# Patient Record
Sex: FEMALE | Race: WHITE | Employment: OTHER | URBAN - METROPOLITAN AREA
[De-identification: names, ages, dates, MRNs, and addresses within clinical notes are randomized per-mention and may not be internally consistent; named-entity substitution may affect disease eponyms.]

---

## 2017-01-13 ENCOUNTER — HOSPITAL ENCOUNTER (OUTPATIENT)
Dept: CT IMAGING | Age: 62
Discharge: HOME | End: 2017-01-13
Payer: MEDICAID

## 2017-01-13 VITALS
HEART RATE: 67 BPM | WEIGHT: 138 LBS | RESPIRATION RATE: 16 BRPM | DIASTOLIC BLOOD PRESSURE: 76 MMHG | HEIGHT: 64 IN | BODY MASS INDEX: 23.56 KG/M2 | SYSTOLIC BLOOD PRESSURE: 113 MMHG

## 2017-01-13 DIAGNOSIS — C50.919 MALIGNANT NEOPLASM OF FEMALE BREAST, UNSPECIFIED LATERALITY, UNSPECIFIED SITE OF BREAST: ICD-10-CM

## 2017-01-13 LAB
GFR AFRICAN AMERICAN: >60
GFR NON-AFRICAN AMERICAN: >60
PERFORMED ON: NORMAL
POC CREATININE: 0.7 MG/DL (ref 0.6–1.2)
POC SAMPLE TYPE: NORMAL

## 2017-01-13 PROCEDURE — 2500000003 HC RX 250 WO HCPCS: Performed by: RADIOLOGY

## 2017-01-13 PROCEDURE — 74177 CT ABD & PELVIS W/CONTRAST: CPT

## 2017-01-13 PROCEDURE — 71260 CT THORAX DX C+: CPT

## 2017-01-13 PROCEDURE — 6360000004 HC RX CONTRAST MEDICATION: Performed by: RADIOLOGY

## 2017-01-13 PROCEDURE — 2580000003 HC RX 258: Performed by: RADIOLOGY

## 2017-01-13 RX ORDER — SODIUM CHLORIDE 0.9 % (FLUSH) 0.9 %
10 SYRINGE (ML) INJECTION
Status: COMPLETED | OUTPATIENT
Start: 2017-01-13 | End: 2017-01-13

## 2017-01-13 RX ADMIN — SODIUM CHLORIDE, PRESERVATIVE FREE 10 ML: 5 INJECTION INTRAVENOUS at 10:44

## 2017-01-13 RX ADMIN — BARIUM SULFATE 450 ML: 21 SUSPENSION ORAL at 10:43

## 2017-01-13 RX ADMIN — IOPAMIDOL 100 ML: 612 INJECTION, SOLUTION INTRAVENOUS at 10:43

## 2017-01-31 RX ORDER — LEVOTHYROXINE SODIUM 0.05 MG/1
TABLET ORAL
Qty: 30 TABLET | Refills: 3 | Status: SHIPPED | OUTPATIENT
Start: 2017-01-31 | End: 2017-08-05 | Stop reason: SDUPTHER

## 2017-02-08 ENCOUNTER — OFFICE VISIT (OUTPATIENT)
Dept: SURGERY | Age: 62
End: 2017-02-08

## 2017-02-08 VITALS
TEMPERATURE: 98.2 F | SYSTOLIC BLOOD PRESSURE: 136 MMHG | BODY MASS INDEX: 24.07 KG/M2 | DIASTOLIC BLOOD PRESSURE: 80 MMHG | WEIGHT: 141 LBS | HEIGHT: 64 IN

## 2017-02-08 DIAGNOSIS — Z95.828 PORT-A-CATH IN PLACE: Primary | ICD-10-CM

## 2017-02-08 PROCEDURE — 99213 OFFICE O/P EST LOW 20 MIN: CPT | Performed by: SURGERY

## 2017-02-17 ENCOUNTER — HOSPITAL ENCOUNTER (OUTPATIENT)
Dept: PREADMISSION TESTING | Age: 62
Discharge: HOME | End: 2017-02-17
Payer: MEDICAID

## 2017-02-17 VITALS
DIASTOLIC BLOOD PRESSURE: 76 MMHG | OXYGEN SATURATION: 97 % | BODY MASS INDEX: 25.34 KG/M2 | SYSTOLIC BLOOD PRESSURE: 155 MMHG | HEIGHT: 63 IN | WEIGHT: 143 LBS | HEART RATE: 64 BPM | TEMPERATURE: 97.8 F

## 2017-02-17 LAB
ANION GAP SERPL CALCULATED.3IONS-SCNC: 14 MEQ/L (ref 7–13)
BUN BLDV-MCNC: 16 MG/DL (ref 8–23)
CALCIUM SERPL-MCNC: 9.3 MG/DL (ref 8.6–10.2)
CHLORIDE BLD-SCNC: 101 MEQ/L (ref 98–107)
CO2: 24 MEQ/L (ref 22–29)
CREAT SERPL-MCNC: 0.6 MG/DL (ref 0.5–0.9)
EKG ATRIAL RATE: 54 BPM
EKG P AXIS: 48 DEGREES
EKG P-R INTERVAL: 150 MS
EKG Q-T INTERVAL: 454 MS
EKG QRS DURATION: 84 MS
EKG QTC CALCULATION (BAZETT): 430 MS
EKG R AXIS: 29 DEGREES
EKG T AXIS: 31 DEGREES
EKG VENTRICULAR RATE: 54 BPM
GFR AFRICAN AMERICAN: >60
GFR NON-AFRICAN AMERICAN: >60
GLUCOSE BLD-MCNC: 90 MG/DL (ref 74–109)
HCT VFR BLD CALC: 39.3 % (ref 37–47)
HEMOGLOBIN: 13.2 G/DL (ref 12–16)
MCH RBC QN AUTO: 30.3 PG (ref 27–31.3)
MCHC RBC AUTO-ENTMCNC: 33.5 % (ref 33–37)
MCV RBC AUTO: 90.5 FL (ref 82–100)
PDW BLD-RTO: 13 % (ref 11.5–14.5)
PLATELET # BLD: 181 K/UL (ref 130–400)
POTASSIUM SERPL-SCNC: 3.8 MEQ/L (ref 3.5–5.1)
RBC # BLD: 4.34 M/UL (ref 4.2–5.4)
SODIUM BLD-SCNC: 139 MEQ/L (ref 132–144)
WBC # BLD: 3.9 K/UL (ref 4.8–10.8)

## 2017-02-17 PROCEDURE — 80048 BASIC METABOLIC PNL TOTAL CA: CPT

## 2017-02-17 PROCEDURE — 85027 COMPLETE CBC AUTOMATED: CPT

## 2017-02-17 PROCEDURE — 93005 ELECTROCARDIOGRAM TRACING: CPT

## 2017-02-17 RX ORDER — SODIUM CHLORIDE, SODIUM LACTATE, POTASSIUM CHLORIDE, CALCIUM CHLORIDE 600; 310; 30; 20 MG/100ML; MG/100ML; MG/100ML; MG/100ML
INJECTION, SOLUTION INTRAVENOUS CONTINUOUS
Status: CANCELLED | OUTPATIENT
Start: 2017-02-17

## 2017-02-17 RX ORDER — SODIUM CHLORIDE 0.9 % (FLUSH) 0.9 %
10 SYRINGE (ML) INJECTION PRN
Status: CANCELLED | OUTPATIENT
Start: 2017-02-17

## 2017-02-17 RX ORDER — SODIUM CHLORIDE 9 MG/ML
INJECTION, SOLUTION INTRAVENOUS CONTINUOUS
Status: CANCELLED | OUTPATIENT
Start: 2017-02-21

## 2017-02-17 RX ORDER — LIDOCAINE HYDROCHLORIDE 10 MG/ML
1 INJECTION, SOLUTION EPIDURAL; INFILTRATION; INTRACAUDAL; PERINEURAL
Status: CANCELLED | OUTPATIENT
Start: 2017-02-17 | End: 2017-02-17

## 2017-02-17 RX ORDER — SODIUM CHLORIDE 0.9 % (FLUSH) 0.9 %
10 SYRINGE (ML) INJECTION EVERY 12 HOURS SCHEDULED
Status: CANCELLED | OUTPATIENT
Start: 2017-02-17

## 2017-02-21 ENCOUNTER — HOSPITAL ENCOUNTER (OUTPATIENT)
Age: 62
Setting detail: OUTPATIENT SURGERY
Discharge: HOME | End: 2017-02-21
Attending: SURGERY | Admitting: SURGERY
Payer: MEDICAID

## 2017-02-21 ENCOUNTER — ANESTHESIA (OUTPATIENT)
Dept: OPERATING ROOM | Age: 62
End: 2017-02-21
Payer: MEDICAID

## 2017-02-21 ENCOUNTER — ANESTHESIA EVENT (OUTPATIENT)
Dept: OPERATING ROOM | Age: 62
End: 2017-02-21
Payer: MEDICAID

## 2017-02-21 ENCOUNTER — SURGERY (OUTPATIENT)
Age: 62
End: 2017-02-21

## 2017-02-21 VITALS — DIASTOLIC BLOOD PRESSURE: 56 MMHG | TEMPERATURE: 95.9 F | SYSTOLIC BLOOD PRESSURE: 113 MMHG | OXYGEN SATURATION: 95 %

## 2017-02-21 VITALS
SYSTOLIC BLOOD PRESSURE: 153 MMHG | BODY MASS INDEX: 26.31 KG/M2 | HEART RATE: 71 BPM | WEIGHT: 143 LBS | RESPIRATION RATE: 16 BRPM | TEMPERATURE: 97.7 F | DIASTOLIC BLOOD PRESSURE: 69 MMHG | OXYGEN SATURATION: 99 % | HEIGHT: 62 IN

## 2017-02-21 PROBLEM — Z95.828 PORT-A-CATH IN PLACE: Status: ACTIVE | Noted: 2017-02-21

## 2017-02-21 PROCEDURE — 7100000010 HC PHASE II RECOVERY - FIRST 15 MIN: Performed by: SURGERY

## 2017-02-21 PROCEDURE — 2500000003 HC RX 250 WO HCPCS: Performed by: STUDENT IN AN ORGANIZED HEALTH CARE EDUCATION/TRAINING PROGRAM

## 2017-02-21 PROCEDURE — 3600000012 HC SURGERY LEVEL 2 ADDTL 15MIN: Performed by: SURGERY

## 2017-02-21 PROCEDURE — 3600000002 HC SURGERY LEVEL 2 BASE: Performed by: SURGERY

## 2017-02-21 PROCEDURE — 2500000003 HC RX 250 WO HCPCS: Performed by: NURSE ANESTHETIST, CERTIFIED REGISTERED

## 2017-02-21 PROCEDURE — 6360000002 HC RX W HCPCS: Performed by: NURSE ANESTHETIST, CERTIFIED REGISTERED

## 2017-02-21 PROCEDURE — 2580000003 HC RX 258: Performed by: NURSE ANESTHETIST, CERTIFIED REGISTERED

## 2017-02-21 PROCEDURE — 7100000011 HC PHASE II RECOVERY - ADDTL 15 MIN: Performed by: SURGERY

## 2017-02-21 PROCEDURE — 6370000000 HC RX 637 (ALT 250 FOR IP): Performed by: SURGERY

## 2017-02-21 PROCEDURE — 3700000000 HC ANESTHESIA ATTENDED CARE: Performed by: SURGERY

## 2017-02-21 PROCEDURE — 7100000001 HC PACU RECOVERY - ADDTL 15 MIN: Performed by: SURGERY

## 2017-02-21 PROCEDURE — 2580000003 HC RX 258: Performed by: SURGERY

## 2017-02-21 PROCEDURE — 2500000003 HC RX 250 WO HCPCS: Performed by: SURGERY

## 2017-02-21 PROCEDURE — 7100000000 HC PACU RECOVERY - FIRST 15 MIN: Performed by: SURGERY

## 2017-02-21 PROCEDURE — 3700000001 HC ADD 15 MINUTES (ANESTHESIA): Performed by: SURGERY

## 2017-02-21 RX ORDER — ACETAMINOPHEN 325 MG/1
650 TABLET ORAL EVERY 4 HOURS PRN
Status: DISCONTINUED | OUTPATIENT
Start: 2017-02-21 | End: 2017-02-21 | Stop reason: HOSPADM

## 2017-02-21 RX ORDER — LIDOCAINE HYDROCHLORIDE 20 MG/ML
INJECTION, SOLUTION INFILTRATION; PERINEURAL PRN
Status: DISCONTINUED | OUTPATIENT
Start: 2017-02-21 | End: 2017-02-21 | Stop reason: SDUPTHER

## 2017-02-21 RX ORDER — ONDANSETRON 2 MG/ML
4 INJECTION INTRAMUSCULAR; INTRAVENOUS
Status: DISCONTINUED | OUTPATIENT
Start: 2017-02-21 | End: 2017-02-21 | Stop reason: HOSPADM

## 2017-02-21 RX ORDER — SODIUM CHLORIDE 9 MG/ML
INJECTION, SOLUTION INTRAVENOUS CONTINUOUS PRN
Status: DISCONTINUED | OUTPATIENT
Start: 2017-02-21 | End: 2017-02-21 | Stop reason: SDUPTHER

## 2017-02-21 RX ORDER — DIPHENHYDRAMINE HYDROCHLORIDE 50 MG/ML
12.5 INJECTION INTRAMUSCULAR; INTRAVENOUS
Status: DISCONTINUED | OUTPATIENT
Start: 2017-02-21 | End: 2017-02-21 | Stop reason: HOSPADM

## 2017-02-21 RX ORDER — MORPHINE SULFATE 4 MG/ML
4 INJECTION, SOLUTION INTRAMUSCULAR; INTRAVENOUS
Status: DISCONTINUED | OUTPATIENT
Start: 2017-02-21 | End: 2017-02-21 | Stop reason: HOSPADM

## 2017-02-21 RX ORDER — SODIUM CHLORIDE 0.9 % (FLUSH) 0.9 %
10 SYRINGE (ML) INJECTION EVERY 12 HOURS SCHEDULED
Status: DISCONTINUED | OUTPATIENT
Start: 2017-02-21 | End: 2017-02-21 | Stop reason: HOSPADM

## 2017-02-21 RX ORDER — ONDANSETRON 2 MG/ML
INJECTION INTRAMUSCULAR; INTRAVENOUS PRN
Status: DISCONTINUED | OUTPATIENT
Start: 2017-02-21 | End: 2017-02-21 | Stop reason: SDUPTHER

## 2017-02-21 RX ORDER — LIDOCAINE HYDROCHLORIDE 10 MG/ML
1 INJECTION, SOLUTION EPIDURAL; INFILTRATION; INTRACAUDAL; PERINEURAL
Status: COMPLETED | OUTPATIENT
Start: 2017-02-21 | End: 2017-02-21

## 2017-02-21 RX ORDER — SODIUM CHLORIDE 9 MG/ML
INJECTION, SOLUTION INTRAVENOUS CONTINUOUS
Status: DISCONTINUED | OUTPATIENT
Start: 2017-02-21 | End: 2017-02-21 | Stop reason: HOSPADM

## 2017-02-21 RX ORDER — MAGNESIUM HYDROXIDE 1200 MG/15ML
LIQUID ORAL CONTINUOUS PRN
Status: DISCONTINUED | OUTPATIENT
Start: 2017-02-21 | End: 2017-02-21 | Stop reason: HOSPADM

## 2017-02-21 RX ORDER — SODIUM CHLORIDE 0.9 % (FLUSH) 0.9 %
10 SYRINGE (ML) INJECTION PRN
Status: DISCONTINUED | OUTPATIENT
Start: 2017-02-21 | End: 2017-02-21 | Stop reason: HOSPADM

## 2017-02-21 RX ORDER — FENTANYL CITRATE 50 UG/ML
50 INJECTION, SOLUTION INTRAMUSCULAR; INTRAVENOUS EVERY 10 MIN PRN
Status: DISCONTINUED | OUTPATIENT
Start: 2017-02-21 | End: 2017-02-21 | Stop reason: HOSPADM

## 2017-02-21 RX ORDER — FENTANYL CITRATE 50 UG/ML
INJECTION, SOLUTION INTRAMUSCULAR; INTRAVENOUS PRN
Status: DISCONTINUED | OUTPATIENT
Start: 2017-02-21 | End: 2017-02-21 | Stop reason: SDUPTHER

## 2017-02-21 RX ORDER — MEPERIDINE HYDROCHLORIDE 25 MG/ML
12.5 INJECTION INTRAMUSCULAR; INTRAVENOUS; SUBCUTANEOUS EVERY 5 MIN PRN
Status: DISCONTINUED | OUTPATIENT
Start: 2017-02-21 | End: 2017-02-21 | Stop reason: HOSPADM

## 2017-02-21 RX ORDER — ACETAMINOPHEN 650 MG/1
650 SUPPOSITORY RECTAL EVERY 4 HOURS PRN
Status: DISCONTINUED | OUTPATIENT
Start: 2017-02-21 | End: 2017-02-21 | Stop reason: HOSPADM

## 2017-02-21 RX ORDER — HYDROCODONE BITARTRATE AND ACETAMINOPHEN 5; 325 MG/1; MG/1
2 TABLET ORAL EVERY 4 HOURS PRN
Status: DISCONTINUED | OUTPATIENT
Start: 2017-02-21 | End: 2017-02-21 | Stop reason: HOSPADM

## 2017-02-21 RX ORDER — MORPHINE SULFATE 2 MG/ML
2 INJECTION, SOLUTION INTRAMUSCULAR; INTRAVENOUS
Status: DISCONTINUED | OUTPATIENT
Start: 2017-02-21 | End: 2017-02-21 | Stop reason: HOSPADM

## 2017-02-21 RX ORDER — MIDAZOLAM HYDROCHLORIDE 1 MG/ML
INJECTION INTRAMUSCULAR; INTRAVENOUS PRN
Status: DISCONTINUED | OUTPATIENT
Start: 2017-02-21 | End: 2017-02-21 | Stop reason: SDUPTHER

## 2017-02-21 RX ORDER — SODIUM CHLORIDE, SODIUM LACTATE, POTASSIUM CHLORIDE, CALCIUM CHLORIDE 600; 310; 30; 20 MG/100ML; MG/100ML; MG/100ML; MG/100ML
INJECTION, SOLUTION INTRAVENOUS CONTINUOUS
Status: DISCONTINUED | OUTPATIENT
Start: 2017-02-21 | End: 2017-02-21

## 2017-02-21 RX ORDER — LIDOCAINE HYDROCHLORIDE AND EPINEPHRINE 10; 10 MG/ML; UG/ML
INJECTION, SOLUTION INFILTRATION; PERINEURAL PRN
Status: DISCONTINUED | OUTPATIENT
Start: 2017-02-21 | End: 2017-02-21 | Stop reason: HOSPADM

## 2017-02-21 RX ORDER — HYDROCODONE BITARTRATE AND ACETAMINOPHEN 5; 325 MG/1; MG/1
1 TABLET ORAL EVERY 4 HOURS PRN
Status: DISCONTINUED | OUTPATIENT
Start: 2017-02-21 | End: 2017-02-21 | Stop reason: HOSPADM

## 2017-02-21 RX ORDER — METOCLOPRAMIDE HYDROCHLORIDE 5 MG/ML
10 INJECTION INTRAMUSCULAR; INTRAVENOUS
Status: DISCONTINUED | OUTPATIENT
Start: 2017-02-21 | End: 2017-02-21 | Stop reason: HOSPADM

## 2017-02-21 RX ORDER — WOUND DRESSING ADHESIVE - LIQUID
LIQUID MISCELLANEOUS PRN
Status: DISCONTINUED | OUTPATIENT
Start: 2017-02-21 | End: 2017-02-21 | Stop reason: HOSPADM

## 2017-02-21 RX ORDER — ONDANSETRON 2 MG/ML
4 INJECTION INTRAMUSCULAR; INTRAVENOUS EVERY 6 HOURS PRN
Status: DISCONTINUED | OUTPATIENT
Start: 2017-02-21 | End: 2017-02-21 | Stop reason: HOSPADM

## 2017-02-21 RX ORDER — PROPOFOL 10 MG/ML
INJECTION, EMULSION INTRAVENOUS PRN
Status: DISCONTINUED | OUTPATIENT
Start: 2017-02-21 | End: 2017-02-21 | Stop reason: SDUPTHER

## 2017-02-21 RX ADMIN — ONDANSETRON 4 MG: 2 INJECTION, SOLUTION INTRAMUSCULAR; INTRAVENOUS at 12:16

## 2017-02-21 RX ADMIN — LIDOCAINE HYDROCHLORIDE 0.1 ML: 10 INJECTION, SOLUTION EPIDURAL; INFILTRATION; INTRACAUDAL; PERINEURAL at 11:22

## 2017-02-21 RX ADMIN — FENTANYL CITRATE 25 MCG: 50 INJECTION, SOLUTION INTRAMUSCULAR; INTRAVENOUS at 12:14

## 2017-02-21 RX ADMIN — PROPOFOL 30 MG: 10 INJECTION, EMULSION INTRAVENOUS at 12:09

## 2017-02-21 RX ADMIN — FENTANYL CITRATE 50 MCG: 50 INJECTION, SOLUTION INTRAMUSCULAR; INTRAVENOUS at 12:00

## 2017-02-21 RX ADMIN — FENTANYL CITRATE 25 MCG: 50 INJECTION, SOLUTION INTRAMUSCULAR; INTRAVENOUS at 12:10

## 2017-02-21 RX ADMIN — LIDOCAINE HYDROCHLORIDE 100 MG: 20 INJECTION, SOLUTION INFILTRATION; PERINEURAL at 12:00

## 2017-02-21 RX ADMIN — HYDROCODONE BITARTRATE AND ACETAMINOPHEN 2 TABLET: 5; 325 TABLET ORAL at 13:29

## 2017-02-21 RX ADMIN — PROPOFOL 20 MG: 10 INJECTION, EMULSION INTRAVENOUS at 12:01

## 2017-02-21 RX ADMIN — SODIUM CHLORIDE 1000 ML: 900 IRRIGANT IRRIGATION at 12:08

## 2017-02-21 RX ADMIN — PROPOFOL 10 MG: 10 INJECTION, EMULSION INTRAVENOUS at 12:06

## 2017-02-21 RX ADMIN — MIDAZOLAM HYDROCHLORIDE 1 MG: 1 INJECTION, SOLUTION INTRAMUSCULAR; INTRAVENOUS at 12:00

## 2017-02-21 RX ADMIN — SODIUM CHLORIDE: 900 INJECTION, SOLUTION INTRAVENOUS at 11:03

## 2017-02-21 RX ADMIN — PROPOFOL 10 MG: 10 INJECTION, EMULSION INTRAVENOUS at 12:12

## 2017-02-21 RX ADMIN — WOUND DRESSING ADHESIVE - LIQUID 1 EACH: LIQUID at 12:17

## 2017-02-21 RX ADMIN — PROPOFOL 10 MG: 10 INJECTION, EMULSION INTRAVENOUS at 12:16

## 2017-02-21 RX ADMIN — SODIUM CHLORIDE: 9 INJECTION, SOLUTION INTRAVENOUS at 11:21

## 2017-02-21 RX ADMIN — MIDAZOLAM HYDROCHLORIDE 1 MG: 1 INJECTION, SOLUTION INTRAMUSCULAR; INTRAVENOUS at 11:54

## 2017-02-21 RX ADMIN — LIDOCAINE HYDROCHLORIDE AND EPINEPHRINE 6 ML: 10; 10 INJECTION, SOLUTION INFILTRATION; PERINEURAL at 12:09

## 2017-02-21 RX ADMIN — PROPOFOL 20 MG: 10 INJECTION, EMULSION INTRAVENOUS at 12:04

## 2017-02-21 ASSESSMENT — PAIN SCALES - GENERAL
PAINLEVEL_OUTOF10: 9
PAINLEVEL_OUTOF10: 9
PAINLEVEL_OUTOF10: 8

## 2017-02-21 ASSESSMENT — PAIN DESCRIPTION - PAIN TYPE
TYPE: SURGICAL PAIN
TYPE: SURGICAL PAIN

## 2017-02-21 ASSESSMENT — ENCOUNTER SYMPTOMS: STRIDOR: 0

## 2017-02-21 ASSESSMENT — PAIN - FUNCTIONAL ASSESSMENT: PAIN_FUNCTIONAL_ASSESSMENT: 0-10

## 2017-03-14 ENCOUNTER — HOSPITAL ENCOUNTER (EMERGENCY)
Age: 62
Discharge: HOME | End: 2017-03-14
Payer: MEDICAID

## 2017-03-14 ENCOUNTER — APPOINTMENT (OUTPATIENT)
Dept: GENERAL RADIOLOGY | Age: 62
End: 2017-03-14
Payer: MEDICAID

## 2017-03-14 ENCOUNTER — APPOINTMENT (OUTPATIENT)
Dept: CT IMAGING | Age: 62
End: 2017-03-14
Payer: MEDICAID

## 2017-03-14 VITALS
WEIGHT: 142 LBS | SYSTOLIC BLOOD PRESSURE: 177 MMHG | TEMPERATURE: 97.2 F | BODY MASS INDEX: 24.24 KG/M2 | HEIGHT: 64 IN | HEART RATE: 61 BPM | RESPIRATION RATE: 18 BRPM | OXYGEN SATURATION: 96 % | DIASTOLIC BLOOD PRESSURE: 91 MMHG

## 2017-03-14 DIAGNOSIS — F07.81 POST CONCUSSION SYNDROME: ICD-10-CM

## 2017-03-14 DIAGNOSIS — S09.90XA CLOSED HEAD INJURY, INITIAL ENCOUNTER: Primary | ICD-10-CM

## 2017-03-14 DIAGNOSIS — S83.411A SPRAIN OF MEDIAL COLLATERAL LIGAMENT OF RIGHT KNEE, INITIAL ENCOUNTER: ICD-10-CM

## 2017-03-14 PROCEDURE — 73562 X-RAY EXAM OF KNEE 3: CPT

## 2017-03-14 PROCEDURE — 6360000002 HC RX W HCPCS: Performed by: PHYSICIAN ASSISTANT

## 2017-03-14 PROCEDURE — 99283 EMERGENCY DEPT VISIT LOW MDM: CPT

## 2017-03-14 PROCEDURE — 96372 THER/PROPH/DIAG INJ SC/IM: CPT

## 2017-03-14 PROCEDURE — 70450 CT HEAD/BRAIN W/O DYE: CPT

## 2017-03-14 RX ORDER — ONDANSETRON 4 MG/1
4 TABLET, ORALLY DISINTEGRATING ORAL EVERY 8 HOURS PRN
Qty: 20 TABLET | Refills: 0 | Status: SHIPPED | OUTPATIENT
Start: 2017-03-14 | End: 2017-04-21

## 2017-03-14 RX ORDER — HYDROCODONE BITARTRATE AND ACETAMINOPHEN 5; 325 MG/1; MG/1
1-2 TABLET ORAL EVERY 6 HOURS PRN
Qty: 20 TABLET | Refills: 0 | Status: SHIPPED | OUTPATIENT
Start: 2017-03-14 | End: 2017-03-21

## 2017-03-14 RX ORDER — MORPHINE SULFATE 4 MG/ML
4 INJECTION, SOLUTION INTRAMUSCULAR; INTRAVENOUS ONCE
Status: COMPLETED | OUTPATIENT
Start: 2017-03-14 | End: 2017-03-14

## 2017-03-14 RX ORDER — NAPROXEN 500 MG/1
500 TABLET ORAL 2 TIMES DAILY
Qty: 20 TABLET | Refills: 0 | Status: SHIPPED | OUTPATIENT
Start: 2017-03-14 | End: 2017-04-21

## 2017-03-14 RX ORDER — ONDANSETRON 4 MG/1
4 TABLET, ORALLY DISINTEGRATING ORAL ONCE
Status: COMPLETED | OUTPATIENT
Start: 2017-03-14 | End: 2017-03-14

## 2017-03-14 RX ADMIN — MORPHINE SULFATE 4 MG: 4 INJECTION, SOLUTION INTRAMUSCULAR; INTRAVENOUS at 21:21

## 2017-03-14 RX ADMIN — ONDANSETRON 4 MG: 4 TABLET, ORALLY DISINTEGRATING ORAL at 21:21

## 2017-03-14 ASSESSMENT — PAIN DESCRIPTION - PAIN TYPE
TYPE: ACUTE PAIN
TYPE: ACUTE PAIN

## 2017-03-14 ASSESSMENT — PAIN SCALES - GENERAL
PAINLEVEL_OUTOF10: 10
PAINLEVEL_OUTOF10: 10
PAINLEVEL_OUTOF10: 2

## 2017-03-14 ASSESSMENT — PAIN DESCRIPTION - DESCRIPTORS: DESCRIPTORS: ACHING

## 2017-03-14 ASSESSMENT — ENCOUNTER SYMPTOMS
APNEA: 0
ABDOMINAL PAIN: 0
ALLERGIC/IMMUNOLOGIC NEGATIVE: 1
COLOR CHANGE: 0
TROUBLE SWALLOWING: 0
EYE PAIN: 0
SHORTNESS OF BREATH: 0

## 2017-03-14 ASSESSMENT — PAIN DESCRIPTION - LOCATION
LOCATION: HEAD
LOCATION: HEAD

## 2017-04-05 ENCOUNTER — OFFICE VISIT (OUTPATIENT)
Dept: FAMILY MEDICINE CLINIC | Age: 62
End: 2017-04-05

## 2017-04-05 VITALS
RESPIRATION RATE: 18 BRPM | DIASTOLIC BLOOD PRESSURE: 82 MMHG | HEIGHT: 63 IN | WEIGHT: 146 LBS | OXYGEN SATURATION: 98 % | TEMPERATURE: 98.6 F | BODY MASS INDEX: 25.87 KG/M2 | HEART RATE: 76 BPM | SYSTOLIC BLOOD PRESSURE: 138 MMHG

## 2017-04-05 DIAGNOSIS — J40 BRONCHITIS: Primary | ICD-10-CM

## 2017-04-05 PROCEDURE — 99213 OFFICE O/P EST LOW 20 MIN: CPT | Performed by: FAMILY MEDICINE

## 2017-04-05 RX ORDER — CEFDINIR 300 MG/1
300 CAPSULE ORAL 2 TIMES DAILY
Qty: 20 CAPSULE | Refills: 0 | Status: SHIPPED | OUTPATIENT
Start: 2017-04-05 | End: 2017-04-15

## 2017-04-05 ASSESSMENT — ENCOUNTER SYMPTOMS
GASTROINTESTINAL NEGATIVE: 1
SINUS PRESSURE: 1
RHINORRHEA: 0
COUGH: 1
CHEST TIGHTNESS: 0
EYES NEGATIVE: 1

## 2017-04-17 ENCOUNTER — TELEPHONE (OUTPATIENT)
Dept: FAMILY MEDICINE CLINIC | Age: 62
End: 2017-04-17

## 2017-04-18 RX ORDER — BENZONATATE 100 MG/1
100 CAPSULE ORAL 3 TIMES DAILY PRN
Qty: 30 CAPSULE | Refills: 1 | Status: SHIPPED | OUTPATIENT
Start: 2017-04-18 | End: 2017-04-25

## 2017-04-21 ENCOUNTER — OFFICE VISIT (OUTPATIENT)
Dept: SURGERY | Age: 62
End: 2017-04-21

## 2017-04-21 VITALS
BODY MASS INDEX: 25.27 KG/M2 | SYSTOLIC BLOOD PRESSURE: 136 MMHG | TEMPERATURE: 98.2 F | WEIGHT: 148 LBS | DIASTOLIC BLOOD PRESSURE: 62 MMHG | HEIGHT: 64 IN

## 2017-04-21 DIAGNOSIS — Z09 SURGERY FOLLOW-UP: ICD-10-CM

## 2017-04-21 DIAGNOSIS — L02.213 ABSCESS OF CHEST WALL: Primary | ICD-10-CM

## 2017-04-21 PROBLEM — Z95.828 PORT-A-CATH IN PLACE: Status: RESOLVED | Noted: 2017-02-21 | Resolved: 2017-04-21

## 2017-04-21 PROCEDURE — 99212 OFFICE O/P EST SF 10 MIN: CPT | Performed by: SURGERY

## 2017-04-21 RX ORDER — CEFDINIR 300 MG/1
CAPSULE ORAL
COMMUNITY
Start: 2017-04-05 | End: 2018-01-11

## 2017-04-21 RX ORDER — DOXYCYCLINE HYCLATE 100 MG
100 TABLET ORAL 2 TIMES DAILY
Qty: 14 TABLET | Refills: 0 | Status: SHIPPED | OUTPATIENT
Start: 2017-04-21 | End: 2017-04-28

## 2017-05-02 ENCOUNTER — TELEPHONE (OUTPATIENT)
Dept: FAMILY MEDICINE CLINIC | Age: 62
End: 2017-05-02

## 2017-05-02 DIAGNOSIS — F32.A DEPRESSION, UNSPECIFIED DEPRESSION TYPE: Primary | ICD-10-CM

## 2017-05-02 RX ORDER — VENLAFAXINE HYDROCHLORIDE 75 MG/1
75 CAPSULE, EXTENDED RELEASE ORAL DAILY
Qty: 30 CAPSULE | Refills: 3 | Status: ON HOLD | OUTPATIENT
Start: 2017-05-02 | End: 2017-09-29

## 2017-05-11 RX ORDER — PRAVASTATIN SODIUM 20 MG
TABLET ORAL
Qty: 90 TABLET | Refills: 1 | Status: SHIPPED | OUTPATIENT
Start: 2017-05-11 | End: 2017-12-29 | Stop reason: SDUPTHER

## 2017-07-12 ENCOUNTER — TELEPHONE (OUTPATIENT)
Dept: FAMILY MEDICINE CLINIC | Age: 62
End: 2017-07-12

## 2017-07-12 DIAGNOSIS — R53.83 FATIGUE, UNSPECIFIED TYPE: Primary | ICD-10-CM

## 2017-07-25 DIAGNOSIS — R53.83 FATIGUE, UNSPECIFIED TYPE: ICD-10-CM

## 2017-07-25 LAB — TSH SERPL DL<=0.05 MIU/L-ACNC: 3.43 UIU/ML (ref 0.27–4.2)

## 2017-07-29 RX ORDER — SERTRALINE HYDROCHLORIDE 100 MG/1
100 TABLET, FILM COATED ORAL DAILY
Qty: 90 TABLET | Refills: 0 | Status: SHIPPED | OUTPATIENT
Start: 2017-07-29 | End: 2018-02-25 | Stop reason: SDUPTHER

## 2017-08-07 RX ORDER — LEVOTHYROXINE SODIUM 0.05 MG/1
TABLET ORAL
Qty: 30 TABLET | Refills: 3 | Status: SHIPPED | OUTPATIENT
Start: 2017-08-07 | End: 2017-12-29 | Stop reason: SDUPTHER

## 2017-08-28 ENCOUNTER — TELEPHONE (OUTPATIENT)
Dept: FAMILY MEDICINE CLINIC | Age: 62
End: 2017-08-28

## 2017-08-28 DIAGNOSIS — Z12.39 BREAST CANCER SCREENING, HIGH RISK PATIENT: Primary | ICD-10-CM

## 2017-08-29 RX ORDER — ALBUTEROL SULFATE 90 UG/1
2 AEROSOL, METERED RESPIRATORY (INHALATION) EVERY 6 HOURS PRN
Qty: 1 INHALER | Refills: 0 | Status: SHIPPED | OUTPATIENT
Start: 2017-08-29 | End: 2018-01-11

## 2017-09-28 ENCOUNTER — HOSPITAL ENCOUNTER (OUTPATIENT)
Age: 62
Setting detail: OBSERVATION
Discharge: HOME | End: 2017-09-30
Attending: INTERNAL MEDICINE | Admitting: INTERNAL MEDICINE
Payer: MEDICAID

## 2017-09-28 DIAGNOSIS — R07.9 CHEST PAIN, UNSPECIFIED TYPE: ICD-10-CM

## 2017-09-28 DIAGNOSIS — R10.10 PAIN OF UPPER ABDOMEN: ICD-10-CM

## 2017-09-28 DIAGNOSIS — M54.41 CHRONIC BILATERAL LOW BACK PAIN WITH BILATERAL SCIATICA: Primary | ICD-10-CM

## 2017-09-28 DIAGNOSIS — G89.29 CHRONIC BILATERAL LOW BACK PAIN WITH BILATERAL SCIATICA: Primary | ICD-10-CM

## 2017-09-28 DIAGNOSIS — M54.42 CHRONIC BILATERAL LOW BACK PAIN WITH BILATERAL SCIATICA: Primary | ICD-10-CM

## 2017-09-28 LAB
ALBUMIN SERPL-MCNC: 4.4 G/DL (ref 3.9–4.9)
ALP BLD-CCNC: 86 U/L (ref 40–130)
ALT SERPL-CCNC: 13 U/L (ref 0–33)
ANION GAP SERPL CALCULATED.3IONS-SCNC: 14 MEQ/L (ref 7–13)
AST SERPL-CCNC: 18 U/L (ref 0–35)
BASOPHILS ABSOLUTE: 0 K/UL (ref 0–0.2)
BASOPHILS RELATIVE PERCENT: 0.7 %
BILIRUB SERPL-MCNC: 0.1 MG/DL (ref 0–1.2)
BILIRUBIN URINE: NEGATIVE
BLOOD, URINE: NEGATIVE
BUN BLDV-MCNC: 22 MG/DL (ref 8–23)
CALCIUM SERPL-MCNC: 9.8 MG/DL (ref 8.6–10.2)
CHLORIDE BLD-SCNC: 101 MEQ/L (ref 98–107)
CLARITY: CLEAR
CO2: 26 MEQ/L (ref 22–29)
COLOR: YELLOW
CREAT SERPL-MCNC: 0.65 MG/DL (ref 0.5–0.9)
D DIMER: <0.19 MG/L FEU (ref 0–0.5)
EOSINOPHILS ABSOLUTE: 0.1 K/UL (ref 0–0.7)
EOSINOPHILS RELATIVE PERCENT: 1.9 %
EPITHELIAL CELLS, UA: NORMAL /HPF
GFR AFRICAN AMERICAN: >60
GFR NON-AFRICAN AMERICAN: >60
GLOBULIN: 2.7 G/DL (ref 2.3–3.5)
GLUCOSE BLD-MCNC: 96 MG/DL (ref 74–109)
GLUCOSE URINE: NEGATIVE MG/DL
HCT VFR BLD CALC: 44.8 % (ref 37–47)
HEMOGLOBIN: 14.6 G/DL (ref 12–16)
KETONES, URINE: NEGATIVE MG/DL
LACTIC ACID: 1.3 MMOL/L (ref 0.5–2.2)
LEUKOCYTE ESTERASE, URINE: ABNORMAL
LIPASE: 53 U/L (ref 13–60)
LYMPHOCYTES ABSOLUTE: 1.8 K/UL (ref 1–4.8)
LYMPHOCYTES RELATIVE PERCENT: 31.4 %
MCH RBC QN AUTO: 29.9 PG (ref 27–31.3)
MCHC RBC AUTO-ENTMCNC: 32.6 % (ref 33–37)
MCV RBC AUTO: 91.9 FL (ref 82–100)
MONOCYTES ABSOLUTE: 0.9 K/UL (ref 0.2–0.8)
MONOCYTES RELATIVE PERCENT: 15.8 %
NEUTROPHILS ABSOLUTE: 2.8 K/UL (ref 1.4–6.5)
NEUTROPHILS RELATIVE PERCENT: 50.2 %
NITRITE, URINE: NEGATIVE
PDW BLD-RTO: 12.7 % (ref 11.5–14.5)
PH UA: 7.5 (ref 5–9)
PLATELET # BLD: 201 K/UL (ref 130–400)
POTASSIUM SERPL-SCNC: 4.4 MEQ/L (ref 3.5–5.1)
PROTEIN UA: NEGATIVE MG/DL
RBC # BLD: 4.88 M/UL (ref 4.2–5.4)
RBC UA: NORMAL /HPF (ref 0–2)
SODIUM BLD-SCNC: 141 MEQ/L (ref 132–144)
SPECIFIC GRAVITY UA: 1.01 (ref 1–1.03)
TOTAL CK: 60 U/L (ref 0–170)
TOTAL PROTEIN: 7.1 G/DL (ref 6.4–8.1)
TROPONIN: <0.01 NG/ML (ref 0–0.01)
URINE REFLEX TO CULTURE: YES
UROBILINOGEN, URINE: 0.2 E.U./DL
WBC # BLD: 5.6 K/UL (ref 4.8–10.8)
WBC UA: NORMAL /HPF (ref 0–5)

## 2017-09-28 PROCEDURE — 85379 FIBRIN DEGRADATION QUANT: CPT

## 2017-09-28 PROCEDURE — 99285 EMERGENCY DEPT VISIT HI MDM: CPT

## 2017-09-28 PROCEDURE — 93005 ELECTROCARDIOGRAM TRACING: CPT

## 2017-09-28 PROCEDURE — 83605 ASSAY OF LACTIC ACID: CPT

## 2017-09-28 PROCEDURE — 81001 URINALYSIS AUTO W/SCOPE: CPT

## 2017-09-28 PROCEDURE — 83690 ASSAY OF LIPASE: CPT

## 2017-09-28 PROCEDURE — 96374 THER/PROPH/DIAG INJ IV PUSH: CPT

## 2017-09-28 PROCEDURE — 85025 COMPLETE CBC W/AUTO DIFF WBC: CPT

## 2017-09-28 PROCEDURE — 82550 ASSAY OF CK (CPK): CPT

## 2017-09-28 PROCEDURE — 80053 COMPREHEN METABOLIC PANEL: CPT

## 2017-09-28 PROCEDURE — 36415 COLL VENOUS BLD VENIPUNCTURE: CPT

## 2017-09-28 PROCEDURE — 6370000000 HC RX 637 (ALT 250 FOR IP): Performed by: PHYSICIAN ASSISTANT

## 2017-09-28 PROCEDURE — 96375 TX/PRO/DX INJ NEW DRUG ADDON: CPT

## 2017-09-28 PROCEDURE — 6360000002 HC RX W HCPCS: Performed by: PHYSICIAN ASSISTANT

## 2017-09-28 PROCEDURE — 87086 URINE CULTURE/COLONY COUNT: CPT

## 2017-09-28 PROCEDURE — 84484 ASSAY OF TROPONIN QUANT: CPT

## 2017-09-28 RX ORDER — LORAZEPAM 2 MG/ML
1 INJECTION INTRAMUSCULAR ONCE
Status: COMPLETED | OUTPATIENT
Start: 2017-09-28 | End: 2017-09-28

## 2017-09-28 RX ORDER — ASPIRIN 325 MG
325 TABLET ORAL ONCE
Status: COMPLETED | OUTPATIENT
Start: 2017-09-28 | End: 2017-09-28

## 2017-09-28 RX ORDER — ONDANSETRON 2 MG/ML
4 INJECTION INTRAMUSCULAR; INTRAVENOUS ONCE
Status: COMPLETED | OUTPATIENT
Start: 2017-09-28 | End: 2017-09-28

## 2017-09-28 RX ORDER — MORPHINE SULFATE 2 MG/ML
2 INJECTION, SOLUTION INTRAMUSCULAR; INTRAVENOUS
Status: DISCONTINUED | OUTPATIENT
Start: 2017-09-28 | End: 2017-09-29

## 2017-09-28 RX ORDER — NITROGLYCERIN 0.4 MG/1
0.4 TABLET SUBLINGUAL ONCE
Status: COMPLETED | OUTPATIENT
Start: 2017-09-28 | End: 2017-09-29

## 2017-09-28 RX ADMIN — ONDANSETRON 4 MG: 2 INJECTION INTRAMUSCULAR; INTRAVENOUS at 23:53

## 2017-09-28 RX ADMIN — ASPIRIN 325 MG: 325 TABLET, COATED ORAL at 23:36

## 2017-09-28 RX ADMIN — LORAZEPAM 1 MG: 2 INJECTION INTRAMUSCULAR; INTRAVENOUS at 23:50

## 2017-09-28 ASSESSMENT — PAIN DESCRIPTION - LOCATION
LOCATION: ABDOMEN
LOCATION: CHEST

## 2017-09-28 ASSESSMENT — PAIN DESCRIPTION - ORIENTATION: ORIENTATION: UPPER;MID

## 2017-09-28 ASSESSMENT — PAIN DESCRIPTION - FREQUENCY: FREQUENCY: CONTINUOUS

## 2017-09-28 ASSESSMENT — PAIN SCALES - GENERAL
PAINLEVEL_OUTOF10: 9
PAINLEVEL_OUTOF10: 10

## 2017-09-28 ASSESSMENT — PAIN DESCRIPTION - PAIN TYPE: TYPE: ACUTE PAIN

## 2017-09-28 NOTE — Clinical Note
Patient Class: Observation [104]   REQUIRED: Diagnosis: Chest pain [[id number]]   Estimated Length of Stay: Estimated stay of less than 2 midnights   Future Attending Provider: Stefani Nixon [[id number]]

## 2017-09-28 NOTE — IP AVS SNAPSHOT
Pneumococcal Vaccine - Pneumovax for adults aged 19-64 years with: chronic heart disease, chronic lung disease, diabetes mellitus, alcoholism, chronic liver disease, or cigarette smoking. (1 of 1 - PPSV23) 8/16/1974    Zoster Vaccine 8/16/2015    Mammograms are recommended every 2 years for low/average risk patients aged 48 - 69, and every year for high risk patients per updated national guidelines. However these guidelines can be individualized by your provider. 10/7/2017    Colonoscopy 9/12/2019    Pap Smear 9/14/2019    Cholesterol Screening 3/19/2020                 Care Plan Once You Return Home    This section includes instructions you will need to follow once you leave the hospital.  Your care team will discuss these with you, so you and those caring for you know how to best care for your health needs at home. This section may also include educational information about certain health topics that may be of help to you. Discharge Instructions       Follow up with Dr. Chintan Blanco MD in the next 7 days or sooner if needed. Please return to ER or call 911 if you develop any significant signs or symptoms. I may not have addressed all of your medical illnesses or the abnormal blood work or imaging therefore please ask your PCP to obtain [facility] record to follow up on all of the abnormal labs, imaging and findings that I have and have not addressed during your hospitalization. Discharging you from the hospital does not mean that your medical care ends here and now. You may still need additional work up, investigation, monitoring, and treatment to be handled from this point on by outside providers including your PCP, Specialists and other healthcare providers. For medication questions, contact your retail pharmacy and your PCP. Your medical team at [facility] appreciates the opportunity to work with you to get well!     DO Jia  5:18 PM Labs and Other Follow-ups after Discharge     External Referral to Physical Therapy       The patient can be scheduled with any member of the group, including the provider with the first available appointments. Reason For External Referral?:  Location   [facility] out patient               [address] allows you to send messages to your doctor, view your test results, renew your prescriptions, schedule appointments, view visit notes, and more. How Do I Sign Up? 1. In your Internet browser, go to [email or web address]  2. Click on the Sign Up Now link in the Sign In box. You will see the New Member Sign Up page. 3. Enter your [employer] Access Code exactly as it appears below. You will not need to use this code after youve completed the sign-up process. If you do not sign up before the expiration date, you must request a new code. [employer] Access Code: [email or web address]  Expires: 11/29/2017  5:34 PM    4. Enter your Social Security Number ([id number]) and Date of Birth (mm/dd/yyyy) as indicated and click Submit. You will be taken to the next sign-up page. 5. Create a [employer] ID. This will be your [employer] login ID and cannot be changed, so think of one that is secure and easy to remember. 6. Create a [employer] password. You can change your password at any time. 7. Enter your Password Reset Question and Answer. This can be used at a later time if you forget your password. 8. Enter your e-mail address. You will receive e-mail notification when new information is available in [address]. 9. Click Sign Up. You can now view your medical record. Additional Information  If you have questions, please contact the physician practice where you receive care. Remember, [employer] is NOT to be used for urgent needs. For medical emergencies, dial 911. For questions regarding your [employer] account call [phone]. If you have a clinical question, please call your doctor's office. View your information online  ? Review your current list of  medications, immunization, and allergies. ? Review your future test results online . ? Review your discharge instructions provided by your caregivers at discharge    Certain functionality such as prescription refills, scheduling appointments or sending messages to your provider are not activated if your provider does not use [employer] in his/her office    For questions regarding your [employer] account call [phone]. If you have a clinical question, please call your doctor's office. The information on all pages of the After Visit Summary has been reviewed with me, the patient and/or responsible adult, by my health care provider(s). I had the opportunity to ask questions regarding this information. I understand I should dispose of my armband safely at home to protect my health information. A complete copy of the After Visit Summary has been given to me, the patient and/or responsible adult. Patient Signature/Responsible Adult:____________________    Clinician Signature:_____________________    Date:_____________________    Time:_____________________        More Information           acetaminophen and oxycodone  Pronunciation:  a SEET a MIN oh fen and OX i KOE done  Brand:  Endocet, Percocet 10/325, Percocet 2.5/325, Percocet 5/325, Percocet 7.5/325, Primlev, Roxicet, Xartemis XR  What is the most important information I should know about acetaminophen and oxycodone? This medicine can slow or stop your breathing, and may be habit-forming. Use only your prescribed dose, and swallow the pill whole to avoid a potentially fatal dose. Never share acetaminophen and oxycodone with another person. MISUSE OF NARCOTIC MEDICINE CAN CAUSE ADDICTION, OVERDOSE, OR DEATH, especially in a child or other person using the medicine without a prescription.   Oxycodone may cause life-threatening withdrawal symptoms in a  if the mother has taken this medicine during pregnancy. What is acetaminophen and oxycodone? Oxycodone is an opioid pain medication. An opioid is sometimes called a narcotic. Acetaminophen is a less potent pain reliever that increases the effects of oxycodone. Acetaminophen and oxycodone is a combination medicine used to relieve moderate to severe pain. Acetaminophen and oxycodone may also be used for purposes not listed in this medication guide. What should I discuss with my healthcare provider before taking acetaminophen and oxycodone? You should not use this medicine if you are allergic to acetaminophen (Tylenol) or oxycodone, or if:  · you have severe asthma or breathing problems;  · you have a bowel obstruction called paralytic ileus; or  · you have recently used alcohol, sedatives, tranquilizers, or other narcotic medications. Some medicines can interact with oxycodone and cause a serious condition called serotonin syndrome. Be sure your doctor knows if you also take medicine for depression, mental illness, Parkinson's disease, migraine headaches, serious infections, or prevention of nausea and vomiting. Ask your doctor before making any changes in how or when you take your medications. To make sure this medicine is safe for you, tell your doctor if you have:  · any type of breathing problem or lung disease;  · liver disease, cirrhosis, or if you drink more than 3 alcoholic beverages per day;  · a history of drug abuse, alcohol addiction, or mental illness;  · diarrhea, inflammatory bowel disease, or a blockage in your stomach or intestines;  · kidney disease, urination problems;  · problems with your gallbladder, pancreas, or thyroid;  · a history of head injury, brain tumor, or seizures; or  · if you use a sedative like Valium (diazepam, alprazolam, lorazepam, Ativan, Klonopin, Restoril, Tranxene, Versed, Xanax, and others).   This medicine is more likely to cause breathing problems in older adults and people who are severely ill, malnourished, or otherwise debilitated. If you use oxycodone while you are pregnant, your baby could become dependent on the drug. This can cause life-threatening withdrawal symptoms in the baby after it is born. Babies born dependent on habit-forming medicine may need medical treatment for several weeks. Tell your doctor if you are pregnant or plan to become pregnant. This medicine may pass into breast milk and could harm a nursing baby. Tell your doctor if you are breast-feeding a baby. How should I take acetaminophen and oxycodone? Follow all directions on your prescription label. Oxycodone can slow or stop your breathing, especially when you start using this medicine or whenever your dose is changed. Never use this medicine in larger amounts, or for longer than prescribed. An overdose can damage your liver or cause death. Tell your doctor if the medicine seems to stop working as well in relieving your pain. Oxycodone may be habit-forming, even at regular doses. Never share this medicine with another person, especially someone with a history of drug abuse or addiction. MISUSE OF NARCOTIC MEDICINE CAN CAUSE ADDICTION, OVERDOSE, OR DEATH, especially in a child or other person using the medicine without a prescription. Selling or giving away acetaminophen and oxycodone is against the law. Measure liquid medicine with a special dose-measuring spoon or medicine cup. If you do not have a dose-measuring device, ask your pharmacist for one. Do not crush, break, or open an extended-release pill. Swallow it whole to avoid exposure to a potentially fatal dose. If you need surgery, tell the surgeon ahead of time that you are using this medicine. You may need to stop using the medicine for a short time. Do not stop using this medicine suddenly after long-term use, or you could have unpleasant withdrawal symptoms. Ask your doctor how to safely stop using the medicine. can cause you to get too much acetaminophen which can lead to a fatal overdose. What are the possible side effects of acetaminophen and oxycodone? Get emergency medical help if you have signs of an allergic reaction: hives; difficulty breathing; swelling of your face, lips, tongue, or throat. In rare cases, acetaminophen may cause a severe skin reaction that can be fatal. This could occur even if you have taken acetaminophen in the past and had no reaction. Stop taking this medicine and call your doctor right away if you have skin redness or a rash that spreads and causes blistering and peeling. Like other narcotic medicines, oxycodone can slow your breathing. Death may occur if breathing becomes too weak. Call your doctor at once if you have:  · shallow breathing, slow heartbeat;  · a light-headed feeling, like you might pass out;  · confusion, unusual thoughts or behavior;  · seizure (convulsions);  · problems with urination;  · infertility, missed menstrual periods;  · impotence, sexual problems, loss of interest in sex;  · liver problems --nausea, upper stomach pain, itching, loss of appetite, dark urine, katherin-colored stools, jaundice (yellowing of the skin or eyes); or  · low cortisol levels -- nausea, vomiting, loss of appetite, dizziness, worsening tiredness or weakness. Seek medical attention right away if you have symptoms of serotonin syndrome, such as: agitation, hallucinations, fever, sweating, shivering, fast heart rate, muscle stiffness, twitching, loss of coordination, nausea, vomiting, or diarrhea. Common side effects include:  · headache, drowsiness, tiredness;  · nausea, vomiting, stomach pain, constipation;  · blurred vision; or  · dry mouth. This is not a complete list of side effects and others may occur. Call your doctor for medical advice about side effects. You may report side effects to FDA at 3-674-FDA-8097. What other drugs will affect acetaminophen and oxycodone? Methocarbamol may also be used for purposes not listed in this medication guide. What should I discuss with my health care provider before taking methocarbamol? You should not use this medication if you are allergic to methocarbamol. Before using methocarbamol, tell your doctor if you have myasthenia gravis. FDA pregnancy category C. It is not known whether methocarbamol will harm an unborn baby. Tell your doctor if you are pregnant or plan to become pregnant while using this medication. It is not known whether methocarbamol passes into breast milk or if it could harm a nursing baby. Do not use this medication without telling your doctor if you are breast-feeding a baby. How should I take methocarbamol? Take exactly as prescribed by your doctor. Do not take in larger or smaller amounts or for longer than recommended. Follow the directions on your prescription label. You may need to reduce your methocarbamol dose after the first 2 or 3 days of treatment. Follow your doctor's instructions regarding the number of tablets you take each day. This medication can cause unusual results with certain medical tests. Tell any doctor who treats you that you are using methocarbamol. Methocarbamol is only part of a complete program of treatment that may also include rest, physical therapy, or other pain relief measures. Follow your doctor's instructions. Store at room temperature away from moisture and heat. What happens if I miss a dose? Take the missed dose as soon as you remember. Skip the missed dose if it is almost time for your next scheduled dose. Do not take extra medicine to make up the missed dose. What happens if I overdose? Seek emergency medical attention or call the Poison Help line at 1-652.853.2851. Overdose symptoms may include extreme drowsiness, fainting, or seizure (convulsions). What should I avoid while taking methocarbamol? This medication may impair your thinking or reactions. Be careful if you drive or do anything that requires you to be alert. Drinking alcohol can increase certain side effects of methocarbamol. What are the possible side effects of methocarbamol? Get emergency medical help if you have any of these signs of an allergic reaction:  hives; difficulty breathing; swelling of your face, lips, tongue, or throat. Stop using methocarbamol and call your doctor at once if you have a serious side effect such as:  · fever, chills, flu symptoms;  · slow heart rate;  · feeling like you might pass out;  · seizure (convulsions); or  · jaundice (yellowing of your skin or eyes). Less serious side effects may include:  · dizziness, spinning sensation, drowsiness;  · headache, confusion, memory problems, loss of balance or coordination;  · nausea, vomiting, upset stomach;  · flushing (warmth, redness, or tingly feeling);  · blurred vision, double vision, eye redness;  · sleep problems (insomnia);  · stuffy nose; or  · mild itching or rash. This is not a complete list of side effects and others may occur. Call your doctor for medical advice about side effects. You may report side effects to FDA at 1-267-FDA-7214. What other drugs will affect methocarbamol? Before using methocarbamol, tell your doctor if you regularly use other medicines that make you sleepy (such as cold or allergy medicine, sedatives, narcotic pain medicine, sleeping pills, muscle relaxers, and medicine for seizures, depression, or anxiety). They can add to sleepiness caused by methocarbamol. Tell your doctor about all other medicines you use, especially pyridostigmine (Mestinon). This list is not complete and other drugs may interact with methocarbamol. Tell your doctor about all medications you use. This includes prescription, over-the-counter, vitamin, and herbal products. Do not start a new medication without telling your doctor. Where can I get more information? Your pharmacist can provide more information about methocarbamol. Remember, keep this and all other medicines out of the reach of children, never share your medicines with others, and use this medication only for the indication prescribed. Every effort has been made to ensure that the information provided by [address] is accurate, up-to-date, and complete, but no guarantee is made to that effect. Drug information contained herein may be time sensitive. [facility] information has been compiled for use by healthcare practitioners and consumers in the [place] and therefore [facility] does not warrant that uses outside of the [place] are appropriate, unless specifically indicated otherwise. [facility]'s drug information does not endorse drugs, diagnose patients or recommend therapy. [facility]'s drug information is an informational resource designed to assist licensed healthcare practitioners in caring for their patients and/or to serve consumers viewing this service as a supplement to, and not a substitute for, the expertise, skill, knowledge and judgment of healthcare practitioners. The absence of a warning for a given drug or drug combination in no way should be construed to indicate that the drug or drug combination is safe, effective or appropriate for any given patient. [facility] does not assume any responsibility for any aspect of healthcare administered with the aid of information [facility] provides. The information contained herein is not intended to cover all possible uses, directions, precautions, warnings, drug interactions, allergic reactions, or adverse effects. If you have questions about the drugs you are taking, check with your doctor, nurse or pharmacist.  Copyright 9785-0558 [address]. Version: 5.01. Revision date: 7/28/2011. Care instructions adapted under license by [facility] ([facility]).  If you have jaundice (yellowing of the skin or eyes);  · kidney problems --little or no urinating, painful or difficult urination, swelling in your feet or ankles, feeling tired or short of breath;  · low red blood cells (anemia) --pale skin, feeling light-headed or short of breath, rapid heart rate, trouble concentrating; or  · severe skin reaction --fever, sore throat, swelling in your face or tongue, burning in your eyes, skin pain followed by a red or purple skin rash that spreads (especially in the face or upper body) and causes blistering and peeling. Common side effects may include:  · upset stomach, nausea, vomiting, heartburn;  · diarrhea, constipation, gas;  · dizziness; or  · cold symptoms, flu symptoms. This is not a complete list of side effects and others may occur. Call your doctor for medical advice about side effects. You may report side effects to FDA at 6-830-FDA-1699. What other drugs will affect meloxicam?  Ask your doctor before using meloxicam if you take an antidepressant such as citalopram, escitalopram, fluoxetine (Prozac), fluvoxamine, paroxetine, sertraline (Zoloft), trazodone, or vilazodone. Taking any of these medicines with an NSAID may cause you to bruise or bleed easily. Tell your doctor about all your current medicines and any you start or stop using, especially:  · cyclosporine;  · lithium;  · methotrexate;  · sodium polystyrene sulfonate (Kayexalate);  · a blood thinner (warfarin, Coumadin, Jantoven);  · heart or blood pressure medication, including a diuretic or \"water pill\"; or  · steroid medicine (such as prednisone). This list is not complete. Other drugs may interact with meloxicam, including prescription and over-the-counter medicines, vitamins, and herbal products. Not all possible interactions are listed in this medication guide. Where can I get more information?   Your pharmacist can provide more information about meloxicam. Remember, keep this and all other medicines out of the reach of children, never share your medicines with others, and use this medication only for the indication prescribed. Every effort has been made to ensure that the information provided by [address] is accurate, up-to-date, and complete, but no guarantee is made to that effect. Drug information contained herein may be time sensitive. [facility] information has been compiled for use by healthcare practitioners and consumers in the [place] and therefore [facility] does not warrant that uses outside of the [place] are appropriate, unless specifically indicated otherwise. [facility]'s drug information does not endorse drugs, diagnose patients or recommend therapy. [facility]'s drug information is an informational resource designed to assist licensed healthcare practitioners in caring for their patients and/or to serve consumers viewing this service as a supplement to, and not a substitute for, the expertise, skill, knowledge and judgment of healthcare practitioners. The absence of a warning for a given drug or drug combination in no way should be construed to indicate that the drug or drug combination is safe, effective or appropriate for any given patient. [facility] does not assume any responsibility for any aspect of healthcare administered with the aid of information [facility] provides. The information contained herein is not intended to cover all possible uses, directions, precautions, warnings, drug interactions, allergic reactions, or adverse effects. If you have questions about the drugs you are taking, check with your doctor, nurse or pharmacist.  Copyright 0616-6509 [address] [address] [place] [address]. Version: 12.03. Revision date: 12/4/2015. Care instructions adapted under license by [facility] ([facility]).  If you have questions about a medical condition or this instruction, always ask your healthcare professional. Fabian Nichols disclaims any warranty To make sure lidocaine topical is safe for you, tell your doctor if you have:  · liver disease; or  · if you take a heart rhythm medicine. Lidocaine topical is not expected to harm an unborn baby. Tell your doctor if you are pregnant. It is not known whether lidocaine topical passes into breast milk or if it could harm a nursing baby. Tell your doctor if you are breast-feeding a baby. How should I use lidocaine topical?  Use this medicine exactly as directed on the label, or as it has been prescribed by your doctor. Do not apply this medicine in larger amounts than recommended. Improper use of lidocaine topical may result in death. Lidocaine topical comes in many different forms (gel, spray, cream, lotion, ointment, liquid, skin patch). Do not take by mouth. Lidocaine topical is for use only on the skin. If this medicine gets in your eyes, nose, mouth, rectum, or vagina, rinse with water. Use the smallest amount of medicine needed to numb the skin or relieve pain. Your body may absorb too much of this medicine if you use too much, if you apply it over large skin areas, or if you apply heat, bandages, or plastic wrap to treated skin areas. Skin that is cut or irritated may also absorb more topical medication than healthy skin. Do not apply this medicine to swollen skin areas or deep puncture wounds. Avoid using the medicine on skin that is raw or blistered, such as a severe burn or abrasion. Do not cover treated skin unless your doctor has told you to. Lidocaine topical may be applied with your finger tips or a cotton swab. If your medicine comes with patient instructions for safe and effective use, follow these directions carefully. Ask your doctor or pharmacist if you have any questions. Store at room temperature away from moisture and heat. Keep both used and unused lidocaine topical skin patches out of the reach of children or pets.  The amount of lidocaine in the skin patches could be

## 2017-09-28 NOTE — IP AVS SNAPSHOT
Patient Information     Patient Name DIEGO Lakhani 1955         This is your updated medication list to keep with you all times      TAKE these medications     albuterol sulfate  (90 Base) MCG/ACT inhaler   Commonly known as:  VENTOLIN HFA   Inhale 2 puffs into the lungs every 6 hours as needed for Wheezing       aspirin 81 MG tablet       cefdinir 300 MG capsule   Commonly known as:  OMNICEF       levothyroxine 50 MCG tablet   Commonly known as:  SYNTHROID   take 1 tablet by mouth once daily       lidocaine 5 %   Commonly known as:  LIDODERM   Place 2 patches onto the skin daily 12 hours on, 12 hours off. Notes to Patient:  Remove patches tonight at 8 PM, reapply new patches tomorrow       meloxicam 7.5 MG tablet   Commonly known as:  MOBIC   Take 1 tablet by mouth 2 times daily (with meals)       methocarbamol 500 MG tablet   Commonly known as:  ROBAXIN   Take 1 tablet by mouth 3 times daily as needed (back muscle spasm)       oxyCODONE-acetaminophen 5-325 MG per tablet   Commonly known as:  PERCOCET   Take 1 tablet by mouth every 6 hours as needed for Pain (for back pain) .        pravastatin 20 MG tablet   Commonly known as:  PRAVACHOL   take 1 tablet by mouth every evening       sertraline 100 MG tablet   Commonly known as:  ZOLOFT   Take 1 tablet by mouth daily       verapamil 180 MG extended release tablet   Commonly known as:  CALAN SR   take 1 tablet by mouth once daily

## 2017-09-29 ENCOUNTER — APPOINTMENT (OUTPATIENT)
Dept: CT IMAGING | Age: 62
End: 2017-09-29
Payer: MEDICAID

## 2017-09-29 ENCOUNTER — APPOINTMENT (OUTPATIENT)
Dept: GENERAL RADIOLOGY | Age: 62
End: 2017-09-29
Payer: MEDICAID

## 2017-09-29 LAB
ALBUMIN SERPL-MCNC: 3.8 G/DL (ref 3.9–4.9)
ALP BLD-CCNC: 70 U/L (ref 40–130)
ALT SERPL-CCNC: 11 U/L (ref 0–33)
ANION GAP SERPL CALCULATED.3IONS-SCNC: 16 MEQ/L (ref 7–13)
AST SERPL-CCNC: 15 U/L (ref 0–35)
BASOPHILS ABSOLUTE: 0 K/UL (ref 0–0.2)
BASOPHILS RELATIVE PERCENT: 0.7 %
BILIRUB SERPL-MCNC: 0.2 MG/DL (ref 0–1.2)
BUN BLDV-MCNC: 23 MG/DL (ref 8–23)
CALCIUM SERPL-MCNC: 8.7 MG/DL (ref 8.6–10.2)
CHLORIDE BLD-SCNC: 103 MEQ/L (ref 98–107)
CO2: 23 MEQ/L (ref 22–29)
CREAT SERPL-MCNC: 0.59 MG/DL (ref 0.5–0.9)
EOSINOPHILS ABSOLUTE: 0.1 K/UL (ref 0–0.7)
EOSINOPHILS RELATIVE PERCENT: 1.8 %
GFR AFRICAN AMERICAN: >60
GFR NON-AFRICAN AMERICAN: >60
GLOBULIN: 1.9 G/DL (ref 2.3–3.5)
GLUCOSE BLD-MCNC: 94 MG/DL (ref 74–109)
HCT VFR BLD CALC: 38.1 % (ref 37–47)
HEMOGLOBIN: 12.5 G/DL (ref 12–16)
LV EF: 60 %
LVEF MODALITY: NORMAL
LYMPHOCYTES ABSOLUTE: 1.3 K/UL (ref 1–4.8)
LYMPHOCYTES RELATIVE PERCENT: 27 %
MCH RBC QN AUTO: 30.3 PG (ref 27–31.3)
MCHC RBC AUTO-ENTMCNC: 32.8 % (ref 33–37)
MCV RBC AUTO: 92.3 FL (ref 82–100)
MONOCYTES ABSOLUTE: 0.7 K/UL (ref 0.2–0.8)
MONOCYTES RELATIVE PERCENT: 14.5 %
NEUTROPHILS ABSOLUTE: 2.7 K/UL (ref 1.4–6.5)
NEUTROPHILS RELATIVE PERCENT: 56 %
PDW BLD-RTO: 12.9 % (ref 11.5–14.5)
PLATELET # BLD: 163 K/UL (ref 130–400)
POTASSIUM SERPL-SCNC: 4 MEQ/L (ref 3.5–5.1)
RBC # BLD: 4.13 M/UL (ref 4.2–5.4)
SODIUM BLD-SCNC: 142 MEQ/L (ref 132–144)
TOTAL PROTEIN: 5.7 G/DL (ref 6.4–8.1)
TROPONIN: <0.01 NG/ML (ref 0–0.01)
TROPONIN: <0.01 NG/ML (ref 0–0.01)
WBC # BLD: 4.8 K/UL (ref 4.8–10.8)

## 2017-09-29 PROCEDURE — 6370000000 HC RX 637 (ALT 250 FOR IP): Performed by: PHYSICIAN ASSISTANT

## 2017-09-29 PROCEDURE — 6360000002 HC RX W HCPCS: Performed by: INTERNAL MEDICINE

## 2017-09-29 PROCEDURE — 6370000000 HC RX 637 (ALT 250 FOR IP): Performed by: INTERNAL MEDICINE

## 2017-09-29 PROCEDURE — 96375 TX/PRO/DX INJ NEW DRUG ADDON: CPT

## 2017-09-29 PROCEDURE — C9113 INJ PANTOPRAZOLE SODIUM, VIA: HCPCS | Performed by: INTERNAL MEDICINE

## 2017-09-29 PROCEDURE — 6360000002 HC RX W HCPCS: Performed by: PHYSICIAN ASSISTANT

## 2017-09-29 PROCEDURE — 93005 ELECTROCARDIOGRAM TRACING: CPT

## 2017-09-29 PROCEDURE — 2500000003 HC RX 250 WO HCPCS

## 2017-09-29 PROCEDURE — 74177 CT ABD & PELVIS W/CONTRAST: CPT

## 2017-09-29 PROCEDURE — 85025 COMPLETE CBC W/AUTO DIFF WBC: CPT

## 2017-09-29 PROCEDURE — 2580000003 HC RX 258: Performed by: INTERNAL MEDICINE

## 2017-09-29 PROCEDURE — 6360000004 HC RX CONTRAST MEDICATION: Performed by: RADIOLOGY

## 2017-09-29 PROCEDURE — 71010 XR CHEST PORTABLE: CPT

## 2017-09-29 PROCEDURE — 2700000000 HC OXYGEN THERAPY PER DAY

## 2017-09-29 PROCEDURE — 96372 THER/PROPH/DIAG INJ SC/IM: CPT

## 2017-09-29 PROCEDURE — G0378 HOSPITAL OBSERVATION PER HR: HCPCS

## 2017-09-29 PROCEDURE — 96376 TX/PRO/DX INJ SAME DRUG ADON: CPT

## 2017-09-29 PROCEDURE — 84484 ASSAY OF TROPONIN QUANT: CPT

## 2017-09-29 PROCEDURE — 36415 COLL VENOUS BLD VENIPUNCTURE: CPT

## 2017-09-29 PROCEDURE — 80053 COMPREHEN METABOLIC PANEL: CPT

## 2017-09-29 PROCEDURE — 93306 TTE W/DOPPLER COMPLETE: CPT

## 2017-09-29 RX ORDER — PRAVASTATIN SODIUM 20 MG
20 TABLET ORAL NIGHTLY
Status: DISCONTINUED | OUTPATIENT
Start: 2017-09-29 | End: 2017-09-30 | Stop reason: HOSPADM

## 2017-09-29 RX ORDER — FAMOTIDINE 20 MG/1
20 TABLET, FILM COATED ORAL 2 TIMES DAILY
Status: DISCONTINUED | OUTPATIENT
Start: 2017-09-29 | End: 2017-09-29 | Stop reason: ALTCHOICE

## 2017-09-29 RX ORDER — SERTRALINE HYDROCHLORIDE 100 MG/1
100 TABLET, FILM COATED ORAL DAILY
Status: DISCONTINUED | OUTPATIENT
Start: 2017-09-29 | End: 2017-09-30 | Stop reason: HOSPADM

## 2017-09-29 RX ORDER — DICYCLOMINE HYDROCHLORIDE 10 MG/1
10 CAPSULE ORAL ONCE
Status: COMPLETED | OUTPATIENT
Start: 2017-09-29 | End: 2017-09-29

## 2017-09-29 RX ORDER — SODIUM CHLORIDE 0.9 % (FLUSH) 0.9 %
10 SYRINGE (ML) INJECTION EVERY 12 HOURS SCHEDULED
Status: DISCONTINUED | OUTPATIENT
Start: 2017-09-29 | End: 2017-09-30 | Stop reason: HOSPADM

## 2017-09-29 RX ORDER — PANTOPRAZOLE SODIUM 40 MG/1
40 TABLET, DELAYED RELEASE ORAL
Status: DISCONTINUED | OUTPATIENT
Start: 2017-09-29 | End: 2017-09-29

## 2017-09-29 RX ORDER — PANTOPRAZOLE SODIUM 40 MG/10ML
40 INJECTION, POWDER, LYOPHILIZED, FOR SOLUTION INTRAVENOUS 2 TIMES DAILY
Status: DISCONTINUED | OUTPATIENT
Start: 2017-09-29 | End: 2017-09-30 | Stop reason: HOSPADM

## 2017-09-29 RX ORDER — SODIUM CHLORIDE 0.9 % (FLUSH) 0.9 %
10 SYRINGE (ML) INJECTION PRN
Status: DISCONTINUED | OUTPATIENT
Start: 2017-09-29 | End: 2017-09-30 | Stop reason: HOSPADM

## 2017-09-29 RX ORDER — KETOROLAC TROMETHAMINE 30 MG/ML
30 INJECTION, SOLUTION INTRAMUSCULAR; INTRAVENOUS ONCE
Status: COMPLETED | OUTPATIENT
Start: 2017-09-29 | End: 2017-09-29

## 2017-09-29 RX ORDER — ONDANSETRON 2 MG/ML
4 INJECTION INTRAMUSCULAR; INTRAVENOUS EVERY 6 HOURS PRN
Status: DISCONTINUED | OUTPATIENT
Start: 2017-09-29 | End: 2017-09-30 | Stop reason: HOSPADM

## 2017-09-29 RX ORDER — LEVOTHYROXINE SODIUM 0.05 MG/1
50 TABLET ORAL DAILY
Status: DISCONTINUED | OUTPATIENT
Start: 2017-09-29 | End: 2017-09-30 | Stop reason: HOSPADM

## 2017-09-29 RX ORDER — ASPIRIN 81 MG/1
81 TABLET, CHEWABLE ORAL DAILY
Status: DISCONTINUED | OUTPATIENT
Start: 2017-09-29 | End: 2017-09-30 | Stop reason: HOSPADM

## 2017-09-29 RX ORDER — ACETAMINOPHEN 325 MG/1
650 TABLET ORAL EVERY 4 HOURS PRN
Status: DISCONTINUED | OUTPATIENT
Start: 2017-09-29 | End: 2017-09-30

## 2017-09-29 RX ADMIN — PRAVASTATIN SODIUM 20 MG: 20 TABLET ORAL at 21:58

## 2017-09-29 RX ADMIN — BARIUM SULFATE 9.45 G: 21 SUSPENSION ORAL at 09:58

## 2017-09-29 RX ADMIN — ACETAMINOPHEN 650 MG: 325 TABLET ORAL at 10:13

## 2017-09-29 RX ADMIN — NITROGLYCERIN 0.4 MG: 0.4 TABLET SUBLINGUAL at 00:18

## 2017-09-29 RX ADMIN — Medication 10 ML: at 21:58

## 2017-09-29 RX ADMIN — Medication 10 ML: at 08:50

## 2017-09-29 RX ADMIN — LEVOTHYROXINE SODIUM 50 MCG: 50 TABLET ORAL at 07:00

## 2017-09-29 RX ADMIN — PANTOPRAZOLE SODIUM 40 MG: 40 INJECTION, POWDER, FOR SOLUTION INTRAVENOUS at 09:57

## 2017-09-29 RX ADMIN — ENOXAPARIN SODIUM 40 MG: 40 INJECTION SUBCUTANEOUS at 08:47

## 2017-09-29 RX ADMIN — IOPAMIDOL 100 ML: 755 INJECTION, SOLUTION INTRAVENOUS at 11:54

## 2017-09-29 RX ADMIN — MORPHINE SULFATE 2 MG: 2 INJECTION, SOLUTION INTRAMUSCULAR; INTRAVENOUS at 00:37

## 2017-09-29 RX ADMIN — ASPIRIN 81 MG CHEWABLE TABLET 81 MG: 81 TABLET CHEWABLE at 08:47

## 2017-09-29 RX ADMIN — PANTOPRAZOLE SODIUM 40 MG: 40 INJECTION, POWDER, FOR SOLUTION INTRAVENOUS at 21:58

## 2017-09-29 RX ADMIN — VERAPAMIL HYDROCHLORIDE 180 MG: 180 TABLET, FILM COATED, EXTENDED RELEASE ORAL at 22:15

## 2017-09-29 RX ADMIN — SERTRALINE HYDROCHLORIDE 100 MG: 100 TABLET ORAL at 08:47

## 2017-09-29 RX ADMIN — ACETAMINOPHEN 650 MG: 325 TABLET ORAL at 07:00

## 2017-09-29 RX ADMIN — FAMOTIDINE 20 MG: 20 TABLET ORAL at 08:47

## 2017-09-29 RX ADMIN — DICYCLOMINE HYDROCHLORIDE 10 MG: 10 CAPSULE ORAL at 01:22

## 2017-09-29 RX ADMIN — KETOROLAC TROMETHAMINE 30 MG: 30 INJECTION, SOLUTION INTRAMUSCULAR at 01:31

## 2017-09-29 ASSESSMENT — PAIN SCALES - GENERAL
PAINLEVEL_OUTOF10: 4
PAINLEVEL_OUTOF10: 6
PAINLEVEL_OUTOF10: 8
PAINLEVEL_OUTOF10: 6
PAINLEVEL_OUTOF10: 8
PAINLEVEL_OUTOF10: 8
PAINLEVEL_OUTOF10: 6
PAINLEVEL_OUTOF10: 8

## 2017-09-29 ASSESSMENT — ENCOUNTER SYMPTOMS
VOMITING: 0
ABDOMINAL PAIN: 1
COUGH: 0
NAUSEA: 1
ABDOMINAL DISTENTION: 0
SHORTNESS OF BREATH: 1
EYE DISCHARGE: 0
APNEA: 0
VOICE CHANGE: 0
EYE PAIN: 0
ANAL BLEEDING: 0
PHOTOPHOBIA: 0

## 2017-09-29 ASSESSMENT — PAIN DESCRIPTION - ORIENTATION
ORIENTATION: RIGHT;UPPER
ORIENTATION: RIGHT;UPPER

## 2017-09-29 ASSESSMENT — PAIN DESCRIPTION - FREQUENCY: FREQUENCY: INTERMITTENT

## 2017-09-29 ASSESSMENT — PAIN DESCRIPTION - LOCATION
LOCATION: ABDOMEN

## 2017-09-29 ASSESSMENT — PAIN DESCRIPTION - PAIN TYPE
TYPE: ACUTE PAIN
TYPE: ACUTE PAIN

## 2017-09-29 ASSESSMENT — PAIN DESCRIPTION - PROGRESSION: CLINICAL_PROGRESSION: GRADUALLY IMPROVING

## 2017-09-29 ASSESSMENT — PAIN DESCRIPTION - DESCRIPTORS: DESCRIPTORS: HEAVINESS

## 2017-09-29 ASSESSMENT — PAIN DESCRIPTION - ONSET: ONSET: AWAKENED FROM SLEEP

## 2017-09-29 NOTE — H&P
History and Physical    Patient's Name/Date of Birth: Miriam Canada /  (2 y.o.)    Date: 2017     Chief Complaint: Epigastric pain    HPI:   61years old female with past medical history of hypothyroidism, hyperlipidemia, CAD, hypertension, presented to the ER because of chest pain/epigastric pain. Pain started couple of hours before presentation. Has been constant and about 8 out of 10 in severity. She denied nausea or vomiting or any other associated symptoms. She mentioned that she had a history of \"mild heart attack\". She denied any stent or heart surgery. She mentioned that her friend  yesterday however she said that she is not stressed about it. She has history of breast cancer and she had chemo and radiation to the chest.      Past Medical History:   Diagnosis Date    Anxiety     Arthritis     Asthma     Cancer ([facility] [address] 2015    Right breast cancer metastatic to axillary node    Chronic back pain     Depression     History of blood transfusion     Hyperlipidemia     Hypertension     Prolonged emergence from general anesthesia     SVT (supraventricular tachycardia) ([facility] [address]     Thyroid disease        Past Surgical History:   Procedure Laterality Date    BREAST BIOPSY Right 4/16/15    ULTRASOUND GUIDED BIOPSY RIGHT AXILLA, RIGHT AXILLARY DISSECTION,ULTRASOUND GUIDED RIGHT BREAST BIOPSY    COLONOSCOPY  14    polypectomy jarmotye    [place] RMVL [facility]LLY CTR VAD W/SUBQ PORT/ CTR/PRPH INSJ N/A 2017    PORT REMOVAL performed by Lester Crawford MD at [facility]      tail bone    TONSILLECTOMY      TUBAL LIGATION      TUNNELED VENOUS PORT PLACEMENT Left 06/02/15    SUBCLAVIAN VEIN    WRIST GANGLION EXCISION Left        Prior to Admission medications    Medication Sig Start Date End Date Taking?  Authorizing Provider   verapamil (CALAN SR) 180 MG extended release tablet take 1 tablet by mouth once daily 17  Yes Kirill Collado MD   albuterol sulfate HFA (VENTOLIN HFA) 108 (90 Base) MCG/ACT inhaler Inhale 2 puffs into the lungs every 6 hours as needed for Wheezing 8/29/17  Yes Suma Mann MD   levothyroxine (SYNTHROID) 50 MCG tablet take 1 tablet by mouth once daily 8/7/17  Yes Suma Mann MD   sertraline (ZOLOFT) 100 MG tablet Take 1 tablet by mouth daily 7/29/17  Yes Suma Mann MD   pravastatin (PRAVACHOL) 20 MG tablet take 1 tablet by mouth every evening 5/11/17  Yes Suma Mann MD   cefdinir (OMNICEF) 300 MG capsule  4/5/17  Yes Historical Provider, MD   aspirin 81 MG tablet Take 81 mg by mouth daily. Yes Historical Provider, MD   ibuprofen (ADVIL;MOTRIN) 800 MG tablet Take 1 tablet by mouth every 6 hours as needed for Pain. 6/11/14  Yes Suma Mann MD       Allergies   Allergen Reactions    Latex Rash    Codeine     Tramadol Itching    Pcn [Penicillins] Rash    Tape Sandralee Hensen Tape] Rash       Family History   Problem Relation Age of Onset    Heart Disease Mother     Colon Cancer Father     Colon Polyps Father     Other Father 58     Post Op    Other Brother      Accidental Shooting    Brain Cancer Child        Social History     Social History    Marital status:      Spouse name: N/A    Number of children: N/A    Years of education: N/A     Occupational History    Not on file.      Social History Main Topics    Smoking status: Never Smoker    Smokeless tobacco: Never Used    Alcohol use Yes      Comment: rare    Drug use: No    Sexual activity: Not on file     Other Topics Concern    Not on file     Social History Narrative       Review of Systems:   CONSTITUTIONAL:  negative for  fevers, chills, sweats, fatigue, malaise, anorexia and weight loss  EYES:  negative for  contacts, glasses, double vision, blurred vision, dry eyes, blind spots, eye discharge, visual disturbance, irritation, redness, icterus and color blindness  HEENT:  negative for  hearing loss, tinnitus, ear drainage, earaches, nasal congestion, epistaxis, snoring, sore mouth, sore throat, hoarseness and voice change  RESPIRATORY:  negative for  dry cough, cough with sputum, dyspnea, wheezing, hemoptysis, chest pain, pleuritic pain and cyanosis  CARDIOVASCULAR:  Positive for continuous chest pain, no dyspnea, palpitations, orthopnea, PND, exertional chest pressure/discomfort, fatigue, early saiety, edema, syncope  GASTROINTESTINAL:  negative for nausea, vomiting, change in bowel habits, diarrhea, constipation,Has abdominal pain,  No pruritus, abdominal mass, abdominal distention, jaundice, dysphagia, reflux, regurgitation, odynophagia, hematemesis and hemtochezia  GENITOURINARY:  negative for frequency, dysuria, nocturia, urinary incontinence, hesitancy, decreased stream and hematuria  INTEGUMENT/BREAST:  negative for rash, skin lesion(s), dryness, skin color change, changes in lesion, pruritus, changes in hair, changes in nails, breast lump, nipple discharge and breast tenderness  HEMATOLOGIC/LYMPHATIC:  negative for easy bruising, bleeding, lymphadenopathy, petechiae and swelling/edema  ALLERGIC/IMMUNOLOGIC:  negative for recurrent infections, urticaria, hay fever, angioedema, anaphylaxis and drug reactions  ENDOCRINE:  negative for heat intolerance, cold intolerance, tremor, weight changes, change in bowel habits, hair loss and diabetic symptoms including neither polyuria nor polydipsia nor blurred vision nor foot ulcerations nor polyphagia nor skin dryness nor pruritus nor neuropathy nor nausea nor vomiting nor diarrhea  MUSCULOSKELETAL:  negative for  myalgias, arthralgias, pain, joint swelling, stiff joints, decreased range of motion, muscle weakness and bone pain  NEUROLOGICAL:  negative  BEHAVIOR/PSYCH:  negative for poor appetite, increased appetite, decreased sleep, increased sleep, decreased energy level, increased energy level, poor concentration, depressed mood, elated mood, fatigue, agitated, increased agitation and anxiety    Physical

## 2017-09-29 NOTE — FLOWSHEET NOTE
Pt stated that her PCP saw her about a year ago and stated that she had a \"Slight heart attack. \" Pt stated when that had happened, she had felt as though an elephant was sitting on her and she could not move. Remaining medical history is in the chart.

## 2017-09-29 NOTE — ED PROVIDER NOTES
Psychiatric/Behavioral: Negative for behavioral problems, self-injury and sleep disturbance. All other systems reviewed and are negative. Except as noted above the remainder of the review of systems was reviewed and negative. PAST MEDICAL HISTORY     Past Medical History:   Diagnosis Date    Anxiety     Arthritis     Asthma     Cancer ([address] 4/2015    Right breast cancer metastatic to axillary node    Chronic back pain     Depression     History of blood transfusion     Hyperlipidemia     Hypertension     Prolonged emergence from general anesthesia     SVT (supraventricular tachycardia) (HCC)     Thyroid disease          SURGICAL HISTORY       Past Surgical History:   Procedure Laterality Date    BREAST BIOPSY Right 4/16/15    ULTRASOUND GUIDED BIOPSY RIGHT AXILLA, RIGHT AXILLARY DISSECTION,ULTRASOUND GUIDED RIGHT BREAST BIOPSY    COLONOSCOPY  9/12/14    polypectomy jarmoszuk    [place] RMVL [facility] CTR VAD W/SUBQ PORT/ CTR/PRPH INSJ N/A 2/21/2017    PORT REMOVAL performed by Carlos Mercado MD at [address]      tail bone    TONSILLECTOMY      TUBAL LIGATION      TUNNELED VENOUS PORT PLACEMENT Left 06/02/15    SUBCLAVIAN VEIN    WRIST GANGLION EXCISION Left          CURRENT MEDICATIONS       Previous Medications    ALBUTEROL SULFATE HFA (VENTOLIN HFA) 108 (90 BASE) MCG/ACT INHALER    Inhale 2 puffs into the lungs every 6 hours as needed for Wheezing    ASPIRIN 81 MG TABLET    Take 81 mg by mouth daily. CEFDINIR (OMNICEF) 300 MG CAPSULE        IBUPROFEN (ADVIL;MOTRIN) 800 MG TABLET    Take 1 tablet by mouth every 6 hours as needed for Pain.     LEVOTHYROXINE (SYNTHROID) 50 MCG TABLET    take 1 tablet by mouth once daily    PRAVASTATIN (PRAVACHOL) 20 MG TABLET    take 1 tablet by mouth every evening    SERTRALINE (ZOLOFT) 100 MG TABLET    Take 1 tablet by mouth daily    VENLAFAXINE (EFFEXOR XR) 75 MG EXTENDED RELEASE CAPSULE    Take 1 capsule by mouth daily    VERAPAMIL (CALAN SR) 180 MG EXTENDED RELEASE TABLET    take 1 tablet by mouth once daily       ALLERGIES     Codeine; Tramadol; and Pcn [penicillins]    FAMILY HISTORY       Family History   Problem Relation Age of Onset    Heart Disease Mother     Colon Cancer Father     Colon Polyps Father     Other Father 58     Post Op    Other Brother      Accidental Shooting    Brain Cancer Child           SOCIAL HISTORY       Social History     Social History    Marital status:      Spouse name: N/A    Number of children: N/A    Years of education: N/A     Social History Main Topics    Smoking status: Never Smoker    Smokeless tobacco: Never Used    Alcohol use Yes      Comment: rare    Drug use: No    Sexual activity: Not Asked     Other Topics Concern    None     Social History Narrative       SCREENINGS    Dion Coma Scale  Eye Opening: Spontaneous  Best Verbal Response: Oriented  Best Motor Response: Obeys commands  Dion Coma Scale Score: 15        PHYSICAL EXAM    (up to 7 for level 4, 8 or more for level 5)   ED Triage Vitals   BP Temp Temp src Pulse Resp SpO2 Height Weight   -- -- -- -- -- -- -- --                 Physical Exam   Constitutional: She is oriented to person, place, and time. She appears well-developed and well-nourished. No distress. HENT:   Head: Normocephalic and atraumatic. Mouth/Throat: Oropharynx is clear and moist.   Eyes: Conjunctivae and EOM are normal. Pupils are equal, round, and reactive to light. Right eye exhibits no discharge. Left eye exhibits no discharge. Neck: Normal range of motion. Neck supple. Cardiovascular: Normal rate, regular rhythm, normal heart sounds and intact distal pulses. Pulmonary/Chest: Effort normal and breath sounds normal. No stridor. No respiratory distress. She has no wheezes. She has no rales. She exhibits tenderness. Left anterior chest wall tenderness    Abdominal: Soft. Bowel sounds are normal. She exhibits no distension.  There is tenderness. Right upper quadrant tenderness. Musculoskeletal: Normal range of motion. Neurological: She is alert and oriented to person, place, and time. Skin: Skin is warm. No erythema. Psychiatric: She has a normal mood and affect. Nursing note and vitals reviewed. DIAGNOSTIC RESULTS     EKG: All EKG's are interpreted by the Emergency Department Physician who either signs or Co-signs this chart in the absence of a cardiologist.    Junctional rhythm/ rate 60 / negative st elevation  RADIOLOGY:   Non-plain film images such as CT, Ultrasound and MRI are read by the radiologist. Plain radiographic images are visualized and preliminarily interpreted by the emergency physician with the below findings:    See final radiology report. Interpretation per the Radiologist below, if available at the time of this note:    XR Chest Portable    (Results Pending)         ED BEDSIDE ULTRASOUND:   Performed by ED Physician - none    LABS:  Labs Reviewed   COMPREHENSIVE METABOLIC PANEL - Abnormal; Notable for the following:        Result Value    Anion Gap 14 (*)     All other components within normal limits   CBC WITH AUTO DIFFERENTIAL - Abnormal; Notable for the following:     MCHC 32.6 (*)     Monocytes # 0.9 (*)     All other components within normal limits   URINE RT REFLEX TO CULTURE - Abnormal; Notable for the following:     Leukocyte Esterase, Urine TRACE (*)     All other components within normal limits   URINE CULTURE   LIPASE   LACTIC ACID, PLASMA   TROPONIN   D-DIMER, QUANTITATIVE   CK   MICROSCOPIC URINALYSIS       All other labs were within normal range or not returned as of this dictation.     EMERGENCY DEPARTMENT COURSE and DIFFERENTIAL DIAGNOSIS/MDM:   Vitals:    Vitals:    09/28/17 2355 09/28/17 2357 09/29/17 0007 09/29/17 0017   BP:  (!) 130/105 129/67 121/68   Pulse: 55 56 62 60   Resp:  24 16 18   SpO2:  99% 99% 99%   Weight:       Height:                MDM  Number of Diagnoses or Management Options  Chest pain, unspecified type:   Pain of upper abdomen:   Diagnosis management comments: D/wDr Theresa Carrie  OK to admit. Amount and/or Complexity of Data Reviewed  Clinical lab tests: ordered and reviewed  Tests in the radiology section of CPT®: ordered and reviewed  Tests in the medicine section of CPT®: reviewed  Discuss the patient with other providers: yes             CONSULTS:  IP CONSULT TO HOSPITALIST    PROCEDURES:  Unless otherwise noted below, none     Procedures    FINAL IMPRESSION      1. Chest pain, unspecified type    2. Pain of upper abdomen          DISPOSITION/PLAN   DISPOSITION     PATIENT REFERRED TO:  Alex Conti MD  [address] [place] [address]  [phone]            DISCHARGE MEDICATIONS:  New Prescriptions    No medications on file     Attestation: The Prescription Monitoring Report for this patient was reviewed today.  Lai Shell PA-C)    (Please note that portions of this note were completed with a voice recognition program.  Efforts were made to edit the dictations but occasionally words are mis-transcribed.)    Lai Shell PA-C (electronically signed)  Attending Emergency Physician         Lai Shell PA-C  09/29/17 7401

## 2017-09-29 NOTE — CARE COORDINATION
SPOKE WITH PATIENT- Markell AT [address] IN [place]/ PT C/O HEADACHE- RN MADE AWARE/ [facility]

## 2017-09-29 NOTE — ED NOTES
Pt states chest pain 0/10. Abd pain/back pain 8/10. Pt medicated with 2mg Morphine IV.      Kristan Alvarez, RN  58/23/79 3254

## 2017-09-29 NOTE — PROGRESS NOTES
Ernie Major is a 58 y.o. female patient. Pt was seen and evalauted, complains of epigastric pain    Current Facility-Administered Medications   Medication Dose Route Frequency Provider Last Rate Last Dose    [START ON 9/30/2017] influenza type A&B vaccine (FLUVIRIN) injection 0.5 mL  0.5 mL Intramuscular Once Thao Zepeda MD        aspirin chewable tablet 81 mg  81 mg Oral Daily Thao Zepeda MD   81 mg at 09/29/17 0847    levothyroxine (SYNTHROID) tablet 50 mcg  50 mcg Oral Daily Thao Zepeda MD   50 mcg at 09/29/17 0700    pravastatin (PRAVACHOL) tablet 20 mg  20 mg Oral Nightly Thao Zepeda MD        sertraline (ZOLOFT) tablet 100 mg  100 mg Oral Daily Thao Zepeda MD   100 mg at 09/29/17 0847    verapamil (CALAN SR) extended release tablet 180 mg  180 mg Oral Nightly Thao Zepeda MD        sodium chloride flush 0.9 % injection 10 mL  10 mL Intravenous 2 times per day Thao Zepeda MD   10 mL at 09/29/17 0850    sodium chloride flush 0.9 % injection 10 mL  10 mL Intravenous PRN Thao Zepeda MD        acetaminophen (TYLENOL) tablet 650 mg  650 mg Oral Q4H PRN Thao Zepeda MD   650 mg at 09/29/17 1013    magnesium hydroxide (MILK OF MAGNESIA) 400 MG/5ML suspension 30 mL  30 mL Oral Daily PRN Thao Zepeda MD        ondansetron [facility]) injection 4 mg  4 mg Intravenous Q6H PRN Thao Zepeda MD        enoxaparin (LOVENOX) injection 40 mg  40 mg Subcutaneous Daily Thao Zepeda MD   40 mg at 09/29/17 0847    pantoprazole (PROTONIX) injection 40 mg  40 mg Intravenous BID Joe Linares MD   40 mg at 09/29/17 0957     Allergies   Allergen Reactions    Latex Rash    Codeine     Tramadol Itching    Pcn [Penicillins] Rash    Tape Neil Sebastian Tape] Rash     Active Problems:    * No active hospital problems. *    Blood pressure 129/68, pulse 66, temperature 97.5 °F (36.4 °C), temperature source Oral, resp.  rate 16, height 5' 4\" (1.626 m), weight 132 lb 15 oz (60.3 kg), SpO2 99 %, not currently breastfeeding. Subjective:  Symptoms:  She reports malaise and weakness. No headache. Diet:  She reports  nausea. No vomiting. Pain:  She complains of pain that is moderate. Objective:  General Appearance:  Ill-appearing. Vital signs: (most recent): Blood pressure 129/68, pulse 66, temperature 97.5 °F (36.4 °C), temperature source Oral, resp. rate 16, height 5' 4\" (1.626 m), weight 132 lb 15 oz (60.3 kg), SpO2 99 %, not currently breastfeeding. HEENT: Normal HEENT exam.    Lungs:  Normal respiratory rate and normal effort. Breath sounds clear to auscultation. Heart: Normal rate. Regular rhythm. S1 normal and S2 normal.    Neurological: Patient is alert and oriented to person, place and time. Skin:  Warm and dry. Abdomen: (+ epigastric pain)  Pupils:  Pupils are equal, round, and reactive to light.       Lab Results   Component Value Date    WBC 4.8 09/29/2017    HGB 12.5 09/29/2017    HCT 38.1 09/29/2017    MCV 92.3 09/29/2017     09/29/2017     Lab Results   Component Value Date     09/29/2017    K 4.0 09/29/2017     09/29/2017    CO2 23 09/29/2017    BUN 23 09/29/2017    CREATININE 0.59 09/29/2017    GLUCOSE 94 09/29/2017    CALCIUM 8.7 09/29/2017      Past Medical History:   Diagnosis Date    Anxiety     Arthritis     Asthma     Cancer ([address] 4/2015    Right breast cancer metastatic to axillary node    Chronic back pain     Depression     History of blood transfusion     Hyperlipidemia     Hypertension     Prolonged emergence from general anesthesia     SVT (supraventricular tachycardia) ([address]     Thyroid disease          Assessment & Plan   1) + nephrolithasis  FU abd u/s  2) CP resolved, atypical  3) HTN stable  4) asthma stable    Tej Bhat MD  9/29/2017

## 2017-09-30 ENCOUNTER — APPOINTMENT (OUTPATIENT)
Dept: ULTRASOUND IMAGING | Age: 62
End: 2017-09-30
Payer: MEDICAID

## 2017-09-30 ENCOUNTER — APPOINTMENT (OUTPATIENT)
Dept: GENERAL RADIOLOGY | Age: 62
End: 2017-09-30
Payer: MEDICAID

## 2017-09-30 VITALS
HEIGHT: 64 IN | OXYGEN SATURATION: 97 % | DIASTOLIC BLOOD PRESSURE: 52 MMHG | RESPIRATION RATE: 18 BRPM | HEART RATE: 56 BPM | TEMPERATURE: 97.5 F | WEIGHT: 132.94 LBS | BODY MASS INDEX: 22.7 KG/M2 | SYSTOLIC BLOOD PRESSURE: 147 MMHG

## 2017-09-30 PROBLEM — R10.13 EPIGASTRIC ABDOMINAL PAIN: Status: ACTIVE | Noted: 2017-09-30

## 2017-09-30 PROBLEM — M51.369 DEGENERATION, INTERVERTEBRAL DISC, LUMBAR: Status: ACTIVE | Noted: 2017-09-30

## 2017-09-30 PROBLEM — M54.50 LUMBAR SPINE PAINFUL ON MOVEMENT: Status: ACTIVE | Noted: 2017-09-30

## 2017-09-30 PROBLEM — M51.36 DEGENERATION, INTERVERTEBRAL DISC, LUMBAR: Status: ACTIVE | Noted: 2017-09-30

## 2017-09-30 LAB — URINE CULTURE, ROUTINE: NORMAL

## 2017-09-30 PROCEDURE — G0378 HOSPITAL OBSERVATION PER HR: HCPCS

## 2017-09-30 PROCEDURE — 96376 TX/PRO/DX INJ SAME DRUG ADON: CPT

## 2017-09-30 PROCEDURE — 6370000000 HC RX 637 (ALT 250 FOR IP): Performed by: ANESTHESIOLOGY

## 2017-09-30 PROCEDURE — 2700000000 HC OXYGEN THERAPY PER DAY

## 2017-09-30 PROCEDURE — 72110 X-RAY EXAM L-2 SPINE 4/>VWS: CPT

## 2017-09-30 PROCEDURE — G0008 ADMIN INFLUENZA VIRUS VAC: HCPCS | Performed by: INTERNAL MEDICINE

## 2017-09-30 PROCEDURE — 76705 ECHO EXAM OF ABDOMEN: CPT

## 2017-09-30 PROCEDURE — 96372 THER/PROPH/DIAG INJ SC/IM: CPT

## 2017-09-30 PROCEDURE — 6370000000 HC RX 637 (ALT 250 FOR IP): Performed by: INTERNAL MEDICINE

## 2017-09-30 PROCEDURE — 2580000003 HC RX 258: Performed by: INTERNAL MEDICINE

## 2017-09-30 PROCEDURE — 6360000002 HC RX W HCPCS: Performed by: INTERNAL MEDICINE

## 2017-09-30 PROCEDURE — 90656 IIV3 VACC NO PRSV 0.5 ML IM: CPT | Performed by: INTERNAL MEDICINE

## 2017-09-30 PROCEDURE — 93010 ELECTROCARDIOGRAM REPORT: CPT | Performed by: INTERNAL MEDICINE

## 2017-09-30 PROCEDURE — C9113 INJ PANTOPRAZOLE SODIUM, VIA: HCPCS | Performed by: INTERNAL MEDICINE

## 2017-09-30 RX ORDER — OXYCODONE HYDROCHLORIDE AND ACETAMINOPHEN 5; 325 MG/1; MG/1
1 TABLET ORAL EVERY 6 HOURS PRN
Qty: 15 TABLET | Refills: 0 | Status: SHIPPED | OUTPATIENT
Start: 2017-09-30 | End: 2017-10-05

## 2017-09-30 RX ORDER — ACETAMINOPHEN 325 MG/1
650 TABLET ORAL EVERY 8 HOURS
Status: DISCONTINUED | OUTPATIENT
Start: 2017-09-30 | End: 2017-09-30 | Stop reason: HOSPADM

## 2017-09-30 RX ORDER — METHOCARBAMOL 500 MG/1
500 TABLET, FILM COATED ORAL 3 TIMES DAILY PRN
Qty: 30 TABLET | Refills: 1 | Status: SHIPPED | OUTPATIENT
Start: 2017-09-30 | End: 2017-10-10

## 2017-09-30 RX ORDER — OXYCODONE HYDROCHLORIDE AND ACETAMINOPHEN 5; 325 MG/1; MG/1
1 TABLET ORAL EVERY 6 HOURS PRN
Status: DISCONTINUED | OUTPATIENT
Start: 2017-09-30 | End: 2017-09-30 | Stop reason: HOSPADM

## 2017-09-30 RX ORDER — METHOCARBAMOL 500 MG/1
500 TABLET, FILM COATED ORAL 3 TIMES DAILY PRN
Status: DISCONTINUED | OUTPATIENT
Start: 2017-09-30 | End: 2017-09-30 | Stop reason: HOSPADM

## 2017-09-30 RX ORDER — MELOXICAM 7.5 MG/1
7.5 TABLET ORAL 2 TIMES DAILY WITH MEALS
Qty: 30 TABLET | Refills: 3 | Status: SHIPPED | OUTPATIENT
Start: 2017-09-30 | End: 2018-02-01

## 2017-09-30 RX ORDER — MELOXICAM 7.5 MG/1
7.5 TABLET ORAL 2 TIMES DAILY WITH MEALS
Status: DISCONTINUED | OUTPATIENT
Start: 2017-09-30 | End: 2017-09-30 | Stop reason: HOSPADM

## 2017-09-30 RX ORDER — LIDOCAINE 50 MG/G
2 PATCH TOPICAL DAILY
Qty: 60 PATCH | Refills: 2 | Status: SHIPPED | OUTPATIENT
Start: 2017-09-30 | End: 2017-10-03 | Stop reason: SDUPTHER

## 2017-09-30 RX ORDER — LIDOCAINE 50 MG/G
2 PATCH TOPICAL DAILY
Status: DISCONTINUED | OUTPATIENT
Start: 2017-09-30 | End: 2017-09-30 | Stop reason: HOSPADM

## 2017-09-30 RX ADMIN — SERTRALINE HYDROCHLORIDE 100 MG: 100 TABLET ORAL at 12:41

## 2017-09-30 RX ADMIN — OXYCODONE HYDROCHLORIDE AND ACETAMINOPHEN 1 TABLET: 5; 325 TABLET ORAL at 12:26

## 2017-09-30 RX ADMIN — A/SINGAPORE/GP1908/2015 IVR-180 (AN A/MICHIGAN/45/2015 (H1N1)PDM09-LIKE VIRUS, A/HONG KONG/4801/2014, NYMC X-263B (H3N2) (AN A/HONG KONG/4801/2014-LIKE VIRUS), AND B/BRISBANE/60/2008, WILD TYPE (A B/BRISBANE/60/2008-LIKE VIRUS) 0.5 ML: 15; 15; 15 INJECTION, SUSPENSION INTRAMUSCULAR at 18:32

## 2017-09-30 RX ADMIN — ASPIRIN 81 MG CHEWABLE TABLET 81 MG: 81 TABLET CHEWABLE at 12:42

## 2017-09-30 RX ADMIN — Medication 10 ML: at 10:53

## 2017-09-30 RX ADMIN — PANTOPRAZOLE SODIUM 40 MG: 40 INJECTION, POWDER, FOR SOLUTION INTRAVENOUS at 10:45

## 2017-09-30 RX ADMIN — ENOXAPARIN SODIUM 40 MG: 40 INJECTION SUBCUTANEOUS at 10:45

## 2017-09-30 RX ADMIN — LEVOTHYROXINE SODIUM 50 MCG: 50 TABLET ORAL at 15:50

## 2017-09-30 RX ADMIN — MELOXICAM 7.5 MG: 7.5 TABLET ORAL at 18:31

## 2017-09-30 ASSESSMENT — PAIN SCALES - GENERAL
PAINLEVEL_OUTOF10: 4
PAINLEVEL_OUTOF10: 7
PAINLEVEL_OUTOF10: 10
PAINLEVEL_OUTOF10: 10
PAINLEVEL_OUTOF10: 6

## 2017-09-30 ASSESSMENT — PAIN DESCRIPTION - PAIN TYPE
TYPE: ACUTE PAIN
TYPE: ACUTE PAIN

## 2017-09-30 ASSESSMENT — PAIN DESCRIPTION - LOCATION
LOCATION: BACK
LOCATION: BACK

## 2017-09-30 ASSESSMENT — PAIN DESCRIPTION - ORIENTATION: ORIENTATION: RIGHT

## 2017-09-30 NOTE — FLOWSHEET NOTE
Orders received from DR. Dey. Pt to remain NPO until [facility] resulted to assess need for HIDA scan. Applied ice pack to pts back.

## 2017-09-30 NOTE — FLOWSHEET NOTE
Pt given flu vaccine in left deltoid. Sl and tele dc'd discharge instructions printed and reviewed with pt.

## 2017-09-30 NOTE — CONSULTS
Patient was seen . Pain management consult was dictated. Orders were done . Treament plan  was suggested. Chief Complaint   Patient presents with    Chest Pain     Patient has more of epigastric pain  On interview she states has back pain, history of breat cancer in remission/ non radiating      Past Medical History:   Diagnosis Date    Anxiety     Arthritis     Asthma     [facility] [address] [address] 4/2015    Right breast cancer metastatic to axillary node    Chronic back pain     Depression     History of blood transfusion     Hyperlipidemia     Hypertension     Prolonged emergence from general anesthesia     SVT [facility]ra[facility]ricular [facility]) [address] [address]     Thyroid disease        Past Surgical History:   Procedure Laterality Date    BREAST BIOPSY Right 4/16/15    ULTRASOUND GUIDED BIOPSY RIGHT AXILLA, RIGHT AXILLARY DISSECTION,ULTRASOUND GUIDED RIGHT BREAST BIOPSY    COLONOSCOPY  9/12/14    polypectomy jarmoszuk    [place] RMVL KELLY CTR VAD W/SUBQ PORT/ CTR/PRPH INSJ N/A 2/21/2017    PORT REMOVAL performed by Tony Linares MD at [address]      tail bone    TONSILLECTOMY      TUBAL LIGATION      TUNNELED VENOUS PORT PLACEMENT Left 06/02/15    SUBCLAVIAN VEIN    WRIST GANGLION EXCISION Left          Social History     Social History    Marital status:      Spouse name: N/A    Number of children: N/A    Years of education: N/A     Occupational History    Not on file. Social History Main Topics    Smoking status: Never Smoker    Smokeless tobacco: Never Used    Alcohol use Yes      Comment: rare    Drug use: No    Sexual activity: Not on file     Other Topics Concern    Not on file     Social History Narrative     Family history is reviewed    No current facility-administered medications on file prior to encounter.       Current Outpatient Prescriptions on File Prior to Encounter   Medication Sig Dispense Refill    verapamil (CALAN SR) 180 MG extended release tablet take 1 evaluated with an ultrasound scan if clinically warranted. There are no dilated intrahepatic or extrahepatic bile ducts. The liver, spleen, pancreas, kidneys and adrenal glands appear normal. There is no retroperitoneal adenopathy. There is a small hiatal hernia and remainder of the upper GI tract appears unremarkable. The appendix appears normal. Nondilated large and small bowel appear unremarkable. No CT evidence of appendicitis, diverticulitis or colitis. Also, there is no bowel obstruction. There is no abdominal mass or \"free fluid\", abscess or pneumoperitoneum in the abdomen. CT scan of pelvis demonstrates normal appearance of the uterus and adnexa. There is a nondistended urinary bladder. There is no pelvic mass, adenopathy or \"free fluid\" in the pelvis. There are metallic streak artifacts from several surgical clips within the pelvis. There is no inguinal hernia or adenopathy. Visualized osseous structures unremarkable. 1. Contracted gallbladder with cholelithiasis and this can be further evaluated with an ultrasound scan. 2. No organomegaly, mass, \"free fluid\", abscess or pneumoperitoneum in the abdomen. 3. Normal appendix and nondilated large and small bowel appear unremarkable. 4. Metallic streak artifacts in pelvis from surgical clips otherwise, unremarkable CT pelvis. All CT scans at this facility use dose modulation, iterative reconstruction, and/or weight based dosing when appropriate to reduce radiation dose to as low as reasonably achievable. Xr Chest Portable    Result Date: 9/29/2017  CLINICAL HISTORY: Chest pain COMPARISON: June 2, 2015 CHEST, 1 VIEWS Cardiac and mediastinal silhouettes are normal in size and contour. No focal areas of consolidations are noted. No effusion or pneumothorax is seen. The osseous structures are grossly unremarkable. Degenerative changes are seen in the cervical spine. Surgical clips project over the right axilla.  Lines and leads also project over the chest.

## 2017-09-30 NOTE — PROGRESS NOTES
Hospitalist Progress Note      PCP: Dana Ca MD    Date of Admission: 9/28/2017    Chief Complaint:  Epigastric pain    HPI: Patient continues to have right upper quadrant pain especially with movement like when getting out of the bed, she has \"very bad sciatica pain\"this is exacerbated from her baseline. She had a RUQ U/S which showed cholelithiasis without cholecystitis or biliary dilatation. Subjective: :Constitutional: No fever, chills, weakness, otherwise negative  Eyes: No eye pain or redness, otherwise negative  Ears, nose, mouth, throat, and face: No ear ache, no nose bleed no sore throat otherwise negative  Respiratory: No cough, sob, hemoptysis, otherwise  negative  Cardiovascular: No chest pain, palpitation, otherwise negative  Gastrointestinal: No nausea, vomiting diarrhea otherwise negative  Genitourinary:No hematuria, no dysuria, no frequency otherwise negative  Integument/breast: No pain, discomfort, redness otherwise negative  Hematologic/lymphatic: No bleed, lymph node swelling, petechia otherwise negative  Musculoskeletal: See above  Neurological: No headache, seizure, loss of consciousness otherwise negative  Behavioral/Psych: No depression, suicidal or homicidal ideation otherwise negative  Endocrine: No thyroid pain, warmth or tenderness.  No wt loss otherwise negative  Allergic/Immunologic: No sneezing, itching or rash otherwise negative        Medications:  Reviewed    Infusion Medications    Scheduled Medications    acetaminophen  650 mg Oral Q8H    lidocaine  2 patch Transdermal Daily    influenza virus vaccine  0.5 mL Intramuscular Once    aspirin  81 mg Oral Daily    levothyroxine  50 mcg Oral Daily    pravastatin  20 mg Oral Nightly    sertraline  100 mg Oral Daily    verapamil  180 mg Oral Nightly    sodium chloride flush  10 mL Intravenous 2 times per day    enoxaparin  40 mg Subcutaneous Daily    pantoprazole  40 mg Intravenous BID     PRN Meds: sodium chloride flush, magnesium hydroxide, ondansetron      Intake/Output Summary (Last 24 hours) at 09/30/17 1047  Last data filed at 09/29/17 1300   Gross per 24 hour   Intake              120 ml   Output                0 ml   Net              120 ml       Exam:    BP (!) 152/56  Pulse (!) 49  Temp 97.7 °F (36.5 °C) (Oral)   Resp 18  Ht 5' 4\" (1.626 m)  Wt 132 lb 15 oz (60.3 kg)  LMP  (LMP Unknown)  SpO2 100%  BMI 22.82 kg/m2    General appearance: No apparent distress, appears stated age and cooperative. HEENT: Pupils equal, round, and reactive to light. Conjunctivae/corneas clear. Neck: Supple, with full range of motion. No jugular venous distention. Trachea midline. Respiratory:  Normal respiratory effort. Clear to auscultation, bilaterally without Rales/Wheezes/Rhonchi. Cardiovascular: Regular rate and rhythm with normal S1/S2 without murmurs, rubs or gallops. Abdomen: Very tender to palpation right upper quadrant, non-distended with normal bowel sounds. Musculoskeletal: No clubbing, cyanosis or edema bilaterally. Full range of motion without deformity. Skin: Skin color, texture, turgor normal.  No rashes or lesions. Neuro: Non Focal. Symetrical motor and tone. Nl Comprehension, Alert,awake and oriented. NL CN. Symetrical tone and reflexes. Psychiatric: Alert and oriented, thought content appropriate, normal insight  Capillary Refill: Brisk,< 3 seconds   Peripheral Pulses: +2 palpable, equal bilaterally       Labs:   Recent Labs      09/28/17   2335 09/29/17   0650   WBC  5.6  4.8   HGB  14.6  12.5   HCT  44.8  38.1   PLT  201  163     Recent Labs      09/28/17 2348 09/29/17   0650   NA  141  142   K  4.4  4.0   CL  101  103   CO2  26  23   BUN  22  23   CREATININE  0.65  0.59   CALCIUM  9.8  8.7     Recent Labs      09/28/17 2348 09/29/17   0650   AST  18  15   ALT  13  11   BILITOT  0.1  0.2   ALKPHOS  86  70     No results for input(s): INR in the last 72 hours.   Recent Labs      09/28/17 2348 additional time explaining care, normal and abnormal findings, and treatment plan. All of pt questions were answered. Counseling, diet and education were  provided. Case will be discussed with nursing staff when appropriate. Family will be updated if and when appropriate.       Diet: DIET LOW SODIUM 2 GM;    Code Status: Full Code    PT/OT Eval           Electronically signed by Bandar Beebe DO on 9/30/2017 at 10:47 AM

## 2017-09-30 NOTE — DISCHARGE SUMMARY
Hospital Medicine Discharge Summary    Keyanna Goldberg  :    MRN:  [id number]    Admit date:  2017  Discharge date:  2017    Admitting Physician: Veronique Moran MD  Primary Care Physician:  Hero Chiu MD      Discharge Diagnoses:    Principal Problem:    Epigastric abdominal pain  Active Problems:    Chronic back pain    Lumbar spine painful on movement    Degeneration, intervertebral disc, lumbar      Hospital Course:   Keyanna Goldberg is a 58 y.o. female that was admitted and treated at [facility] for the following medical issues:     Principal Problem:    Epigastric abdominal pain  Active Problems:    Chronic back pain    Lumbar spine painful on movement    Degeneration, intervertebral disc, lumbar      Patient was admitted with epigastric pain, chest pain. ACS was ruled out. Patient has CT of the abdomen and right upper car ultrasound were both negative. Concern for previous remote musculoskeletal trauma to the right lower ribs anteriorly. Pain management was consulted. She has a history of sciatica and she has exacerbation of her pain. Pain management is recommended L spine x-ray and no urgent need for an MRI as there is no neurologic deficit at this time. She was started on the pain regimen with plan to follow-up in 2 weeks. Dr. Betty Rendon and will follow-up on the L spine x-ray when he sees patient in office. Pt was discharge in a stable condition. For more details, please see my notes from today.      Patient was seen by the following consultants while admitted to [facility]:   Consults:  IP CONSULT TO HOSPITALIST  IP CONSULT TO PAIN MANAGEMENT    Significant Diagnostic Studies:    Ct Abdomen Pelvis W Iv Contrast Additional Contrast? Oral    Result Date: 2017  EXAMINATION:  CT ABDOMEN AND PELVIS WITH IV CONTRAST CLINICAL HISTORY:  History of right breast cancer, patient complains of severe epigastric pain and nausea COMPARISON: consolidations are noted. No effusion or pneumothorax is seen. The osseous structures are grossly unremarkable. Degenerative changes are seen in the cervical spine. Surgical clips project over the right axilla. Lines and leads also project over the chest. IMPRESSION NO RADIOGRAPHIC EVIDENCE OF AN ACUTE CARDIOPULMONARY PROCESS. Discharge Medications:       Sanjay Arce   Home Medication Instructions FVU:[id number]    Printed on:09/30/17 6576   Medication Information                      albuterol sulfate HFA (VENTOLIN HFA) 108 (90 Base) MCG/ACT inhaler  Inhale 2 puffs into the lungs every 6 hours as needed for Wheezing             aspirin 81 MG tablet  Take 81 mg by mouth daily. cefdinir (OMNICEF) 300 MG capsule               levothyroxine (SYNTHROID) 50 MCG tablet  take 1 tablet by mouth once daily             lidocaine (LIDODERM) 5 %  Place 2 patches onto the skin daily 12 hours on, 12 hours off.             meloxicam (MOBIC) 7.5 MG tablet  Take 1 tablet by mouth 2 times daily (with meals)             methocarbamol (ROBAXIN) 500 MG tablet  Take 1 tablet by mouth 3 times daily as needed (back muscle spasm)             oxyCODONE-acetaminophen (PERCOCET) 5-325 MG per tablet  Take 1 tablet by mouth every 6 hours as needed for Pain (for back pain) . pravastatin (PRAVACHOL) 20 MG tablet  take 1 tablet by mouth every evening             sertraline (ZOLOFT) 100 MG tablet  Take 1 tablet by mouth daily             verapamil (CALAN SR) 180 MG extended release tablet  take 1 tablet by mouth once daily                 Disposition:   Home    Condition at discharge: Pt was medically stable at the time of discharge. Activity: activity as tolerated    Total time taken for discharging this patient: 40 minutes. Greater than 70% of time was spent focused exclusively on this patient.  Time was taken to review chart, discuss plans with consultants, reconciling medications, discussing plan answering questions with patient. Signed:  Eitan Collado  9/30/2017, 5:18 PM  ----------------------------------------------------------------------------------------------------------------------    Salo Cleveland,     Please return to ER or call 911 if you develop any significant signs or symptoms.     I may not have addressed all of your medical illnesses or the abnormal blood work or imaging therefore please ask your PCP, Kevin Lieberman MD ,  to obtain [phone] [facility] record to follow up on all of the abnormal labs, imaging and findings that I have and have not addressed during your hospitalization.      Discharging you from the hospital does not mean that your medical care ends here and now. You may still need additional work up, investigation, monitoring, and treatment to be handled from this point on by outside providers including your PCP, Kevin Lieberman MD , Specialists and other healthcare providers.      Please review your list of discharge medications prior to resuming medications you might still have at home, as the medications you need to be taking, dosages or how often you must take them may have changed. For medication questions, contact your retail pharmacy and your PCP, Kevin Lieberman MD .     ** I STRONGLY RECOMMEND that you follow up with Kevin Lieberman MD within 3 to 5 days for a post hospitalization evaluation. This specific office visit is covered by your insurance, and is not the same as your annual doctor visit/ check up. This office visit is important, as it may prevent need for repeat and/or future hospitalizations. **    Your medical team at [facility]) appreciates the opportunity to work with you to get well!     Sincerely,  Eitan Collado

## 2017-10-02 ENCOUNTER — TELEPHONE (OUTPATIENT)
Dept: FAMILY MEDICINE CLINIC | Age: 62
End: 2017-10-02

## 2017-10-02 LAB
EKG ATRIAL RATE: 55 BPM
EKG ATRIAL RATE: 56 BPM
EKG ATRIAL RATE: 57 BPM
EKG ATRIAL RATE: 63 BPM
EKG P AXIS: 47 DEGREES
EKG P AXIS: 50 DEGREES
EKG P AXIS: 58 DEGREES
EKG P-R INTERVAL: 140 MS
EKG P-R INTERVAL: 142 MS
EKG P-R INTERVAL: 158 MS
EKG Q-T INTERVAL: 424 MS
EKG Q-T INTERVAL: 450 MS
EKG Q-T INTERVAL: 468 MS
EKG Q-T INTERVAL: 496 MS
EKG QRS DURATION: 72 MS
EKG QRS DURATION: 82 MS
EKG QRS DURATION: 82 MS
EKG QRS DURATION: 84 MS
EKG QTC CALCULATION (BAZETT): 430 MS
EKG QTC CALCULATION (BAZETT): 430 MS
EKG QTC CALCULATION (BAZETT): 455 MS
EKG QTC CALCULATION (BAZETT): 478 MS
EKG R AXIS: 18 DEGREES
EKG R AXIS: 19 DEGREES
EKG R AXIS: 20 DEGREES
EKG R AXIS: 22 DEGREES
EKG T AXIS: 11 DEGREES
EKG T AXIS: 13 DEGREES
EKG T AXIS: 17 DEGREES
EKG T AXIS: 9 DEGREES
EKG VENTRICULAR RATE: 55 BPM
EKG VENTRICULAR RATE: 56 BPM
EKG VENTRICULAR RATE: 57 BPM
EKG VENTRICULAR RATE: 62 BPM

## 2017-10-02 RX ORDER — PANTOPRAZOLE SODIUM 40 MG/1
40 TABLET, DELAYED RELEASE ORAL DAILY
Qty: 30 TABLET | Refills: 3 | Status: SHIPPED | OUTPATIENT
Start: 2017-10-02 | End: 2018-02-05 | Stop reason: SDUPTHER

## 2017-10-02 RX ORDER — LIDOCAINE 50 MG/G
1 PATCH TOPICAL DAILY
Qty: 30 PATCH | Refills: 3 | Status: SHIPPED | OUTPATIENT
Start: 2017-10-02 | End: 2018-02-01

## 2017-10-02 NOTE — TELEPHONE ENCOUNTER
Suzanne Loyola 1626 IP Discharge Follow up Call    Date of discharge: 9-30-17  Facility: [facility] [place]  Non-face-to-face services provided:  Scheduled appointment with PCP-Dr Etienne Landrum and reviewed discharge summary and/or continuity of care documents    Reason for Hospital Visit:  Epigastric/abdominal pain  Discharged with Home Health?:  No    Date of first visit after discharge:  10-3-17 Dr Eric Delcid  Status:     improved    Did you receive a discharge summary with list of medication from the hospital? Yes  Review of Instructions:     Understands what to report/when to return?:  Yes   Understands discharge instructions?:  Yes   Following discharge instructions?:  Yes   If not why? Is there any lingering symptoms? Yes -abdominal pain. 5 on pain scale  Are you eating and drinking OK? Yes-acid reflux-new script for protonix  Any other problems i.e. Constipation, other symptoms? No  Are there any new complaints of pain? No  If you have a wound is the dressing clean, dry, and intact? N/A-none per AVS  Understands wound care regimen? N/A  Are there any other complaints/concerns that you wish to tell your provider? Patient complains of back pain/sciatia-seeing pain management      FU appts/Provider:    Future Appointments  Date Time Provider Sumaya Villalobos   10/3/2017 7:15 PM Cristiana Kelly MD [facility]   10/13/2017 8:30 AM [facility] ROOM 2 [facility]   10/13/2017 9:00 AM [facility] RAD       New Medications at discharge?:     Yes                      Medication Reconciliation by phone -     Each medication was reviewed (both pre and post hospitalization)  Yes    Were there discrepancies in medications? No  If YES, were discrepancies addressed? Not Applicable    Understands Medications? Yes   Questions about medications from pt or caregiver? Not Applicable   Questions addressed?  Not Applicable                Has all of medication

## 2017-10-02 NOTE — CONSULTS
[address]                     [address], [address] [facility]                                 CONSULTATION    PATIENT NAME: Barbara Rosenbaum                    :             1955  MED REC NO:   [id number]                           ROOM:           W190  ACCOUNT NO:   [de-identified]                          ADMISSION DATE:  2017  PROVIDER:     Jeannette Sampson MD    CONSULT DATE:  2017    CHIEF COMPLAINT:  The patient has chest wall pain with epigastric pain  as well as radiating back pain. HISTORY:  The patient had history of breast cancer in the past.  She  was admitted for chronic persistent back pain. She stated that the  pain comes back and forth. She had history of breast cancer, however,  she is in remission and the pain in the back is mostly nonradiating. I was consulted to come and see the patient. She is rating the pain  as 4 to 5. The patient states the pain has been consistent with  movement and twisting. She denies any problem with bladder or bowel  control. She denied any also problem with radiating pain down the  leg. The patient's past medical history, surgical history, social  history, family history, medication list, lab work on admission as  well as the CAT scan that was done of the abdomen and pelvis have all  been reviewed in the associated EPIC note on the date of service. She  had an x-ray done in  of the lumbar spine that noted no evidence  except of minor degenerative disk disease. The main reason for  admission was epigastric pain. REVIEW OF SYSTEMS:  Twelve-system review again positive for the  epigastric pain which has been resolving and she may have some issue  with gallstone. She is also complaining of lower back pain mostly on  the left side. PHYSICAL EXAMINATION:  VITAL SIGNS:  During the time of seeing her, blood pressure 147/52,  pulse 56, respiratory rate 18, and saturation is 97%.   GENERAL:  She is alert and oriented x3. No acute distress. She is  able to get off the bed on her own. BACK:  She has mostly back pain which is axial in nature. Positive  pain on palpation of the lumbar facet. She has no neurological sign  identified. HEART:  Regular rate and rhythm. LUNGS:  No wheezes. ABDOMEN:  Seemed to be soft on exam.    ASSESSMENT:  The patient is a 80-year-old female who appears to have  symptoms and sign of mostly lumbar axial spine pain. I will be  ordering x-ray of the lumbar spine. The patient was also advised to  come and follow up with me as an outpatient to discuss further care. I do not see any radiculopathy or any other neurological defects  suggesting MRI imaging. However, at certain point, MRI would be  necessary if the patient has continued to have pain and given the  history of her breast cancer as well. I do not see any _____  suggestive to any imaging based on the CAT scan, I feel no urgency. I  recommended physical therapy which can be done as an outpatient with  adjusting her on some anti-inflammatory muscle relaxant which will be  prescribed and given to the patient. The patient elected that she  wants to go home. However, we said that we are going to get the x-ray  result. I will be calling the patient up on receiving the results.       Ashu Condon MD    D: 10/02/2017 13:12:07       T: 10/02/2017 13:34:12     [email or web address]  Job#: [id number]     Doc#: [id number]

## 2017-10-03 ENCOUNTER — OFFICE VISIT (OUTPATIENT)
Dept: FAMILY MEDICINE CLINIC | Age: 62
End: 2017-10-03

## 2017-10-03 VITALS
RESPIRATION RATE: 14 BRPM | TEMPERATURE: 97.8 F | WEIGHT: 147 LBS | BODY MASS INDEX: 26.05 KG/M2 | DIASTOLIC BLOOD PRESSURE: 80 MMHG | SYSTOLIC BLOOD PRESSURE: 120 MMHG | HEART RATE: 74 BPM | HEIGHT: 63 IN

## 2017-10-03 DIAGNOSIS — I10 ESSENTIAL HYPERTENSION: ICD-10-CM

## 2017-10-03 DIAGNOSIS — K80.20 MULTIPLE GALLSTONES: ICD-10-CM

## 2017-10-03 DIAGNOSIS — R10.13 EPIGASTRIC ABDOMINAL PAIN: Primary | ICD-10-CM

## 2017-10-03 PROCEDURE — 99496 TRANSJ CARE MGMT HIGH F2F 7D: CPT | Performed by: FAMILY MEDICINE

## 2017-10-03 ASSESSMENT — ENCOUNTER SYMPTOMS
CHEST TIGHTNESS: 0
EYES NEGATIVE: 1
RESPIRATORY NEGATIVE: 1
GASTROINTESTINAL NEGATIVE: 1
COUGH: 0
RHINORRHEA: 0

## 2017-10-03 NOTE — PROGRESS NOTES
1. Epigastric abdominal pain     2. Multiple gallstones     3. Essential hypertension           Plan: Will stay on protonix    No orders of the defined types were placed in this encounter. No orders of the defined types were placed in this encounter.       Diagnostic Tests performed in hospital: reviewed  Requested/reviewed: Yes    Disease Education:  None      Home Health Care :  None      Discussed with other providers: general surgeon consult reviewed          Note:  CPT Coding - 28356 (Moderate level - seen within 14 days)      03522 (High level - seen within 7 days)  Needs to have associated phone contact within 2 business days of inpatient discharge    Electronically signed by Mirela Gamboa MD on 10/3/2017 at 7:22 PM

## 2017-10-13 ENCOUNTER — HOSPITAL ENCOUNTER (OUTPATIENT)
Dept: WOMENS IMAGING | Age: 62
Discharge: HOME | End: 2017-10-13
Payer: MEDICAID

## 2017-10-13 ENCOUNTER — HOSPITAL ENCOUNTER (OUTPATIENT)
Dept: ULTRASOUND IMAGING | Age: 62
End: 2017-10-13
Payer: MEDICAID

## 2017-10-13 DIAGNOSIS — Z85.3 HISTORY OF BREAST CANCER: ICD-10-CM

## 2017-10-13 PROCEDURE — G0279 TOMOSYNTHESIS, MAMMO: HCPCS

## 2017-12-29 RX ORDER — LEVOTHYROXINE SODIUM 0.05 MG/1
TABLET ORAL
Qty: 30 TABLET | Refills: 3 | Status: SHIPPED | OUTPATIENT
Start: 2017-12-29 | End: 2018-04-20 | Stop reason: SDUPTHER

## 2017-12-29 RX ORDER — PRAVASTATIN SODIUM 20 MG
TABLET ORAL
Qty: 90 TABLET | Refills: 1 | Status: SHIPPED | OUTPATIENT
Start: 2017-12-29 | End: 2018-07-13 | Stop reason: SDUPTHER

## 2018-01-11 ENCOUNTER — OFFICE VISIT (OUTPATIENT)
Dept: FAMILY MEDICINE CLINIC | Age: 63
End: 2018-01-11

## 2018-01-11 VITALS
OXYGEN SATURATION: 97 % | HEART RATE: 70 BPM | RESPIRATION RATE: 18 BRPM | HEIGHT: 63 IN | TEMPERATURE: 98.1 F | WEIGHT: 148 LBS | BODY MASS INDEX: 26.22 KG/M2 | DIASTOLIC BLOOD PRESSURE: 70 MMHG | SYSTOLIC BLOOD PRESSURE: 120 MMHG

## 2018-01-11 DIAGNOSIS — R10.11 RIGHT UPPER QUADRANT ABDOMINAL PAIN: Primary | ICD-10-CM

## 2018-01-11 DIAGNOSIS — S46.911A STRAIN OF RIGHT SHOULDER, INITIAL ENCOUNTER: ICD-10-CM

## 2018-01-11 PROCEDURE — 3017F COLORECTAL CA SCREEN DOC REV: CPT | Performed by: FAMILY MEDICINE

## 2018-01-11 PROCEDURE — G8419 CALC BMI OUT NRM PARAM NOF/U: HCPCS | Performed by: FAMILY MEDICINE

## 2018-01-11 PROCEDURE — G8484 FLU IMMUNIZE NO ADMIN: HCPCS | Performed by: FAMILY MEDICINE

## 2018-01-11 PROCEDURE — 3014F SCREEN MAMMO DOC REV: CPT | Performed by: FAMILY MEDICINE

## 2018-01-11 PROCEDURE — 1036F TOBACCO NON-USER: CPT | Performed by: FAMILY MEDICINE

## 2018-01-11 PROCEDURE — 99213 OFFICE O/P EST LOW 20 MIN: CPT | Performed by: FAMILY MEDICINE

## 2018-01-11 PROCEDURE — G8427 DOCREV CUR MEDS BY ELIG CLIN: HCPCS | Performed by: FAMILY MEDICINE

## 2018-01-11 RX ORDER — PREDNISONE 10 MG/1
TABLET ORAL
Qty: 40 TABLET | Refills: 0 | Status: SHIPPED | OUTPATIENT
Start: 2018-01-11 | End: 2018-02-01

## 2018-01-11 ASSESSMENT — ENCOUNTER SYMPTOMS
CHEST TIGHTNESS: 0
COUGH: 0
EYES NEGATIVE: 1
GASTROINTESTINAL NEGATIVE: 1
RHINORRHEA: 0
RESPIRATORY NEGATIVE: 1

## 2018-01-11 NOTE — PROGRESS NOTES
Patient is seen in follow up for   Chief Complaint   Patient presents with    Shoulder Injury     fell down a step and hurt her right arm/shoulder 12/28/17     [facility] . radiates to right arm . She also has right upper quadrant pain for the last year ct shoed stones.     Past Medical History:   Diagnosis Date    Anxiety     Arthritis     Asthma     Cancer ([address] 4/2015    Right breast cancer metastatic to axillary node    Chronic back pain     Depression     History of blood transfusion     Hyperlipidemia     Hypertension     Prolonged emergence from general anesthesia     SVT (supraventricular tachycardia) ([address]     Thyroid disease      Patient Active Problem List    Diagnosis Date Noted    Multiple gallstones 10/03/2017    Epigastric abdominal pain 09/30/2017    Lumbar spine painful on movement 09/30/2017    Degeneration, intervertebral disc, lumbar 09/30/2017    Malignant neoplasm of axillary 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 ([address] 09/14/2016    Hypothyroid 08/05/2014    Colon polyps 08/05/2014    SVT (supraventricular tachycardia) (HCC)     Hypertension     Hyperlipidemia     Asthma     Anxiety     Depression     Chronic back pain      Past Surgical History:   Procedure Laterality Date    BREAST BIOPSY Right 4/16/15    ULTRASOUND GUIDED BIOPSY RIGHT AXILLA, RIGHT AXILLARY DISSECTION,ULTRASOUND GUIDED RIGHT BREAST BIOPSY    COLONOSCOPY  9/12/14    polypectomy jarmoszuk    RI RMVL KELLY CTR VAD W/SUBQ PORT/ CTR/PRPH INSJ N/A 2/21/2017    PORT REMOVAL performed by Perry Connor MD at [facility]      tail bone    TONSILLECTOMY      TUBAL LIGATION      TUNNELED VENOUS PORT PLACEMENT Left 06/02/15    SUBCLAVIAN VEIN    WRIST GANGLION EXCISION Left      Family History   Problem Relation Age of Onset    Heart Disease Mother     Colon Cancer Father     Colon Polyps Father     Other Father 58     Post Op    Other Brother      Accidental Shooting    Brain Cancer Child      Social History     Social History    Marital status:      Spouse name: N/A    Number of children: N/A    Years of education: N/A     Social History Main Topics    Smoking status: Never Smoker    Smokeless tobacco: Never Used    Alcohol use Yes      Comment: rare    Drug use: No    Sexual activity: Not Asked     Other Topics Concern    None     Social History Narrative    None     Current Outpatient Prescriptions   Medication Sig Dispense Refill    predniSONE (DELTASONE) 10 MG tablet 4 po for 4 days  3 po for 4 days  2 po for 4 days  1 po for 4 days 40 tablet 0    pravastatin (PRAVACHOL) 20 MG tablet take 1 tablet by mouth every evening 90 tablet 1    levothyroxine (SYNTHROID) 50 MCG tablet take 1 tablet by mouth once daily 30 tablet 3    lidocaine (LIDODERM) 5 % Place 1 patch onto the skin daily 12 hours on, 12 hours off. 30 patch 3    pantoprazole (PROTONIX) 40 MG tablet Take 1 tablet by mouth daily 30 tablet 3    meloxicam (MOBIC) 7.5 MG tablet Take 1 tablet by mouth 2 times daily (with meals) 30 tablet 3    verapamil (CALAN SR) 180 MG extended release tablet take 1 tablet by mouth once daily 30 tablet 3    sertraline (ZOLOFT) 100 MG tablet Take 1 tablet by mouth daily 90 tablet 0     No current facility-administered medications for this visit. Current Outpatient Prescriptions on File Prior to Visit   Medication Sig Dispense Refill    pravastatin (PRAVACHOL) 20 MG tablet take 1 tablet by mouth every evening 90 tablet 1    levothyroxine (SYNTHROID) 50 MCG tablet take 1 tablet by mouth once daily 30 tablet 3    lidocaine (LIDODERM) 5 % Place 1 patch onto the skin daily 12 hours on, 12 hours off.  30 patch 3    pantoprazole (PROTONIX) 40 MG tablet Take 1 tablet by mouth daily 30 tablet 3    meloxicam (MOBIC) 7.5 MG tablet Take 1 tablet by mouth 2 times daily (with meals) 30 tablet 3    verapamil (CALAN SR) 180 MG extended release tablet take 1

## 2018-01-16 ENCOUNTER — HOSPITAL ENCOUNTER (OUTPATIENT)
Dept: ULTRASOUND IMAGING | Age: 63
Discharge: HOME | End: 2018-01-16
Payer: MEDICAID

## 2018-01-16 DIAGNOSIS — R10.11 RIGHT UPPER QUADRANT ABDOMINAL PAIN: ICD-10-CM

## 2018-01-16 PROCEDURE — 76705 ECHO EXAM OF ABDOMEN: CPT

## 2018-01-22 ENCOUNTER — TELEPHONE (OUTPATIENT)
Dept: FAMILY MEDICINE CLINIC | Age: 63
End: 2018-01-22

## 2018-01-27 ENCOUNTER — OFFICE VISIT (OUTPATIENT)
Dept: FAMILY MEDICINE CLINIC | Age: 63
End: 2018-01-27
Payer: MEDICAID

## 2018-01-27 VITALS
SYSTOLIC BLOOD PRESSURE: 126 MMHG | TEMPERATURE: 98.2 F | RESPIRATION RATE: 15 BRPM | WEIGHT: 149 LBS | HEART RATE: 72 BPM | BODY MASS INDEX: 25.44 KG/M2 | DIASTOLIC BLOOD PRESSURE: 82 MMHG | HEIGHT: 64 IN

## 2018-01-27 DIAGNOSIS — K81.9 CHOLECYSTITIS: Primary | ICD-10-CM

## 2018-01-27 PROCEDURE — 99213 OFFICE O/P EST LOW 20 MIN: CPT | Performed by: FAMILY MEDICINE

## 2018-01-27 PROCEDURE — G8428 CUR MEDS NOT DOCUMENT: HCPCS | Performed by: FAMILY MEDICINE

## 2018-01-27 PROCEDURE — 3017F COLORECTAL CA SCREEN DOC REV: CPT | Performed by: FAMILY MEDICINE

## 2018-01-27 PROCEDURE — G8484 FLU IMMUNIZE NO ADMIN: HCPCS | Performed by: FAMILY MEDICINE

## 2018-01-27 PROCEDURE — G8419 CALC BMI OUT NRM PARAM NOF/U: HCPCS | Performed by: FAMILY MEDICINE

## 2018-01-27 PROCEDURE — 3014F SCREEN MAMMO DOC REV: CPT | Performed by: FAMILY MEDICINE

## 2018-01-27 PROCEDURE — 1036F TOBACCO NON-USER: CPT | Performed by: FAMILY MEDICINE

## 2018-01-27 ASSESSMENT — ENCOUNTER SYMPTOMS
RESPIRATORY NEGATIVE: 1
NAUSEA: 1
ABDOMINAL PAIN: 1
CHEST TIGHTNESS: 0
COUGH: 0
RHINORRHEA: 0
EYES NEGATIVE: 1

## 2018-01-27 NOTE — PROGRESS NOTES
Patient is seen in follow up for   Chief Complaint   Patient presents with    Results     u/s    Shoulder Pain     follow-up     HPIHere for follow up on shoulder pain which has resolved.  U/s showed gall stones     Past Medical History:   Diagnosis Date    Anxiety     Arthritis     Asthma     Cancer ([address] 4/2015    Right breast cancer metastatic to axillary node    Chronic back pain     Depression     History of blood transfusion     Hyperlipidemia     Hypertension     Prolonged emergence from general anesthesia     SVT (supraventricular tachycardia) ([address]     Thyroid disease      Patient Active Problem List    Diagnosis Date Noted    Multiple gallstones 10/03/2017    Epigastric abdominal pain 09/30/2017    Lumbar spine painful on movement 09/30/2017    Degeneration, intervertebral disc, lumbar 09/30/2017    Malignant neoplasm of axillary 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 ([facility] [address] 09/14/2016    Hypothyroid 08/05/2014    Colon polyps 08/05/2014    SVT (supraventricular tachycardia) (HCC)     Hypertension     Hyperlipidemia     Asthma     Anxiety     Depression     Chronic back pain      Past Surgical History:   Procedure Laterality Date    BREAST BIOPSY Right 4/16/15    ULTRASOUND GUIDED BIOPSY RIGHT AXILLA, RIGHT AXILLARY DISSECTION,ULTRASOUND GUIDED RIGHT BREAST BIOPSY    COLONOSCOPY  9/12/14    polypectomy ger    [place] [facility] VAD W/SUBQ PORT/ CTR/PRPH INSJ N/A 2/21/2017    PORT REMOVAL performed by Ashley Sharpe MD at [address] [facility]      tail bone    TONSILLECTOMY      TUBAL LIGATION      TUNNELED VENOUS PORT PLACEMENT Left 06/02/15    SUBCLAVIAN VEIN    WRIST GANGLION EXCISION Left      Family History   Problem Relation Age of Onset    Heart Disease Mother     Colon Cancer Father     Colon Polyps Father     Other Father 58     Post Op    Other Brother      Accidental Shooting    Brain Cancer Child      Social History     Social History    Marital status: extended release tablet take 1 tablet by mouth once daily 30 tablet 3    sertraline (ZOLOFT) 100 MG tablet Take 1 tablet by mouth daily 90 tablet 0     No current facility-administered medications on file prior to visit. Allergies   Allergen Reactions    Latex Rash    Codeine     Tramadol Itching    Pcn [Penicillins] Rash    Tape [Adhesive Tape] Rash     Health Maintenance   Topic Date Due    Hepatitis C screen  1955    HIV screen  08/16/1970    Zostavax vaccine  10/03/2018 (Originally 8/16/2015)    DTaP/Tdap/Td vaccine (1 - Tdap) 10/03/2018 (Originally 8/16/1974)    Pneumococcal med risk (1 of 1 - PPSV23) 10/03/2018 (Originally 8/16/1974)    TSH testing  07/25/2018    Breast cancer screen  10/13/2018    Colon cancer screen colonoscopy  09/12/2019    Cervical cancer screen  09/14/2019    Lipid screen  03/19/2020    Flu vaccine  Completed       Review of Systems    Review of Systems   Constitutional: Negative for activity change, appetite change, fatigue and fever. HENT: Negative for congestion and rhinorrhea. Eyes: Negative. Respiratory: Negative. Negative for cough and chest tightness. Cardiovascular: Negative. Gastrointestinal: Positive for abdominal pain and nausea. Endocrine: Negative. Genitourinary: Negative. Musculoskeletal: Negative. Skin: Negative. Neurological: Negative for dizziness, light-headedness and numbness. Hematological: Negative. Psychiatric/Behavioral: Negative. Physical Exam  Vitals:    01/27/18 0928   BP: 126/82   Pulse: 72   Resp: 15   Temp: 98.2 °F (36.8 °C)   TempSrc: Tympanic   Weight: 149 lb (67.6 kg)   Height: 5' 3.5\" (1.613 m)       Physical Exam   Constitutional: She appears well-developed and well-nourished. HENT:   Right Ear: External ear normal.   Left Ear: External ear normal.   Eyes: Conjunctivae are normal. Pupils are equal, round, and reactive to light. Neck: Normal range of motion. Neck supple.  No thyromegaly present. Cardiovascular: Normal rate, regular rhythm and normal heart sounds. No murmur heard. Pulmonary/Chest: Effort normal and breath sounds normal.   Abdominal: Soft. Bowel sounds are normal. She exhibits no distension. There is tenderness. Musculoskeletal: Normal range of motion. She exhibits no edema or tenderness. Lymphadenopathy:     She has no cervical adenopathy. Neurological: She is alert. No cranial nerve deficit. Coordination normal.       Assessment  1. Cholecystitis  Librado Lamb MD     Problem List     None          Plan  Orders Placed This Encounter   Procedures   Librado Lamb MD     Referral Priority:   Routine     Referral Type:   Consult for Advice and Opinion     Referral Reason:   Specialty Services Required     Referred to Provider:   Barak Valencia MD     Requested Specialty:   General Surgery     Number of Visits Requested:   1     No orders of the defined types were placed in this encounter. No Follow-up on file.   Hazel Tineo MD

## 2018-02-01 ENCOUNTER — OFFICE VISIT (OUTPATIENT)
Dept: SURGERY | Age: 63
End: 2018-02-01
Payer: MEDICAID

## 2018-02-01 VITALS
TEMPERATURE: 97.4 F | DIASTOLIC BLOOD PRESSURE: 74 MMHG | HEIGHT: 64 IN | WEIGHT: 150 LBS | SYSTOLIC BLOOD PRESSURE: 116 MMHG | BODY MASS INDEX: 25.61 KG/M2

## 2018-02-01 DIAGNOSIS — R07.81 RIB PAIN ON RIGHT SIDE: ICD-10-CM

## 2018-02-01 DIAGNOSIS — K80.10 CHRONIC CHOLECYSTITIS WITH CALCULUS: Primary | ICD-10-CM

## 2018-02-01 PROCEDURE — 99212 OFFICE O/P EST SF 10 MIN: CPT | Performed by: SURGERY

## 2018-02-01 PROCEDURE — G8484 FLU IMMUNIZE NO ADMIN: HCPCS | Performed by: SURGERY

## 2018-02-01 PROCEDURE — 3014F SCREEN MAMMO DOC REV: CPT | Performed by: SURGERY

## 2018-02-01 PROCEDURE — G8419 CALC BMI OUT NRM PARAM NOF/U: HCPCS | Performed by: SURGERY

## 2018-02-01 PROCEDURE — 1036F TOBACCO NON-USER: CPT | Performed by: SURGERY

## 2018-02-01 PROCEDURE — G8427 DOCREV CUR MEDS BY ELIG CLIN: HCPCS | Performed by: SURGERY

## 2018-02-01 PROCEDURE — 3017F COLORECTAL CA SCREEN DOC REV: CPT | Performed by: SURGERY

## 2018-02-01 RX ORDER — OXYCODONE HYDROCHLORIDE AND ACETAMINOPHEN 5; 325 MG/1; MG/1
TABLET ORAL
Qty: 40 TABLET | Refills: 0 | Status: SHIPPED | OUTPATIENT
Start: 2018-02-01 | End: 2018-02-08

## 2018-02-01 ASSESSMENT — ENCOUNTER SYMPTOMS
RHINORRHEA: 0
CHEST TIGHTNESS: 0
NAUSEA: 0
COLOR CHANGE: 0
RECTAL PAIN: 0
ALLERGIC/IMMUNOLOGIC NEGATIVE: 1
ABDOMINAL PAIN: 1
BLOOD IN STOOL: 0
SHORTNESS OF BREATH: 0
ABDOMINAL DISTENTION: 0

## 2018-02-01 NOTE — PROGRESS NOTES
Subjective:      Patient ID: Mauro Gonzales is a 58 y.o. female. HPI  Mauro Gonzales is a 58 y.o. female who is being seen at the request of Dr Iftikhar Godwin for possible gallbladder disease. The symptoms include severe constant right upper quadrant pain and nausea. The patient denies juandice and/or dark urine. The symptoms were first noticed 6 months ago. The pain is rated as a 9 in intensity. The symptoms are increasing with time. She has a history of metastatic right breast cancer in 2015. The patient does not have a family history of gallbladder disease. Ultrasound on 1/16/2018 showed gallstones  CAT scan on 9/29/2018 shows a contracted gallbladder with cholelithiasis. She is not on any anticoagulants. Review of Systems   Constitutional: Negative for activity change, appetite change and unexpected weight change. HENT: Negative for congestion, nosebleeds, rhinorrhea and sneezing. Eyes: Negative for visual disturbance. Respiratory: Negative for chest tightness and shortness of breath. Cardiovascular: Positive for chest pain (right lateral chest wall). Negative for leg swelling. Gastrointestinal: Positive for abdominal pain. Negative for abdominal distention, blood in stool, nausea and rectal pain. Endocrine: Negative. Genitourinary: Negative for difficulty urinating. Musculoskeletal: Positive for arthralgias. Skin: Negative for color change. Allergic/Immunologic: Negative. Neurological: Negative for seizures, light-headedness, numbness and headaches. Hematological: Does not bruise/bleed easily. Psychiatric/Behavioral: Positive for sleep disturbance. Objective:   Physical Exam   Constitutional: She is oriented to person, place, and time. She appears well-developed and well-nourished. She appears distressed (she could not sit, she paced the room due to pain). HENT:   Mouth/Throat: Oropharynx is clear and moist.   Eyes: Pupils are equal, round, and reactive to light.  No

## 2018-02-05 ENCOUNTER — HOSPITAL ENCOUNTER (OUTPATIENT)
Dept: NUCLEAR MEDICINE | Age: 63
Discharge: HOME | End: 2018-02-07
Payer: MEDICAID

## 2018-02-05 ENCOUNTER — HOSPITAL ENCOUNTER (OUTPATIENT)
Dept: GENERAL RADIOLOGY | Age: 63
Discharge: HOME | End: 2018-02-07
Payer: MEDICAID

## 2018-02-05 DIAGNOSIS — K80.10 CHRONIC CHOLECYSTITIS WITH CALCULUS: ICD-10-CM

## 2018-02-05 PROCEDURE — 3430000000 HC RX DIAGNOSTIC RADIOPHARMACEUTICAL: Performed by: SURGERY

## 2018-02-05 PROCEDURE — 78306 BONE IMAGING WHOLE BODY: CPT

## 2018-02-05 PROCEDURE — A9503 TC99M MEDRONATE: HCPCS | Performed by: SURGERY

## 2018-02-05 PROCEDURE — 71101 X-RAY EXAM UNILAT RIBS/CHEST: CPT

## 2018-02-05 RX ORDER — TC 99M MEDRONATE 20 MG/10ML
25 INJECTION, POWDER, LYOPHILIZED, FOR SOLUTION INTRAVENOUS
Status: COMPLETED | OUTPATIENT
Start: 2018-02-05 | End: 2018-02-05

## 2018-02-05 RX ORDER — PANTOPRAZOLE SODIUM 40 MG/1
TABLET, DELAYED RELEASE ORAL
Qty: 30 TABLET | Refills: 3 | Status: SHIPPED | OUTPATIENT
Start: 2018-02-05 | End: 2018-07-13 | Stop reason: SDUPTHER

## 2018-02-05 RX ADMIN — Medication 20 MILLICURIE: at 10:15

## 2018-02-08 ENCOUNTER — OFFICE VISIT (OUTPATIENT)
Dept: SURGERY | Age: 63
End: 2018-02-08
Payer: MEDICAID

## 2018-02-08 VITALS
WEIGHT: 149 LBS | BODY MASS INDEX: 25.44 KG/M2 | HEIGHT: 64 IN | SYSTOLIC BLOOD PRESSURE: 130 MMHG | DIASTOLIC BLOOD PRESSURE: 78 MMHG | TEMPERATURE: 97.4 F

## 2018-02-08 DIAGNOSIS — R07.81 RIB PAIN ON RIGHT SIDE: ICD-10-CM

## 2018-02-08 DIAGNOSIS — K80.10 CHRONIC CHOLECYSTITIS WITH CALCULUS: Primary | ICD-10-CM

## 2018-02-08 PROCEDURE — 3014F SCREEN MAMMO DOC REV: CPT | Performed by: SURGERY

## 2018-02-08 PROCEDURE — 3017F COLORECTAL CA SCREEN DOC REV: CPT | Performed by: SURGERY

## 2018-02-08 PROCEDURE — G8484 FLU IMMUNIZE NO ADMIN: HCPCS | Performed by: SURGERY

## 2018-02-08 PROCEDURE — G8427 DOCREV CUR MEDS BY ELIG CLIN: HCPCS | Performed by: SURGERY

## 2018-02-08 PROCEDURE — 1036F TOBACCO NON-USER: CPT | Performed by: SURGERY

## 2018-02-08 PROCEDURE — G8419 CALC BMI OUT NRM PARAM NOF/U: HCPCS | Performed by: SURGERY

## 2018-02-08 PROCEDURE — 99213 OFFICE O/P EST LOW 20 MIN: CPT | Performed by: SURGERY

## 2018-02-08 ASSESSMENT — ENCOUNTER SYMPTOMS
RECTAL PAIN: 0
BLOOD IN STOOL: 0
ABDOMINAL PAIN: 1
ABDOMINAL DISTENTION: 0
SHORTNESS OF BREATH: 0
RHINORRHEA: 0
COLOR CHANGE: 0
NAUSEA: 0
ALLERGIC/IMMUNOLOGIC NEGATIVE: 1
CHEST TIGHTNESS: 0

## 2018-02-16 ENCOUNTER — HOSPITAL ENCOUNTER (OUTPATIENT)
Dept: NUCLEAR MEDICINE | Age: 63
Discharge: HOME | End: 2018-02-18
Payer: MEDICAID

## 2018-02-16 ENCOUNTER — APPOINTMENT (OUTPATIENT)
Dept: CT IMAGING | Age: 63
End: 2018-02-16
Payer: MEDICAID

## 2018-02-16 DIAGNOSIS — K80.10 CHRONIC CHOLECYSTITIS WITH CALCULUS: ICD-10-CM

## 2018-02-16 DIAGNOSIS — R07.81 RIB PAIN ON RIGHT SIDE: ICD-10-CM

## 2018-02-16 PROCEDURE — A9537 TC99M MEBROFENIN: HCPCS | Performed by: SURGERY

## 2018-02-16 PROCEDURE — 78227 HEPATOBIL SYST IMAGE W/DRUG: CPT

## 2018-02-16 PROCEDURE — 3430000000 HC RX DIAGNOSTIC RADIOPHARMACEUTICAL: Performed by: SURGERY

## 2018-02-16 RX ADMIN — Medication 7.1 MILLICURIE: at 10:25

## 2018-02-23 ENCOUNTER — HOSPITAL ENCOUNTER (OUTPATIENT)
Dept: CT IMAGING | Age: 63
Discharge: HOME | End: 2018-02-25
Payer: MEDICAID

## 2018-02-23 VITALS
HEIGHT: 64 IN | SYSTOLIC BLOOD PRESSURE: 134 MMHG | DIASTOLIC BLOOD PRESSURE: 78 MMHG | RESPIRATION RATE: 16 BRPM | WEIGHT: 148 LBS | HEART RATE: 71 BPM | BODY MASS INDEX: 25.27 KG/M2

## 2018-02-23 DIAGNOSIS — R07.81 RIB PAIN ON RIGHT SIDE: ICD-10-CM

## 2018-02-23 LAB
GFR AFRICAN AMERICAN: >60
GFR NON-AFRICAN AMERICAN: >60
PERFORMED ON: NORMAL
POC CREATININE: 0.8 MG/DL (ref 0.6–1.2)
POC SAMPLE TYPE: NORMAL

## 2018-02-23 PROCEDURE — 6360000004 HC RX CONTRAST MEDICATION: Performed by: FAMILY MEDICINE

## 2018-02-23 PROCEDURE — 71260 CT THORAX DX C+: CPT

## 2018-02-23 RX ORDER — SODIUM CHLORIDE 0.9 % (FLUSH) 0.9 %
10 SYRINGE (ML) INJECTION PRN
Status: DISCONTINUED | OUTPATIENT
Start: 2018-02-23 | End: 2018-02-26 | Stop reason: HOSPADM

## 2018-02-23 RX ADMIN — IOPAMIDOL 75 ML: 755 INJECTION, SOLUTION INTRAVENOUS at 14:50

## 2018-02-23 ASSESSMENT — PAIN DESCRIPTION - DESCRIPTORS: DESCRIPTORS: STABBING

## 2018-02-23 ASSESSMENT — PAIN - FUNCTIONAL ASSESSMENT: PAIN_FUNCTIONAL_ASSESSMENT: 0-10

## 2018-02-27 RX ORDER — SERTRALINE HYDROCHLORIDE 100 MG/1
TABLET, FILM COATED ORAL
Qty: 90 TABLET | Refills: 0 | Status: SHIPPED | OUTPATIENT
Start: 2018-02-27 | End: 2018-09-25 | Stop reason: SDUPTHER

## 2018-03-02 ENCOUNTER — PREP FOR PROCEDURE (OUTPATIENT)
Dept: SURGERY | Age: 63
End: 2018-03-02

## 2018-03-02 ENCOUNTER — OFFICE VISIT (OUTPATIENT)
Dept: SURGERY | Age: 63
End: 2018-03-02
Payer: MEDICAID

## 2018-03-02 VITALS
OXYGEN SATURATION: 98 % | SYSTOLIC BLOOD PRESSURE: 139 MMHG | DIASTOLIC BLOOD PRESSURE: 84 MMHG | BODY MASS INDEX: 25.98 KG/M2 | WEIGHT: 149 LBS | HEART RATE: 73 BPM | TEMPERATURE: 97.5 F

## 2018-03-02 DIAGNOSIS — K80.10 CHRONIC CHOLECYSTITIS WITH CALCULUS: Primary | ICD-10-CM

## 2018-03-02 PROCEDURE — 3014F SCREEN MAMMO DOC REV: CPT | Performed by: SURGERY

## 2018-03-02 PROCEDURE — G8484 FLU IMMUNIZE NO ADMIN: HCPCS | Performed by: SURGERY

## 2018-03-02 PROCEDURE — G8419 CALC BMI OUT NRM PARAM NOF/U: HCPCS | Performed by: SURGERY

## 2018-03-02 PROCEDURE — 99214 OFFICE O/P EST MOD 30 MIN: CPT | Performed by: SURGERY

## 2018-03-02 PROCEDURE — G8428 CUR MEDS NOT DOCUMENT: HCPCS | Performed by: SURGERY

## 2018-03-02 PROCEDURE — 3017F COLORECTAL CA SCREEN DOC REV: CPT | Performed by: SURGERY

## 2018-03-02 PROCEDURE — 1036F TOBACCO NON-USER: CPT | Performed by: SURGERY

## 2018-03-02 ASSESSMENT — ENCOUNTER SYMPTOMS
SHORTNESS OF BREATH: 0
RECTAL PAIN: 0
ABDOMINAL PAIN: 1
RHINORRHEA: 0
COLOR CHANGE: 0
BLOOD IN STOOL: 0
NAUSEA: 0
ABDOMINAL DISTENTION: 0
ALLERGIC/IMMUNOLOGIC NEGATIVE: 1
CHEST TIGHTNESS: 0

## 2018-03-02 NOTE — PROGRESS NOTES
(SYNTHROID) 50 MCG tablet take 1 tablet by mouth once daily 30 tablet 3     No current facility-administered medications for this visit. /84 (Site: Left Arm, Position: Sitting, Cuff Size: Medium Adult)   Pulse 73   Temp 97.5 °F (36.4 °C) (Temporal)   Wt 149 lb (67.6 kg)   LMP  (LMP Unknown) Comment: last menses at age 48  SpO2 98%   BMI 25.98 kg/m²     Objective:   Physical Exam   Constitutional: She is oriented to person, place, and time. She appears well-developed and well-nourished. No distress. HENT:   Mouth/Throat: Oropharynx is clear and moist.   Eyes: Pupils are equal, round, and reactive to light. Neck: No tracheal deviation present. No thyromegaly present. Cardiovascular: Normal rate and normal heart sounds. Pulmonary/Chest: Effort normal and breath sounds normal. No respiratory distress. Abdominal: There is no hepatosplenomegaly. There is no tenderness. Hernia confirmed negative in the ventral area. Musculoskeletal:   Normal gait   Neurological: She is alert and oriented to person, place, and time. Skin: No bruising, no lesion and no rash noted. Psychiatric: She has a normal mood and affect. Judgment normal.     /84 (Site: Left Arm, Position: Sitting, Cuff Size: Medium Adult)   Pulse 73   Temp 97.5 °F (36.4 °C) (Temporal)   Wt 149 lb (67.6 kg)   LMP  (LMP Unknown) Comment: last menses at age 48  SpO2 98%   BMI 25.98 kg/m²   Assessment:     Right rib pain   chronic cholecystitis with cholelithiasis    Plan:      Cholecystectomy with Cholangiogram    The patient was given the options of therapy. The procedure of laparascopic as well as the open procedure were discussed. I explained that a cholangiogram with dye would be attempted but not always done. The risks and complications including but not exclusive to include infection, blood loss, bile ductal damage, bile leakage, retained stones and non resolution of pain .  If any of these were to occur it may

## 2018-03-03 ENCOUNTER — OFFICE VISIT (OUTPATIENT)
Dept: FAMILY MEDICINE CLINIC | Age: 63
End: 2018-03-03
Payer: MEDICAID

## 2018-03-03 VITALS
TEMPERATURE: 97.9 F | DIASTOLIC BLOOD PRESSURE: 68 MMHG | RESPIRATION RATE: 16 BRPM | OXYGEN SATURATION: 97 % | WEIGHT: 150 LBS | BODY MASS INDEX: 25.61 KG/M2 | SYSTOLIC BLOOD PRESSURE: 136 MMHG | HEIGHT: 64 IN | HEART RATE: 64 BPM

## 2018-03-03 DIAGNOSIS — E78.5 HYPERLIPIDEMIA, UNSPECIFIED HYPERLIPIDEMIA TYPE: ICD-10-CM

## 2018-03-03 DIAGNOSIS — J40 BRONCHITIS: Primary | ICD-10-CM

## 2018-03-03 DIAGNOSIS — E03.9 HYPOTHYROIDISM, UNSPECIFIED TYPE: ICD-10-CM

## 2018-03-03 DIAGNOSIS — I10 ESSENTIAL HYPERTENSION: ICD-10-CM

## 2018-03-03 DIAGNOSIS — C50.611 MALIGNANT NEOPLASM OF AXILLARY TAIL OF RIGHT FEMALE BREAST, UNSPECIFIED ESTROGEN RECEPTOR STATUS (HCC): ICD-10-CM

## 2018-03-03 DIAGNOSIS — R07.89 CHEST TIGHTNESS OR PRESSURE: ICD-10-CM

## 2018-03-03 PROCEDURE — 3014F SCREEN MAMMO DOC REV: CPT | Performed by: FAMILY MEDICINE

## 2018-03-03 PROCEDURE — G8427 DOCREV CUR MEDS BY ELIG CLIN: HCPCS | Performed by: FAMILY MEDICINE

## 2018-03-03 PROCEDURE — 99213 OFFICE O/P EST LOW 20 MIN: CPT | Performed by: FAMILY MEDICINE

## 2018-03-03 PROCEDURE — 3017F COLORECTAL CA SCREEN DOC REV: CPT | Performed by: FAMILY MEDICINE

## 2018-03-03 PROCEDURE — 1036F TOBACCO NON-USER: CPT | Performed by: FAMILY MEDICINE

## 2018-03-03 PROCEDURE — G8484 FLU IMMUNIZE NO ADMIN: HCPCS | Performed by: FAMILY MEDICINE

## 2018-03-03 PROCEDURE — G8419 CALC BMI OUT NRM PARAM NOF/U: HCPCS | Performed by: FAMILY MEDICINE

## 2018-03-03 PROCEDURE — 93000 ELECTROCARDIOGRAM COMPLETE: CPT | Performed by: FAMILY MEDICINE

## 2018-03-03 RX ORDER — ALBUTEROL SULFATE 90 UG/1
2 AEROSOL, METERED RESPIRATORY (INHALATION) EVERY 6 HOURS PRN
Qty: 1 INHALER | Refills: 3 | Status: SHIPPED | OUTPATIENT
Start: 2018-03-03 | End: 2018-11-05 | Stop reason: SDUPTHER

## 2018-03-03 RX ORDER — LEVOFLOXACIN 500 MG/1
500 TABLET, FILM COATED ORAL DAILY
Qty: 10 TABLET | Refills: 0 | Status: SHIPPED | OUTPATIENT
Start: 2018-03-03 | End: 2018-03-13

## 2018-03-03 NOTE — PROGRESS NOTES
Subjective  Gary Roper, 58 y.o. female presents today with:  Chief Complaint   Patient presents with    Cough    Chest Pain       ekg WNL except mild magen  Cp w cough only  Mucous qhs      Past Medical History:   Diagnosis Date    Anxiety     Arthritis     Asthma     Cancer ([facility] [address] 4/2015    Right breast cancer metastatic to axillary node    Chronic back pain     Depression     History of blood transfusion     Hyperlipidemia     Hypertension     Prolonged emergence from general anesthesia     SVT (supraventricular tachycardia) ([address]     Thyroid disease      Past Surgical History:   Procedure Laterality Date    BREAST BIOPSY Right 4/16/15    ULTRASOUND GUIDED BIOPSY RIGHT AXILLA, RIGHT AXILLARY DISSECTION,ULTRASOUND GUIDED RIGHT BREAST BIOPSY    COLONOSCOPY  9/12/14    polypectomy jarmoszuk    [place] [facility] VAD W/SUBQ PORT/ CTR/PRPH INSJ N/A 2/21/2017    PORT REMOVAL performed by Ana Cespedes MD at [address] [place] [place]      tail bone    TONSILLECTOMY      TUBAL LIGATION      TUNNELED VENOUS PORT PLACEMENT Left 06/02/15    SUBCLAVIAN VEIN    WRIST GANGLION EXCISION Left      Social History     Social History    Marital status:      Spouse name: N/A    Number of children: N/A    Years of education: N/A     Occupational History    Not on file.      Social History Main Topics    Smoking status: Never Smoker    Smokeless tobacco: Never Used    Alcohol use Yes      Comment: rare    Drug use: No    Sexual activity: Not on file     Other Topics Concern    Not on file     Social History Narrative    No narrative on file     Family History   Problem Relation Age of Onset    Heart Disease Mother     Colon Cancer Father     Colon Polyps Father     Other Father 58     Post Op    Other Brother      Accidental Shooting    Brain Cancer Child      Allergies   Allergen Reactions    Latex Rash    Codeine     Tramadol Itching    Pcn [Penicillins] Rash    Tape Yvonne Davis Tape] Rash     Current Outpatient Prescriptions   Medication Sig Dispense Refill    sertraline (ZOLOFT) 100 MG tablet take 1 tablet by mouth once daily 90 tablet 0    verapamil (CALAN SR) 180 MG extended release tablet take 1 tablet by mouth once daily 30 tablet 3    pantoprazole (PROTONIX) 40 MG tablet take 1 tablet by mouth once daily 30 tablet 3    VENTOLIN  (90 Base) MCG/ACT inhaler   0    pravastatin (PRAVACHOL) 20 MG tablet take 1 tablet by mouth every evening 90 tablet 1    levothyroxine (SYNTHROID) 50 MCG tablet take 1 tablet by mouth once daily 30 tablet 3     No current facility-administered medications for this visit. The patient denies any history of      seizures,             heart attack or KNOWN CAD        or stroke. + chest pain, NO shortness of breath, paroxysmal nocturnal dyspnea. No nausea, vomiting, diarrhea, hematochezia or melena. No paresthesias or headaches. No dysuria, frequency or hematuria. Last labs  Hospital Outpatient Visit on 02/23/2018   Component Date Value Ref Range Status    POC Creatinine 02/23/2018 0.8  0.6 - 1.2 mg/dL Final    GFR Non- 02/23/2018 >60  >60 Final    Comment: >60 mL/min/1.73m2 EGFR, calc. for ages 25 and older using the  MDRD formula (not corrected for weight), is valid for stable  renal function.  GFR  02/23/2018 >60  >60 Final    Comment: >60 mL/min/1.73m2 EGFR, calc. for ages 25 and older using the  MDRD formula (not corrected for weight), is valid for stable  renal function.       Sample Type 02/23/2018 NICKI   Final    Performed on 02/23/2018 SEE BELOW   Final     Health Maintenance   Topic Date Due    Hepatitis C screen  1955    HIV screen  08/16/1970    Shingles Vaccine (1 of 2 - 2 Dose Series) 09/03/2018 (Originally 8/16/2005)    DTaP/Tdap/Td vaccine (1 - Tdap) 10/03/2018 (Originally 8/16/1974)    Pneumococcal med risk (1 of 1 - PPSV23) 10/03/2018 (Originally 8/16/1974) defined types were placed in this encounter. There are no discontinued medications. No Follow-up on file. Add levaquin x10d  The patient was reminded that an antibiotic has been prescribed the predicted course is improvement to cure with no persistent issues. Take antibiotics as directed. If any problems occur, an appointment should be made or ER visit if severe. Because of the risk with ANY antibiotic of C. Difficile colitis if persistent diarrhea or abdominal pain or any concerning symptoms, we should be notified. To reduce this risk, a probiotic pill, yogurt or other preparations containing active cultures should be ingested daily -particularly while on the antibiotic. If any persistent symptoms of illness, follow up appointment should be made in a timely fashion with a physician.   Likely NOT cardiac BUT if heaviness persists despite inhaler will need er-declines for now even though advised if may be safest choice  She wants to try abtc and inhaler first she reports \"FEELS Monica Hindu"  Terrance Dickerson MD

## 2018-03-20 ENCOUNTER — OFFICE VISIT (OUTPATIENT)
Dept: INTERNAL MEDICINE CLINIC | Age: 63
End: 2018-03-20
Payer: MEDICAID

## 2018-03-20 VITALS
HEART RATE: 72 BPM | HEIGHT: 63 IN | SYSTOLIC BLOOD PRESSURE: 136 MMHG | BODY MASS INDEX: 26.75 KG/M2 | DIASTOLIC BLOOD PRESSURE: 82 MMHG | WEIGHT: 151 LBS | TEMPERATURE: 97.8 F | RESPIRATION RATE: 16 BRPM | OXYGEN SATURATION: 97 %

## 2018-03-20 DIAGNOSIS — Z11.59 NEED FOR HEPATITIS C SCREENING TEST: ICD-10-CM

## 2018-03-20 DIAGNOSIS — K81.9 CHOLECYSTITIS: Primary | ICD-10-CM

## 2018-03-20 DIAGNOSIS — I10 ESSENTIAL HYPERTENSION: ICD-10-CM

## 2018-03-20 DIAGNOSIS — Z01.818 PREOP EXAMINATION: ICD-10-CM

## 2018-03-20 DIAGNOSIS — Z11.4 SCREENING FOR HIV (HUMAN IMMUNODEFICIENCY VIRUS): ICD-10-CM

## 2018-03-20 PROCEDURE — 1036F TOBACCO NON-USER: CPT | Performed by: FAMILY MEDICINE

## 2018-03-20 PROCEDURE — G8427 DOCREV CUR MEDS BY ELIG CLIN: HCPCS | Performed by: FAMILY MEDICINE

## 2018-03-20 PROCEDURE — G8482 FLU IMMUNIZE ORDER/ADMIN: HCPCS | Performed by: FAMILY MEDICINE

## 2018-03-20 PROCEDURE — 3014F SCREEN MAMMO DOC REV: CPT | Performed by: FAMILY MEDICINE

## 2018-03-20 PROCEDURE — 99213 OFFICE O/P EST LOW 20 MIN: CPT | Performed by: FAMILY MEDICINE

## 2018-03-20 PROCEDURE — G8419 CALC BMI OUT NRM PARAM NOF/U: HCPCS | Performed by: FAMILY MEDICINE

## 2018-03-20 PROCEDURE — 3017F COLORECTAL CA SCREEN DOC REV: CPT | Performed by: FAMILY MEDICINE

## 2018-03-20 ASSESSMENT — PATIENT HEALTH QUESTIONNAIRE - PHQ9
SUM OF ALL RESPONSES TO PHQ9 QUESTIONS 1 & 2: 0
1. LITTLE INTEREST OR PLEASURE IN DOING THINGS: 0
2. FEELING DOWN, DEPRESSED OR HOPELESS: 0
SUM OF ALL RESPONSES TO PHQ QUESTIONS 1-9: 0

## 2018-03-20 ASSESSMENT — ENCOUNTER SYMPTOMS
CHEST TIGHTNESS: 0
EYES NEGATIVE: 1
RESPIRATORY NEGATIVE: 1
GASTROINTESTINAL NEGATIVE: 1
RHINORRHEA: 0
COUGH: 0

## 2018-03-20 NOTE — PROGRESS NOTES
Patient is seen in follow up for   Chief Complaint   Patient presents with    Pre-op Exam     Patient needs to be cleared for surgery, she had to cancel her surgery to remove her gallblader per Dr. Alyssa Flanagan. She was dx with bronchitis on 3/3/18- finished medication.  Health Maintenance     Will have HIV and Hep C Screenings. HPIhere for follow up on bronchitis feeling well. She is needing her gall bladder removed.     Past Medical History:   Diagnosis Date    Anxiety     Arthritis     Asthma     Cancer ([address] 4/2015    Right breast cancer metastatic to axillary node    Chronic back pain     Depression     History of blood transfusion     Hyperlipidemia     Hypertension     Prolonged emergence from general anesthesia     SVT (supraventricular tachycardia) ([address]     Thyroid disease      Patient Active Problem List    Diagnosis Date Noted    Chronic cholecystitis with calculus 02/01/2018    Rib pain on right side 02/01/2018    Multiple gallstones 10/03/2017    Epigastric abdominal pain 09/30/2017    Lumbar spine painful on movement 09/30/2017    Degeneration, intervertebral disc, lumbar 09/30/2017    Malignant neoplasm of axillary 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 ([address] 09/14/2016    Hypothyroid 08/05/2014    Colon polyps 08/05/2014    SVT (supraventricular tachycardia) (HCC)     Hypertension     Hyperlipidemia     Asthma     Anxiety     Depression     Chronic back pain      Past Surgical History:   Procedure Laterality Date    BREAST BIOPSY Right 4/16/15    ULTRASOUND GUIDED BIOPSY RIGHT AXILLA, RIGHT AXILLARY DISSECTION,ULTRASOUND GUIDED RIGHT BREAST BIOPSY    COLONOSCOPY  9/12/14    polypectomy ger    [place] [facility] VAD W/SUBQ PORT/ CTR/PRPH INSJ N/A 2/21/2017    PORT REMOVAL performed by Jose Stevens MD at [address] [facility] bone    TONSILLECTOMY      TUBAL LIGATION      TUNNELED VENOUS PORT PLACEMENT Left 06/02/15    SUBCLAVIAN VEIN    WRIST GANGLION EXCISION Left      Family History   Problem Relation Age of Onset    Heart Disease Mother     Colon Cancer Father     Colon Polyps Father     Other Father [address] [facility]     Post Op    Other Brother      Accidental Shooting    Brain Cancer Child      Social History     Social History    Marital status:      Spouse name: N/A    Number of children: N/A    Years of education: N/A     Social History Main Topics    Smoking status: Never Smoker    Smokeless tobacco: Never Used    Alcohol use Yes      Comment: rare    Drug use: No    Sexual activity: Not Asked     Other Topics Concern    None     Social History Narrative    None     Current Outpatient Prescriptions   Medication Sig Dispense Refill    albuterol sulfate HFA (PROVENTIL HFA) 108 (90 Base) MCG/ACT inhaler Inhale 2 puffs into the lungs every 6 hours as needed for Wheezing 1 Inhaler 3    sertraline (ZOLOFT) 100 MG tablet take 1 tablet by mouth once daily 90 tablet 0    verapamil (CALAN SR) 180 MG extended release tablet take 1 tablet by mouth once daily 30 tablet 3    pantoprazole (PROTONIX) 40 MG tablet take 1 tablet by mouth once daily 30 tablet 3    pravastatin (PRAVACHOL) 20 MG tablet take 1 tablet by mouth every evening 90 tablet 1    levothyroxine (SYNTHROID) 50 MCG tablet take 1 tablet by mouth once daily 30 tablet 3     No current facility-administered medications for this visit.       Current Outpatient Prescriptions on File Prior to Visit   Medication Sig Dispense Refill    albuterol sulfate HFA (PROVENTIL HFA) 108 (90 Base) MCG/ACT inhaler Inhale 2 puffs into the lungs every 6 hours as needed for Wheezing 1 Inhaler 3    sertraline (ZOLOFT) 100 MG tablet take 1 tablet by mouth once daily 90 tablet 0    verapamil (CALAN SR) 180 MG extended release tablet take 1 tablet by mouth once daily 30 tablet 3    pantoprazole (PROTONIX) 40 MG tablet take 1 tablet by mouth once daily 30 tablet 3    pravastatin (PRAVACHOL) 20 MG tablet take 1 tablet by mouth every evening 90 tablet 1    levothyroxine (SYNTHROID) 50 MCG tablet take 1 tablet by mouth once daily 30 tablet 3     No current facility-administered medications on file prior to visit. Allergies   Allergen Reactions    Latex Rash    Codeine     Tramadol Itching    Pcn [Penicillins] Rash    Tape [Adhesive Tape] Rash     Health Maintenance   Topic Date Due    Hepatitis C screen  1955    HIV screen  08/16/1970    Shingles Vaccine (1 of 2 - 2 Dose Series) 09/03/2018 (Originally 8/16/2005)    DTaP/Tdap/Td vaccine (1 - Tdap) 10/03/2018 (Originally 8/16/1974)    Pneumococcal med risk (1 of 1 - PPSV23) 10/03/2018 (Originally 8/16/1974)    TSH testing  07/25/2018    Breast cancer screen  10/13/2018    Colon cancer screen colonoscopy  09/12/2019    Cervical cancer screen  09/14/2019    Lipid screen  03/19/2020    Flu vaccine  Completed       Review of Systems    Review of Systems   Constitutional: Negative for activity change, appetite change, fatigue and fever. HENT: Negative for congestion and rhinorrhea. Eyes: Negative. Respiratory: Negative. Negative for cough and chest tightness. Cardiovascular: Negative. Gastrointestinal: Negative. Endocrine: Negative. Genitourinary: Negative. Musculoskeletal: Negative. Skin: Negative. Neurological: Negative for dizziness, light-headedness and numbness. Hematological: Negative. Psychiatric/Behavioral: Negative. Physical Exam  Vitals:    03/20/18 1651   BP: 136/82   Site: Right Arm   Position: Sitting   Cuff Size: Small Adult   Pulse: 72   Resp: 16   Temp: 97.8 °F (36.6 °C)   TempSrc: Oral   SpO2: 97%   Weight: 151 lb (68.5 kg)   Height: 5' 3\" (1.6 m)       Physical Exam   Constitutional: She appears well-developed and well-nourished. HENT:   Right Ear: External ear normal.   Left Ear: External ear normal.   Eyes: Conjunctivae are normal. Pupils are equal, round, and reactive to light.    Neck:

## 2018-04-06 ENCOUNTER — HOSPITAL ENCOUNTER (OUTPATIENT)
Dept: PREADMISSION TESTING | Age: 63
Discharge: HOME | End: 2018-04-10
Payer: MEDICAID

## 2018-04-06 ENCOUNTER — OFFICE VISIT (OUTPATIENT)
Dept: SURGERY | Age: 63
End: 2018-04-06
Payer: MEDICAID

## 2018-04-06 VITALS
WEIGHT: 152 LBS | HEIGHT: 64 IN | SYSTOLIC BLOOD PRESSURE: 124 MMHG | TEMPERATURE: 97.8 F | DIASTOLIC BLOOD PRESSURE: 82 MMHG | BODY MASS INDEX: 25.95 KG/M2

## 2018-04-06 VITALS
OXYGEN SATURATION: 95 % | RESPIRATION RATE: 16 BRPM | DIASTOLIC BLOOD PRESSURE: 77 MMHG | HEART RATE: 70 BPM | WEIGHT: 152.2 LBS | SYSTOLIC BLOOD PRESSURE: 145 MMHG | BODY MASS INDEX: 25.99 KG/M2 | HEIGHT: 64 IN | TEMPERATURE: 98.2 F

## 2018-04-06 DIAGNOSIS — K80.10 CHRONIC CHOLECYSTITIS WITH CALCULUS: Primary | ICD-10-CM

## 2018-04-06 PROBLEM — K80.20 MULTIPLE GALLSTONES: Chronic | Status: ACTIVE | Noted: 2017-10-03

## 2018-04-06 LAB
ALBUMIN SERPL-MCNC: 4.4 G/DL (ref 3.9–4.9)
ALP BLD-CCNC: 91 U/L (ref 40–130)
ALT SERPL-CCNC: 16 U/L (ref 0–33)
ANION GAP SERPL CALCULATED.3IONS-SCNC: 13 MEQ/L (ref 7–13)
AST SERPL-CCNC: 19 U/L (ref 0–35)
BILIRUB SERPL-MCNC: 0.4 MG/DL (ref 0–1.2)
BILIRUBIN DIRECT: 0.2 MG/DL (ref 0–0.3)
BILIRUBIN, INDIRECT: 0.2 MG/DL (ref 0–0.6)
BUN BLDV-MCNC: 17 MG/DL (ref 8–23)
CALCIUM SERPL-MCNC: 9.3 MG/DL (ref 8.6–10.2)
CHLORIDE BLD-SCNC: 100 MEQ/L (ref 98–107)
CO2: 26 MEQ/L (ref 22–29)
CREAT SERPL-MCNC: 0.48 MG/DL (ref 0.5–0.9)
GFR AFRICAN AMERICAN: >60
GFR NON-AFRICAN AMERICAN: >60
GLUCOSE BLD-MCNC: 88 MG/DL (ref 74–109)
HCT VFR BLD CALC: 41.2 % (ref 37–47)
HEMOGLOBIN: 13.9 G/DL (ref 12–16)
MCH RBC QN AUTO: 30.7 PG (ref 27–31.3)
MCHC RBC AUTO-ENTMCNC: 33.7 % (ref 33–37)
MCV RBC AUTO: 91.2 FL (ref 82–100)
PDW BLD-RTO: 12.6 % (ref 11.5–14.5)
PLATELET # BLD: 221 K/UL (ref 130–400)
POTASSIUM SERPL-SCNC: 3.9 MEQ/L (ref 3.5–5.1)
RBC # BLD: 4.52 M/UL (ref 4.2–5.4)
SODIUM BLD-SCNC: 139 MEQ/L (ref 132–144)
TOTAL PROTEIN: 6.8 G/DL (ref 6.4–8.1)
WBC # BLD: 4.7 K/UL (ref 4.8–10.8)

## 2018-04-06 PROCEDURE — G8427 DOCREV CUR MEDS BY ELIG CLIN: HCPCS | Performed by: SURGERY

## 2018-04-06 PROCEDURE — 85027 COMPLETE CBC AUTOMATED: CPT

## 2018-04-06 PROCEDURE — G8419 CALC BMI OUT NRM PARAM NOF/U: HCPCS | Performed by: SURGERY

## 2018-04-06 PROCEDURE — 80076 HEPATIC FUNCTION PANEL: CPT

## 2018-04-06 PROCEDURE — 80048 BASIC METABOLIC PNL TOTAL CA: CPT

## 2018-04-06 PROCEDURE — 3014F SCREEN MAMMO DOC REV: CPT | Performed by: SURGERY

## 2018-04-06 PROCEDURE — 1036F TOBACCO NON-USER: CPT | Performed by: SURGERY

## 2018-04-06 PROCEDURE — 3017F COLORECTAL CA SCREEN DOC REV: CPT | Performed by: SURGERY

## 2018-04-06 PROCEDURE — 99213 OFFICE O/P EST LOW 20 MIN: CPT | Performed by: SURGERY

## 2018-04-06 RX ORDER — LIDOCAINE HYDROCHLORIDE 10 MG/ML
1 INJECTION, SOLUTION EPIDURAL; INFILTRATION; INTRACAUDAL; PERINEURAL
Status: CANCELLED | OUTPATIENT
Start: 2018-04-06 | End: 2018-04-06

## 2018-04-06 RX ORDER — SODIUM CHLORIDE 0.9 % (FLUSH) 0.9 %
10 SYRINGE (ML) INJECTION PRN
Status: CANCELLED | OUTPATIENT
Start: 2018-04-06

## 2018-04-06 RX ORDER — SODIUM CHLORIDE, SODIUM LACTATE, POTASSIUM CHLORIDE, CALCIUM CHLORIDE 600; 310; 30; 20 MG/100ML; MG/100ML; MG/100ML; MG/100ML
INJECTION, SOLUTION INTRAVENOUS CONTINUOUS
Status: CANCELLED | OUTPATIENT
Start: 2018-04-06

## 2018-04-06 RX ORDER — IBUPROFEN 200 MG
200 TABLET ORAL EVERY 6 HOURS PRN
COMMUNITY
End: 2018-07-04

## 2018-04-06 RX ORDER — KETOROLAC TROMETHAMINE 30 MG/ML
30 INJECTION, SOLUTION INTRAMUSCULAR; INTRAVENOUS ONCE
Status: CANCELLED | OUTPATIENT
Start: 2018-04-13 | End: 2018-04-13

## 2018-04-06 RX ORDER — SODIUM CHLORIDE 0.9 % (FLUSH) 0.9 %
10 SYRINGE (ML) INJECTION EVERY 12 HOURS SCHEDULED
Status: CANCELLED | OUTPATIENT
Start: 2018-04-06

## 2018-04-06 ASSESSMENT — ENCOUNTER SYMPTOMS
COLOR CHANGE: 0
NAUSEA: 0
RECTAL PAIN: 0
ALLERGIC/IMMUNOLOGIC NEGATIVE: 1
BLOOD IN STOOL: 0
ABDOMINAL DISTENTION: 0
RHINORRHEA: 0
CHEST TIGHTNESS: 0
ABDOMINAL PAIN: 1
SHORTNESS OF BREATH: 0

## 2018-04-13 ENCOUNTER — ANESTHESIA EVENT (OUTPATIENT)
Dept: OPERATING ROOM | Age: 63
End: 2018-04-13
Payer: MEDICAID

## 2018-04-13 ENCOUNTER — ANESTHESIA (OUTPATIENT)
Dept: OPERATING ROOM | Age: 63
End: 2018-04-13
Payer: MEDICAID

## 2018-04-13 ENCOUNTER — HOSPITAL ENCOUNTER (OUTPATIENT)
Age: 63
Setting detail: OUTPATIENT SURGERY
Discharge: HOME | End: 2018-04-13
Attending: SURGERY | Admitting: SURGERY
Payer: MEDICAID

## 2018-04-13 ENCOUNTER — HOSPITAL ENCOUNTER (OUTPATIENT)
Dept: GENERAL RADIOLOGY | Age: 63
Setting detail: OUTPATIENT SURGERY
Discharge: HOME | End: 2018-04-15
Attending: SURGERY
Payer: MEDICAID

## 2018-04-13 VITALS — SYSTOLIC BLOOD PRESSURE: 130 MMHG | DIASTOLIC BLOOD PRESSURE: 71 MMHG | OXYGEN SATURATION: 99 % | TEMPERATURE: 95.4 F

## 2018-04-13 VITALS
HEIGHT: 64 IN | DIASTOLIC BLOOD PRESSURE: 64 MMHG | OXYGEN SATURATION: 98 % | TEMPERATURE: 97.1 F | BODY MASS INDEX: 25.95 KG/M2 | RESPIRATION RATE: 16 BRPM | WEIGHT: 152 LBS | HEART RATE: 76 BPM | SYSTOLIC BLOOD PRESSURE: 133 MMHG

## 2018-04-13 DIAGNOSIS — K80.10 CHRONIC CHOLECYSTITIS WITH CALCULUS: Primary | Chronic | ICD-10-CM

## 2018-04-13 DIAGNOSIS — K80.50 STONES COMMON DUCT: ICD-10-CM

## 2018-04-13 PROCEDURE — 3700000000 HC ANESTHESIA ATTENDED CARE: Performed by: SURGERY

## 2018-04-13 PROCEDURE — 6360000004 HC RX CONTRAST MEDICATION: Performed by: SURGERY

## 2018-04-13 PROCEDURE — 2500000003 HC RX 250 WO HCPCS: Performed by: NURSE ANESTHETIST, CERTIFIED REGISTERED

## 2018-04-13 PROCEDURE — 7100000011 HC PHASE II RECOVERY - ADDTL 15 MIN: Performed by: SURGERY

## 2018-04-13 PROCEDURE — 6360000002 HC RX W HCPCS: Performed by: STUDENT IN AN ORGANIZED HEALTH CARE EDUCATION/TRAINING PROGRAM

## 2018-04-13 PROCEDURE — 7100000010 HC PHASE II RECOVERY - FIRST 15 MIN: Performed by: SURGERY

## 2018-04-13 PROCEDURE — 47563 LAPARO CHOLECYSTECTOMY/GRAPH: CPT | Performed by: SURGERY

## 2018-04-13 PROCEDURE — 7100000001 HC PACU RECOVERY - ADDTL 15 MIN: Performed by: SURGERY

## 2018-04-13 PROCEDURE — 3700000001 HC ADD 15 MINUTES (ANESTHESIA): Performed by: SURGERY

## 2018-04-13 PROCEDURE — 6360000002 HC RX W HCPCS: Performed by: SURGERY

## 2018-04-13 PROCEDURE — 7100000000 HC PACU RECOVERY - FIRST 15 MIN: Performed by: SURGERY

## 2018-04-13 PROCEDURE — 6370000000 HC RX 637 (ALT 250 FOR IP): Performed by: SURGERY

## 2018-04-13 PROCEDURE — 6360000002 HC RX W HCPCS: Performed by: NURSE ANESTHETIST, CERTIFIED REGISTERED

## 2018-04-13 PROCEDURE — 74300 X-RAY BILE DUCTS/PANCREAS: CPT

## 2018-04-13 PROCEDURE — 2580000003 HC RX 258: Performed by: SURGERY

## 2018-04-13 PROCEDURE — 3600000014 HC SURGERY LEVEL 4 ADDTL 15MIN: Performed by: SURGERY

## 2018-04-13 PROCEDURE — 2580000003 HC RX 258: Performed by: NURSE PRACTITIONER

## 2018-04-13 PROCEDURE — 3600000004 HC SURGERY LEVEL 4 BASE: Performed by: SURGERY

## 2018-04-13 RX ORDER — SODIUM CHLORIDE 0.9 % (FLUSH) 0.9 %
10 SYRINGE (ML) INJECTION PRN
Status: DISCONTINUED | OUTPATIENT
Start: 2018-04-13 | End: 2018-04-13 | Stop reason: HOSPADM

## 2018-04-13 RX ORDER — SODIUM CHLORIDE 0.9 % (FLUSH) 0.9 %
10 SYRINGE (ML) INJECTION EVERY 12 HOURS SCHEDULED
Status: DISCONTINUED | OUTPATIENT
Start: 2018-04-13 | End: 2018-04-13 | Stop reason: HOSPADM

## 2018-04-13 RX ORDER — LIDOCAINE HYDROCHLORIDE 20 MG/ML
INJECTION, SOLUTION INFILTRATION; PERINEURAL PRN
Status: DISCONTINUED | OUTPATIENT
Start: 2018-04-13 | End: 2018-04-13 | Stop reason: SDUPTHER

## 2018-04-13 RX ORDER — MORPHINE SULFATE 2 MG/ML
1 INJECTION, SOLUTION INTRAMUSCULAR; INTRAVENOUS
Status: DISCONTINUED | OUTPATIENT
Start: 2018-04-13 | End: 2018-04-13 | Stop reason: HOSPADM

## 2018-04-13 RX ORDER — FENTANYL CITRATE 50 UG/ML
INJECTION, SOLUTION INTRAMUSCULAR; INTRAVENOUS PRN
Status: DISCONTINUED | OUTPATIENT
Start: 2018-04-13 | End: 2018-04-13 | Stop reason: SDUPTHER

## 2018-04-13 RX ORDER — DEXAMETHASONE SODIUM PHOSPHATE 10 MG/ML
INJECTION INTRAMUSCULAR; INTRAVENOUS PRN
Status: DISCONTINUED | OUTPATIENT
Start: 2018-04-13 | End: 2018-04-13 | Stop reason: SDUPTHER

## 2018-04-13 RX ORDER — MIDAZOLAM HYDROCHLORIDE 1 MG/ML
INJECTION INTRAMUSCULAR; INTRAVENOUS PRN
Status: DISCONTINUED | OUTPATIENT
Start: 2018-04-13 | End: 2018-04-13 | Stop reason: SDUPTHER

## 2018-04-13 RX ORDER — SODIUM CHLORIDE, SODIUM LACTATE, POTASSIUM CHLORIDE, CALCIUM CHLORIDE 600; 310; 30; 20 MG/100ML; MG/100ML; MG/100ML; MG/100ML
INJECTION, SOLUTION INTRAVENOUS CONTINUOUS
Status: DISCONTINUED | OUTPATIENT
Start: 2018-04-13 | End: 2018-04-13 | Stop reason: HOSPADM

## 2018-04-13 RX ORDER — ROCURONIUM BROMIDE 10 MG/ML
INJECTION, SOLUTION INTRAVENOUS PRN
Status: DISCONTINUED | OUTPATIENT
Start: 2018-04-13 | End: 2018-04-13 | Stop reason: SDUPTHER

## 2018-04-13 RX ORDER — HYDROCODONE BITARTRATE AND ACETAMINOPHEN 5; 325 MG/1; MG/1
1 TABLET ORAL EVERY 4 HOURS PRN
Status: DISCONTINUED | OUTPATIENT
Start: 2018-04-13 | End: 2018-04-13 | Stop reason: HOSPADM

## 2018-04-13 RX ORDER — HYDROCODONE BITARTRATE AND ACETAMINOPHEN 5; 325 MG/1; MG/1
1 TABLET ORAL EVERY 6 HOURS PRN
Qty: 25 TABLET | Refills: 0 | Status: SHIPPED | OUTPATIENT
Start: 2018-04-13 | End: 2018-04-20

## 2018-04-13 RX ORDER — ACETAMINOPHEN 325 MG/1
650 TABLET ORAL EVERY 4 HOURS PRN
Status: DISCONTINUED | OUTPATIENT
Start: 2018-04-13 | End: 2018-04-13 | Stop reason: HOSPADM

## 2018-04-13 RX ORDER — DIPHENHYDRAMINE HYDROCHLORIDE 50 MG/ML
12.5 INJECTION INTRAMUSCULAR; INTRAVENOUS
Status: DISCONTINUED | OUTPATIENT
Start: 2018-04-13 | End: 2018-04-13 | Stop reason: HOSPADM

## 2018-04-13 RX ORDER — MAGNESIUM HYDROXIDE 1200 MG/15ML
LIQUID ORAL CONTINUOUS PRN
Status: DISCONTINUED | OUTPATIENT
Start: 2018-04-13 | End: 2018-04-13 | Stop reason: HOSPADM

## 2018-04-13 RX ORDER — LIDOCAINE HYDROCHLORIDE 10 MG/ML
1 INJECTION, SOLUTION EPIDURAL; INFILTRATION; INTRACAUDAL; PERINEURAL
Status: DISCONTINUED | OUTPATIENT
Start: 2018-04-13 | End: 2018-04-13 | Stop reason: HOSPADM

## 2018-04-13 RX ORDER — PROPOFOL 10 MG/ML
INJECTION, EMULSION INTRAVENOUS PRN
Status: DISCONTINUED | OUTPATIENT
Start: 2018-04-13 | End: 2018-04-13 | Stop reason: SDUPTHER

## 2018-04-13 RX ORDER — ONDANSETRON 2 MG/ML
4 INJECTION INTRAMUSCULAR; INTRAVENOUS
Status: DISCONTINUED | OUTPATIENT
Start: 2018-04-13 | End: 2018-04-13 | Stop reason: HOSPADM

## 2018-04-13 RX ORDER — METOCLOPRAMIDE HYDROCHLORIDE 5 MG/ML
10 INJECTION INTRAMUSCULAR; INTRAVENOUS
Status: DISCONTINUED | OUTPATIENT
Start: 2018-04-13 | End: 2018-04-13 | Stop reason: HOSPADM

## 2018-04-13 RX ORDER — FENTANYL CITRATE 50 UG/ML
50 INJECTION, SOLUTION INTRAMUSCULAR; INTRAVENOUS EVERY 10 MIN PRN
Status: DISCONTINUED | OUTPATIENT
Start: 2018-04-13 | End: 2018-04-13 | Stop reason: HOSPADM

## 2018-04-13 RX ORDER — GLYCOPYRROLATE 1 MG/5 ML
SYRINGE (ML) INTRAVENOUS PRN
Status: DISCONTINUED | OUTPATIENT
Start: 2018-04-13 | End: 2018-04-13 | Stop reason: SDUPTHER

## 2018-04-13 RX ORDER — PROPOFOL 10 MG/ML
INJECTION, EMULSION INTRAVENOUS CONTINUOUS PRN
Status: DISCONTINUED | OUTPATIENT
Start: 2018-04-13 | End: 2018-04-13 | Stop reason: SDUPTHER

## 2018-04-13 RX ORDER — HYDROCODONE BITARTRATE AND ACETAMINOPHEN 5; 325 MG/1; MG/1
2 TABLET ORAL EVERY 4 HOURS PRN
Status: DISCONTINUED | OUTPATIENT
Start: 2018-04-13 | End: 2018-04-13 | Stop reason: HOSPADM

## 2018-04-13 RX ORDER — ONDANSETRON 2 MG/ML
INJECTION INTRAMUSCULAR; INTRAVENOUS PRN
Status: DISCONTINUED | OUTPATIENT
Start: 2018-04-13 | End: 2018-04-13 | Stop reason: SDUPTHER

## 2018-04-13 RX ORDER — MEPERIDINE HYDROCHLORIDE 25 MG/ML
12.5 INJECTION INTRAMUSCULAR; INTRAVENOUS; SUBCUTANEOUS EVERY 5 MIN PRN
Status: DISCONTINUED | OUTPATIENT
Start: 2018-04-13 | End: 2018-04-13 | Stop reason: HOSPADM

## 2018-04-13 RX ORDER — KETOROLAC TROMETHAMINE 30 MG/ML
30 INJECTION, SOLUTION INTRAMUSCULAR; INTRAVENOUS ONCE
Status: COMPLETED | OUTPATIENT
Start: 2018-04-13 | End: 2018-04-13

## 2018-04-13 RX ADMIN — PROPOFOL 150 MG: 10 INJECTION, EMULSION INTRAVENOUS at 08:40

## 2018-04-13 RX ADMIN — MIDAZOLAM HYDROCHLORIDE 1 MG: 1 INJECTION, SOLUTION INTRAMUSCULAR; INTRAVENOUS at 08:31

## 2018-04-13 RX ADMIN — FENTANYL CITRATE 50 MCG: 50 INJECTION, SOLUTION INTRAMUSCULAR; INTRAVENOUS at 08:48

## 2018-04-13 RX ADMIN — HYDROCODONE BITARTRATE AND ACETAMINOPHEN 1 TABLET: 5; 325 TABLET ORAL at 11:01

## 2018-04-13 RX ADMIN — KETOROLAC TROMETHAMINE 30 MG: 30 INJECTION, SOLUTION INTRAMUSCULAR at 07:40

## 2018-04-13 RX ADMIN — DEXAMETHASONE SODIUM PHOSPHATE 10 MG: 10 INJECTION INTRAMUSCULAR; INTRAVENOUS at 08:53

## 2018-04-13 RX ADMIN — ONDANSETRON 4 MG: 2 INJECTION INTRAMUSCULAR; INTRAVENOUS at 09:20

## 2018-04-13 RX ADMIN — SUGAMMADEX 200 MG: 100 INJECTION, SOLUTION INTRAVENOUS at 09:29

## 2018-04-13 RX ADMIN — FENTANYL CITRATE 25 MCG: 50 INJECTION, SOLUTION INTRAMUSCULAR; INTRAVENOUS at 09:07

## 2018-04-13 RX ADMIN — FENTANYL CITRATE 25 MCG: 50 INJECTION, SOLUTION INTRAMUSCULAR; INTRAVENOUS at 10:11

## 2018-04-13 RX ADMIN — SODIUM CHLORIDE, POTASSIUM CHLORIDE, SODIUM LACTATE AND CALCIUM CHLORIDE: 600; 310; 30; 20 INJECTION, SOLUTION INTRAVENOUS at 10:18

## 2018-04-13 RX ADMIN — PROPOFOL 50 MG: 10 INJECTION, EMULSION INTRAVENOUS at 08:45

## 2018-04-13 RX ADMIN — SODIUM CHLORIDE, POTASSIUM CHLORIDE, SODIUM LACTATE AND CALCIUM CHLORIDE: 600; 310; 30; 20 INJECTION, SOLUTION INTRAVENOUS at 07:40

## 2018-04-13 RX ADMIN — FENTANYL CITRATE 25 MCG: 50 INJECTION, SOLUTION INTRAMUSCULAR; INTRAVENOUS at 10:23

## 2018-04-13 RX ADMIN — ROCURONIUM BROMIDE 40 MG: 10 INJECTION INTRAVENOUS at 08:41

## 2018-04-13 RX ADMIN — PROPOFOL 120 MCG/KG/MIN: 10 INJECTION, EMULSION INTRAVENOUS at 08:46

## 2018-04-13 RX ADMIN — CEFAZOLIN SODIUM 2 G: 2 SOLUTION INTRAVENOUS at 08:49

## 2018-04-13 RX ADMIN — LIDOCAINE HYDROCHLORIDE 50 MG: 20 INJECTION, SOLUTION INFILTRATION; PERINEURAL at 08:41

## 2018-04-13 RX ADMIN — FENTANYL CITRATE 50 MCG: 50 INJECTION, SOLUTION INTRAMUSCULAR; INTRAVENOUS at 08:40

## 2018-04-13 RX ADMIN — SUGAMMADEX 200 MG: 100 INJECTION, SOLUTION INTRAVENOUS at 09:37

## 2018-04-13 RX ADMIN — Medication 0.2 MG: at 08:59

## 2018-04-13 ASSESSMENT — PULMONARY FUNCTION TESTS
PIF_VALUE: 21
PIF_VALUE: 17
PIF_VALUE: 21
PIF_VALUE: 20
PIF_VALUE: 16
PIF_VALUE: 16
PIF_VALUE: 28
PIF_VALUE: 21
PIF_VALUE: 21
PIF_VALUE: 20
PIF_VALUE: 18
PIF_VALUE: 21
PIF_VALUE: 2
PIF_VALUE: 15
PIF_VALUE: 29
PIF_VALUE: 1
PIF_VALUE: 20
PIF_VALUE: 28
PIF_VALUE: 20
PIF_VALUE: 31
PIF_VALUE: 32
PIF_VALUE: 20
PIF_VALUE: 2
PIF_VALUE: 31
PIF_VALUE: 30
PIF_VALUE: 21
PIF_VALUE: 14
PIF_VALUE: 10
PIF_VALUE: 27
PIF_VALUE: 21
PIF_VALUE: 32
PIF_VALUE: 30
PIF_VALUE: 30
PIF_VALUE: 28
PIF_VALUE: 29
PIF_VALUE: 29
PIF_VALUE: 28
PIF_VALUE: 20
PIF_VALUE: 2
PIF_VALUE: 10
PIF_VALUE: 19
PIF_VALUE: 26
PIF_VALUE: 29
PIF_VALUE: 21
PIF_VALUE: 21
PIF_VALUE: 29
PIF_VALUE: 26
PIF_VALUE: 30
PIF_VALUE: 18
PIF_VALUE: 27
PIF_VALUE: 21
PIF_VALUE: 20
PIF_VALUE: 20
PIF_VALUE: 23
PIF_VALUE: 28
PIF_VALUE: 8
PIF_VALUE: 29
PIF_VALUE: 30
PIF_VALUE: 27
PIF_VALUE: 33
PIF_VALUE: 28
PIF_VALUE: 20
PIF_VALUE: 13
PIF_VALUE: 2
PIF_VALUE: 1
PIF_VALUE: 20
PIF_VALUE: 21
PIF_VALUE: 21

## 2018-04-13 ASSESSMENT — PAIN SCALES - GENERAL
PAINLEVEL_OUTOF10: 5
PAINLEVEL_OUTOF10: 8
PAINLEVEL_OUTOF10: 7
PAINLEVEL_OUTOF10: 5
PAINLEVEL_OUTOF10: 6
PAINLEVEL_OUTOF10: 6
PAINLEVEL_OUTOF10: 0
PAINLEVEL_OUTOF10: 7
PAINLEVEL_OUTOF10: 7

## 2018-04-13 ASSESSMENT — PAIN DESCRIPTION - LOCATION: LOCATION: ABDOMEN

## 2018-04-13 ASSESSMENT — PAIN DESCRIPTION - PAIN TYPE
TYPE: SURGICAL PAIN
TYPE: SURGICAL PAIN

## 2018-04-13 ASSESSMENT — PAIN DESCRIPTION - ORIENTATION: ORIENTATION: RIGHT

## 2018-04-13 ASSESSMENT — PAIN - FUNCTIONAL ASSESSMENT: PAIN_FUNCTIONAL_ASSESSMENT: 0-10

## 2018-04-21 RX ORDER — LEVOTHYROXINE SODIUM 0.05 MG/1
TABLET ORAL
Qty: 30 TABLET | Refills: 3 | Status: SHIPPED | OUTPATIENT
Start: 2018-04-21 | End: 2018-09-25 | Stop reason: SDUPTHER

## 2018-06-12 ENCOUNTER — HOSPITAL ENCOUNTER (OUTPATIENT)
Dept: PHYSICAL THERAPY | Age: 63
Setting detail: THERAPIES SERIES
Discharge: HOME | End: 2018-06-12
Payer: MEDICAID

## 2018-06-12 PROCEDURE — 97162 PT EVAL MOD COMPLEX 30 MIN: CPT

## 2018-06-12 PROCEDURE — 97110 THERAPEUTIC EXERCISES: CPT

## 2018-06-12 ASSESSMENT — PAIN SCALES - GENERAL: PAINLEVEL_OUTOF10: 5

## 2018-06-12 ASSESSMENT — PAIN DESCRIPTION - DESCRIPTORS: DESCRIPTORS: NUMBNESS

## 2018-06-12 ASSESSMENT — PAIN DESCRIPTION - ORIENTATION: ORIENTATION: RIGHT

## 2018-06-12 ASSESSMENT — PAIN DESCRIPTION - FREQUENCY: FREQUENCY: INTERMITTENT

## 2018-06-12 ASSESSMENT — PAIN DESCRIPTION - LOCATION: LOCATION: ARM;HAND

## 2018-06-12 ASSESSMENT — PAIN DESCRIPTION - PAIN TYPE: TYPE: CHRONIC PAIN

## 2018-06-22 ENCOUNTER — HOSPITAL ENCOUNTER (OUTPATIENT)
Dept: PHYSICAL THERAPY | Age: 63
Setting detail: THERAPIES SERIES
Discharge: HOME | End: 2018-06-22
Payer: MEDICAID

## 2018-06-25 ENCOUNTER — TELEPHONE (OUTPATIENT)
Dept: SURGERY | Age: 63
End: 2018-06-25

## 2018-06-25 RX ORDER — ONDANSETRON 4 MG/1
4 TABLET, ORALLY DISINTEGRATING ORAL EVERY 8 HOURS PRN
Qty: 30 TABLET | Refills: 1 | Status: SHIPPED | OUTPATIENT
Start: 2018-06-25 | End: 2018-07-04

## 2018-06-26 ENCOUNTER — HOSPITAL ENCOUNTER (OUTPATIENT)
Dept: PHYSICAL THERAPY | Age: 63
Setting detail: THERAPIES SERIES
Discharge: HOME | End: 2018-06-26
Payer: MEDICAID

## 2018-07-03 ENCOUNTER — HOSPITAL ENCOUNTER (OUTPATIENT)
Dept: PHYSICAL THERAPY | Age: 63
Setting detail: THERAPIES SERIES
Discharge: HOME | End: 2018-07-03
Payer: MEDICAID

## 2018-07-04 ENCOUNTER — HOSPITAL ENCOUNTER (EMERGENCY)
Age: 63
Discharge: HOME | End: 2018-07-04
Payer: MEDICAID

## 2018-07-04 ENCOUNTER — APPOINTMENT (OUTPATIENT)
Dept: CT IMAGING | Age: 63
End: 2018-07-04
Payer: MEDICAID

## 2018-07-04 ENCOUNTER — APPOINTMENT (OUTPATIENT)
Dept: GENERAL RADIOLOGY | Age: 63
End: 2018-07-04
Payer: MEDICAID

## 2018-07-04 VITALS
RESPIRATION RATE: 18 BRPM | SYSTOLIC BLOOD PRESSURE: 132 MMHG | BODY MASS INDEX: 23.9 KG/M2 | HEART RATE: 72 BPM | WEIGHT: 140 LBS | DIASTOLIC BLOOD PRESSURE: 62 MMHG | OXYGEN SATURATION: 99 % | HEIGHT: 64 IN | TEMPERATURE: 98.2 F

## 2018-07-04 DIAGNOSIS — M54.50 ACUTE LEFT-SIDED LOW BACK PAIN WITHOUT SCIATICA: ICD-10-CM

## 2018-07-04 DIAGNOSIS — R10.84 GENERALIZED ABDOMINAL PAIN: ICD-10-CM

## 2018-07-04 DIAGNOSIS — R07.9 CHEST PAIN, UNSPECIFIED TYPE: Primary | ICD-10-CM

## 2018-07-04 LAB
ALBUMIN SERPL-MCNC: 4 G/DL (ref 3.9–4.9)
ALP BLD-CCNC: 90 U/L (ref 40–130)
ALT SERPL-CCNC: 25 U/L (ref 0–33)
ANION GAP SERPL CALCULATED.3IONS-SCNC: 16 MEQ/L (ref 7–13)
AST SERPL-CCNC: 41 U/L (ref 0–35)
BASOPHILS ABSOLUTE: 0 K/UL (ref 0–0.2)
BASOPHILS RELATIVE PERCENT: 0.4 %
BILIRUB SERPL-MCNC: 0.4 MG/DL (ref 0–1.2)
BILIRUBIN URINE: NEGATIVE
BLOOD, URINE: NEGATIVE
BUN BLDV-MCNC: 21 MG/DL (ref 8–23)
CALCIUM SERPL-MCNC: 9.2 MG/DL (ref 8.6–10.2)
CHLORIDE BLD-SCNC: 97 MEQ/L (ref 98–107)
CLARITY: CLEAR
CO2: 23 MEQ/L (ref 22–29)
COLOR: YELLOW
CREAT SERPL-MCNC: 0.68 MG/DL (ref 0.5–0.9)
EKG ATRIAL RATE: 61 BPM
EKG P AXIS: 50 DEGREES
EKG P-R INTERVAL: 140 MS
EKG Q-T INTERVAL: 460 MS
EKG QRS DURATION: 80 MS
EKG QTC CALCULATION (BAZETT): 463 MS
EKG R AXIS: 40 DEGREES
EKG T AXIS: 24 DEGREES
EKG VENTRICULAR RATE: 61 BPM
EOSINOPHILS ABSOLUTE: 0.1 K/UL (ref 0–0.7)
EOSINOPHILS RELATIVE PERCENT: 0.8 %
GFR AFRICAN AMERICAN: >60
GFR NON-AFRICAN AMERICAN: >60
GLOBULIN: 2.4 G/DL (ref 2.3–3.5)
GLUCOSE BLD-MCNC: 96 MG/DL (ref 74–109)
GLUCOSE URINE: NEGATIVE MG/DL
HCT VFR BLD CALC: 38.2 % (ref 37–47)
HEMOGLOBIN: 13.2 G/DL (ref 12–16)
KETONES, URINE: ABNORMAL MG/DL
LEUKOCYTE ESTERASE, URINE: NEGATIVE
LIPASE: 56 U/L (ref 13–60)
LYMPHOCYTES ABSOLUTE: 1.3 K/UL (ref 1–4.8)
LYMPHOCYTES RELATIVE PERCENT: 18.7 %
MCH RBC QN AUTO: 31.5 PG (ref 27–31.3)
MCHC RBC AUTO-ENTMCNC: 34.4 % (ref 33–37)
MCV RBC AUTO: 91.3 FL (ref 82–100)
MONOCYTES ABSOLUTE: 0.7 K/UL (ref 0.2–0.8)
MONOCYTES RELATIVE PERCENT: 9.7 %
NEUTROPHILS ABSOLUTE: 4.9 K/UL (ref 1.4–6.5)
NEUTROPHILS RELATIVE PERCENT: 70.4 %
NITRITE, URINE: NEGATIVE
PDW BLD-RTO: 13.1 % (ref 11.5–14.5)
PH UA: 6 (ref 5–9)
PLATELET # BLD: 184 K/UL (ref 130–400)
POTASSIUM SERPL-SCNC: 3.4 MEQ/L (ref 3.5–5.1)
PROTEIN UA: NEGATIVE MG/DL
RBC # BLD: 4.19 M/UL (ref 4.2–5.4)
SODIUM BLD-SCNC: 136 MEQ/L (ref 132–144)
SPECIFIC GRAVITY UA: 1.02 (ref 1–1.03)
TOTAL PROTEIN: 6.4 G/DL (ref 6.4–8.1)
TROPONIN: <0.01 NG/ML (ref 0–0.01)
URINE REFLEX TO CULTURE: ABNORMAL
UROBILINOGEN, URINE: 0.2 E.U./DL
WBC # BLD: 7 K/UL (ref 4.8–10.8)

## 2018-07-04 PROCEDURE — 80053 COMPREHEN METABOLIC PANEL: CPT

## 2018-07-04 PROCEDURE — 2580000003 HC RX 258: Performed by: PERSONAL EMERGENCY RESPONSE ATTENDANT

## 2018-07-04 PROCEDURE — 84484 ASSAY OF TROPONIN QUANT: CPT

## 2018-07-04 PROCEDURE — 85025 COMPLETE CBC W/AUTO DIFF WBC: CPT

## 2018-07-04 PROCEDURE — 83690 ASSAY OF LIPASE: CPT

## 2018-07-04 PROCEDURE — 71045 X-RAY EXAM CHEST 1 VIEW: CPT

## 2018-07-04 PROCEDURE — 96376 TX/PRO/DX INJ SAME DRUG ADON: CPT

## 2018-07-04 PROCEDURE — 93005 ELECTROCARDIOGRAM TRACING: CPT

## 2018-07-04 PROCEDURE — 6360000002 HC RX W HCPCS: Performed by: PERSONAL EMERGENCY RESPONSE ATTENDANT

## 2018-07-04 PROCEDURE — 99285 EMERGENCY DEPT VISIT HI MDM: CPT

## 2018-07-04 PROCEDURE — 96374 THER/PROPH/DIAG INJ IV PUSH: CPT

## 2018-07-04 PROCEDURE — 81003 URINALYSIS AUTO W/O SCOPE: CPT

## 2018-07-04 PROCEDURE — 36415 COLL VENOUS BLD VENIPUNCTURE: CPT

## 2018-07-04 PROCEDURE — 96375 TX/PRO/DX INJ NEW DRUG ADDON: CPT

## 2018-07-04 PROCEDURE — 74176 CT ABD & PELVIS W/O CONTRAST: CPT

## 2018-07-04 RX ORDER — ONDANSETRON 2 MG/ML
4 INJECTION INTRAMUSCULAR; INTRAVENOUS ONCE
Status: COMPLETED | OUTPATIENT
Start: 2018-07-04 | End: 2018-07-04

## 2018-07-04 RX ORDER — KETOROLAC TROMETHAMINE 30 MG/ML
30 INJECTION, SOLUTION INTRAMUSCULAR; INTRAVENOUS ONCE
Status: COMPLETED | OUTPATIENT
Start: 2018-07-04 | End: 2018-07-04

## 2018-07-04 RX ORDER — ONDANSETRON 4 MG/1
4 TABLET, ORALLY DISINTEGRATING ORAL EVERY 8 HOURS PRN
Qty: 20 TABLET | Refills: 0 | Status: SHIPPED | OUTPATIENT
Start: 2018-07-04 | End: 2020-10-28

## 2018-07-04 RX ORDER — 0.9 % SODIUM CHLORIDE 0.9 %
1000 INTRAVENOUS SOLUTION INTRAVENOUS ONCE
Status: COMPLETED | OUTPATIENT
Start: 2018-07-04 | End: 2018-07-04

## 2018-07-04 RX ADMIN — ONDANSETRON 4 MG: 2 INJECTION INTRAMUSCULAR; INTRAVENOUS at 03:43

## 2018-07-04 RX ADMIN — ONDANSETRON 4 MG: 2 INJECTION INTRAMUSCULAR; INTRAVENOUS at 01:23

## 2018-07-04 RX ADMIN — KETOROLAC TROMETHAMINE 30 MG: 30 INJECTION, SOLUTION INTRAMUSCULAR at 01:24

## 2018-07-04 RX ADMIN — SODIUM CHLORIDE 1000 ML: 9 INJECTION, SOLUTION INTRAVENOUS at 01:23

## 2018-07-04 ASSESSMENT — ENCOUNTER SYMPTOMS
COLOR CHANGE: 0
DIARRHEA: 0
ABDOMINAL PAIN: 1
SORE THROAT: 0
BLOOD IN STOOL: 0
BACK PAIN: 1
COUGH: 0
VOMITING: 0
RHINORRHEA: 0
NAUSEA: 0
SHORTNESS OF BREATH: 1

## 2018-07-04 ASSESSMENT — PAIN DESCRIPTION - FREQUENCY
FREQUENCY: INTERMITTENT
FREQUENCY: CONTINUOUS

## 2018-07-04 ASSESSMENT — PAIN DESCRIPTION - PROGRESSION: CLINICAL_PROGRESSION: GRADUALLY IMPROVING

## 2018-07-04 ASSESSMENT — PAIN SCALES - GENERAL
PAINLEVEL_OUTOF10: 7
PAINLEVEL_OUTOF10: 6
PAINLEVEL_OUTOF10: 7

## 2018-07-04 ASSESSMENT — PAIN DESCRIPTION - PAIN TYPE: TYPE: ACUTE PAIN

## 2018-07-04 ASSESSMENT — PAIN DESCRIPTION - LOCATION
LOCATION: ABDOMEN
LOCATION: ABDOMEN

## 2018-07-04 ASSESSMENT — PAIN DESCRIPTION - ONSET: ONSET: SUDDEN

## 2018-07-04 ASSESSMENT — PAIN DESCRIPTION - DESCRIPTORS
DESCRIPTORS: CRAMPING
DESCRIPTORS: CRAMPING

## 2018-07-04 NOTE — ED PROVIDER NOTES
calculus 2/1/2018    Depression     History of blood transfusion     Hyperlipidemia     Hypertension     Multiple gallstones 10/3/2017    Prolonged emergence from general anesthesia     took 10-13 hours to wake up    [facility]ra[facility]) ([facility] [address]     Thyroid disease          SURGICAL HISTORY       Past Surgical History:   Procedure Laterality Date    BREAST BIOPSY Right 4/16/15    ULTRASOUND GUIDED BIOPSY RIGHT AXILLA, RIGHT AXILLARY DISSECTION,ULTRASOUND GUIDED RIGHT BREAST BIOPSY    COLONOSCOPY  9/12/14    polypectomy jarmoszuk    SD LAP,CHOLECYSTECTOMY/GRAPH N/A 4/13/2018    LAPAROSCOPIC CHOLECYSTECTOMY WITH GRAMS performed by Consuelo Mendoza MD at [address] [facility] V[facility] W/SUBQ PORT/ CTR/PRPH INSJ N/A 2/21/2017    PORT REMOVAL performed by Mannie Pride MD at [address] [facility]      tail bone    TONSILLECTOMY      TUBAL LIGATION      TUNNELED VENOUS PORT PLACEMENT Left 06/02/15    SUBCLAVIAN VEIN    WRIST GANGLION EXCISION Left          CURRENT MEDICATIONS       Previous Medications    ALBUTEROL SULFATE HFA (PROVENTIL HFA) 108 (90 BASE) MCG/ACT INHALER    Inhale 2 puffs into the lungs every 6 hours as needed for Wheezing    LEVOTHYROXINE (SYNTHROID) 50 MCG TABLET    take 1 tablet by mouth once daily    PANTOPRAZOLE (PROTONIX) 40 MG TABLET    take 1 tablet by mouth once daily    PRAVASTATIN (PRAVACHOL) 20 MG TABLET    take 1 tablet by mouth every evening    SERTRALINE (ZOLOFT) 100 MG TABLET    take 1 tablet by mouth once daily    VERAPAMIL (CALAN SR) 180 MG EXTENDED RELEASE TABLET    take 1 tablet by mouth once daily       ALLERGIES     Latex; Codeine; Oxycodone; Pcn [penicillins];  Tape Sapna Larger tape]; and Tramadol    FAMILY HISTORY       Family History   Problem Relation Age of Onset    Heart Disease Mother     Colon Cancer Father     Colon Polyps Father     Other Father 58        Post Op    Other Brother         Accidental Shooting    Brain Cancer Child She has normal reflexes. Skin: Skin is warm and dry. No rash noted. Psychiatric: She has a normal mood and affect. Her behavior is normal. Judgment and thought content normal.       DIAGNOSTIC RESULTS     EKG: All EKG's are interpreted by the Emergency Department Physician who either signs or Co-signs this chart in the absence of a cardiologist.    EKG shows normal sinus rhythm, heart rate 61, normal intervals, normal axis, no ST segment changes    RADIOLOGY:   Non-plain film images such as CT, Ultrasound and MRI are read by the radiologist. Plain radiographic images are visualized and preliminarily interpreted by the emergency physician with the below findings:    Interpretation per the Radiologist below, if available at the time of this note:    XR CHEST PORTABLE    (Results Pending)   CT ABDOMEN PELVIS WO CONTRAST Additional Contrast? None    (Results Pending)           LABS:  Labs Reviewed   COMPREHENSIVE METABOLIC PANEL - Abnormal; Notable for the following:        Result Value    Potassium 3.4 (*)     Chloride 97 (*)     Anion Gap 16 (*)     AST 41 (*)     All other components within normal limits   CBC WITH AUTO DIFFERENTIAL - Abnormal; Notable for the following:     RBC 4.19 (*)     MCH 31.5 (*)     All other components within normal limits   URINE RT REFLEX TO CULTURE - Abnormal; Notable for the following:     Ketones, Urine TRACE (*)     All other components within normal limits   TROPONIN   LIPASE       All other labs were within normal range or not returned as of this dictation. EMERGENCY DEPARTMENT COURSE and DIFFERENTIAL DIAGNOSIS/MDM:   Vitals:    Vitals:    07/04/18 0053 07/04/18 0148 07/04/18 0301   BP: (!) 153/74 (!) 131/57 131/62   Pulse: 59 58 71   Resp: 20 20 18   Temp: 98.2 °F (36.8 °C) 98.2 °F (36.8 °C)    TempSrc: Oral Oral    SpO2: 99% 99% 98%   Weight: 140 lb (63.5 kg)     Height: 5' 4\" (1.626 m)           MDM    CT abdomen shows no acute process. Blood work is unremarkable.

## 2018-07-05 PROCEDURE — 93010 ELECTROCARDIOGRAM REPORT: CPT | Performed by: INTERNAL MEDICINE

## 2018-07-13 RX ORDER — PANTOPRAZOLE SODIUM 40 MG/1
TABLET, DELAYED RELEASE ORAL
Qty: 30 TABLET | Refills: 3 | Status: SHIPPED | OUTPATIENT
Start: 2018-07-13 | End: 2020-10-28

## 2018-07-13 RX ORDER — PRAVASTATIN SODIUM 20 MG
TABLET ORAL
Qty: 90 TABLET | Refills: 1 | Status: SHIPPED | OUTPATIENT
Start: 2018-07-13 | End: 2019-09-03 | Stop reason: SDUPTHER

## 2018-07-24 ENCOUNTER — HOSPITAL ENCOUNTER (OUTPATIENT)
Dept: PHYSICAL THERAPY | Age: 63
Setting detail: THERAPIES SERIES
Discharge: HOME | End: 2018-07-24
Payer: MEDICAID

## 2018-09-25 RX ORDER — SERTRALINE HYDROCHLORIDE 100 MG/1
TABLET, FILM COATED ORAL
Qty: 90 TABLET | Refills: 0 | Status: SHIPPED | OUTPATIENT
Start: 2018-09-25 | End: 2019-07-10 | Stop reason: SDUPTHER

## 2018-09-25 RX ORDER — LEVOTHYROXINE SODIUM 0.05 MG/1
TABLET ORAL
Qty: 30 TABLET | Refills: 3 | Status: SHIPPED | OUTPATIENT
Start: 2018-09-25 | End: 2019-09-16 | Stop reason: SDUPTHER

## 2018-09-28 PROBLEM — D05.01 LOBULAR CARCINOMA IN SITU OF RIGHT BREAST: Status: ACTIVE | Noted: 2018-09-28

## 2018-09-28 PROBLEM — Z85.3 PERSONAL HISTORY OF MALIGNANT NEOPLASM OF BREAST: Status: ACTIVE | Noted: 2018-09-28

## 2018-09-28 PROBLEM — C77.3 SECONDARY AND UNSPECIFIED MALIGNANT NEOPLASM OF AXILLA AND UPPER LIMB LYMPH NODES (HCC): Status: ACTIVE | Noted: 2018-09-28

## 2018-09-28 PROBLEM — I97.2 POSTMASTECTOMY LYMPHEDEMA SYNDROME: Status: ACTIVE | Noted: 2018-09-28

## 2018-09-28 PROBLEM — Z17.1 ESTROGEN RECEPTOR NEGATIVE STATUS (ER-): Status: ACTIVE | Noted: 2018-09-28

## 2018-10-16 ENCOUNTER — HOSPITAL ENCOUNTER (OUTPATIENT)
Dept: WOMENS IMAGING | Age: 63
Discharge: HOME | End: 2018-10-18
Payer: MEDICAID

## 2018-10-16 DIAGNOSIS — Z85.3 HISTORY OF BREAST CANCER: ICD-10-CM

## 2018-10-16 PROCEDURE — G0279 TOMOSYNTHESIS, MAMMO: HCPCS

## 2018-11-09 DIAGNOSIS — C50.611 MALIGNANT NEOPLASM OF AXILLARY TAIL OF RIGHT FEMALE BREAST, UNSPECIFIED ESTROGEN RECEPTOR STATUS (HCC): ICD-10-CM

## 2018-11-09 DIAGNOSIS — C77.3 SECONDARY AND UNSPECIFIED MALIGNANT NEOPLASM OF AXILLA AND UPPER LIMB LYMPH NODES (HCC): ICD-10-CM

## 2018-11-09 LAB
ALBUMIN SERPL-MCNC: 4.5 G/DL (ref 3.9–4.9)
ALP BLD-CCNC: 91 U/L (ref 40–130)
ALT SERPL-CCNC: 15 U/L (ref 0–33)
ANION GAP SERPL CALCULATED.3IONS-SCNC: 15 MEQ/L (ref 7–13)
AST SERPL-CCNC: 21 U/L (ref 0–35)
BILIRUB SERPL-MCNC: 0.4 MG/DL (ref 0–1.2)
BUN BLDV-MCNC: 20 MG/DL (ref 8–23)
CALCIUM SERPL-MCNC: 9.2 MG/DL (ref 8.6–10.2)
CHLORIDE BLD-SCNC: 104 MEQ/L (ref 98–107)
CO2: 23 MEQ/L (ref 22–29)
CREAT SERPL-MCNC: 0.63 MG/DL (ref 0.5–0.9)
GFR AFRICAN AMERICAN: >60
GFR NON-AFRICAN AMERICAN: >60
GLOBULIN: 2.6 G/DL (ref 2.3–3.5)
GLUCOSE BLD-MCNC: 97 MG/DL (ref 74–109)
POTASSIUM SERPL-SCNC: 4.1 MEQ/L (ref 3.5–5.1)
SODIUM BLD-SCNC: 142 MEQ/L (ref 132–144)
TOTAL PROTEIN: 7.1 G/DL (ref 6.4–8.1)

## 2018-12-19 ENCOUNTER — OFFICE VISIT (OUTPATIENT)
Dept: INTERNAL MEDICINE CLINIC | Age: 63
End: 2018-12-19
Payer: MEDICAID

## 2018-12-19 VITALS
BODY MASS INDEX: 25.58 KG/M2 | DIASTOLIC BLOOD PRESSURE: 76 MMHG | OXYGEN SATURATION: 98 % | HEART RATE: 63 BPM | WEIGHT: 149 LBS | TEMPERATURE: 97.8 F | RESPIRATION RATE: 17 BRPM | SYSTOLIC BLOOD PRESSURE: 140 MMHG

## 2018-12-19 DIAGNOSIS — J45.20 MILD INTERMITTENT ASTHMA, UNSPECIFIED WHETHER COMPLICATED: ICD-10-CM

## 2018-12-19 DIAGNOSIS — E03.9 HYPOTHYROIDISM, UNSPECIFIED TYPE: ICD-10-CM

## 2018-12-19 DIAGNOSIS — I89.0 LYMPH EDEMA: ICD-10-CM

## 2018-12-19 DIAGNOSIS — C50.611 MALIGNANT NEOPLASM OF AXILLARY TAIL OF RIGHT FEMALE BREAST, UNSPECIFIED ESTROGEN RECEPTOR STATUS (HCC): Primary | ICD-10-CM

## 2018-12-19 DIAGNOSIS — I47.1 SVT (SUPRAVENTRICULAR TACHYCARDIA) (HCC): ICD-10-CM

## 2018-12-19 DIAGNOSIS — Z23 NEED FOR PNEUMOCOCCAL VACCINATION: ICD-10-CM

## 2018-12-19 PROCEDURE — 99213 OFFICE O/P EST LOW 20 MIN: CPT | Performed by: FAMILY MEDICINE

## 2018-12-19 PROCEDURE — G8484 FLU IMMUNIZE NO ADMIN: HCPCS | Performed by: FAMILY MEDICINE

## 2018-12-19 PROCEDURE — 90471 IMMUNIZATION ADMIN: CPT | Performed by: FAMILY MEDICINE

## 2018-12-19 PROCEDURE — 3017F COLORECTAL CA SCREEN DOC REV: CPT | Performed by: FAMILY MEDICINE

## 2018-12-19 PROCEDURE — 90670 PCV13 VACCINE IM: CPT | Performed by: FAMILY MEDICINE

## 2018-12-19 PROCEDURE — G8419 CALC BMI OUT NRM PARAM NOF/U: HCPCS | Performed by: FAMILY MEDICINE

## 2018-12-19 PROCEDURE — 1036F TOBACCO NON-USER: CPT | Performed by: FAMILY MEDICINE

## 2018-12-19 PROCEDURE — G8427 DOCREV CUR MEDS BY ELIG CLIN: HCPCS | Performed by: FAMILY MEDICINE

## 2018-12-19 RX ORDER — ALBUTEROL SULFATE 90 UG/1
2 AEROSOL, METERED RESPIRATORY (INHALATION)
COMMUNITY
Start: 2013-01-31 | End: 2019-02-04 | Stop reason: ALTCHOICE

## 2018-12-19 RX ORDER — PREDNISONE 10 MG/1
TABLET ORAL
Qty: 40 TABLET | Refills: 0 | Status: SHIPPED | OUTPATIENT
Start: 2018-12-19 | End: 2019-02-04 | Stop reason: ALTCHOICE

## 2018-12-19 ASSESSMENT — ENCOUNTER SYMPTOMS
COUGH: 0
RESPIRATORY NEGATIVE: 1
CHEST TIGHTNESS: 0
RHINORRHEA: 0
GASTROINTESTINAL NEGATIVE: 1
EYES NEGATIVE: 1

## 2019-01-16 DIAGNOSIS — E03.9 HYPOTHYROIDISM, UNSPECIFIED TYPE: ICD-10-CM

## 2019-01-16 LAB — TSH SERPL DL<=0.05 MIU/L-ACNC: 3.1 UIU/ML (ref 0.27–4.2)

## 2019-02-04 ENCOUNTER — OFFICE VISIT (OUTPATIENT)
Dept: INTERNAL MEDICINE CLINIC | Age: 64
End: 2019-02-04
Payer: MEDICAID

## 2019-02-04 VITALS
OXYGEN SATURATION: 99 % | RESPIRATION RATE: 16 BRPM | HEART RATE: 67 BPM | WEIGHT: 148.8 LBS | SYSTOLIC BLOOD PRESSURE: 130 MMHG | HEIGHT: 64 IN | BODY MASS INDEX: 25.4 KG/M2 | TEMPERATURE: 98 F | DIASTOLIC BLOOD PRESSURE: 70 MMHG

## 2019-02-04 DIAGNOSIS — I89.0 LYMPHEDEMA OF RIGHT ARM: ICD-10-CM

## 2019-02-04 DIAGNOSIS — M79.642 LEFT HAND PAIN: Primary | ICD-10-CM

## 2019-02-04 PROCEDURE — 1036F TOBACCO NON-USER: CPT | Performed by: NURSE PRACTITIONER

## 2019-02-04 PROCEDURE — G8427 DOCREV CUR MEDS BY ELIG CLIN: HCPCS | Performed by: NURSE PRACTITIONER

## 2019-02-04 PROCEDURE — G8484 FLU IMMUNIZE NO ADMIN: HCPCS | Performed by: NURSE PRACTITIONER

## 2019-02-04 PROCEDURE — 99213 OFFICE O/P EST LOW 20 MIN: CPT | Performed by: NURSE PRACTITIONER

## 2019-02-04 PROCEDURE — G8419 CALC BMI OUT NRM PARAM NOF/U: HCPCS | Performed by: NURSE PRACTITIONER

## 2019-02-04 PROCEDURE — 3017F COLORECTAL CA SCREEN DOC REV: CPT | Performed by: NURSE PRACTITIONER

## 2019-02-04 ASSESSMENT — ENCOUNTER SYMPTOMS
VOMITING: 0
WHEEZING: 0
SHORTNESS OF BREATH: 0
NAUSEA: 0
COUGH: 0
COLOR CHANGE: 0

## 2019-02-04 ASSESSMENT — PATIENT HEALTH QUESTIONNAIRE - PHQ9
SUM OF ALL RESPONSES TO PHQ QUESTIONS 1-9: 0
SUM OF ALL RESPONSES TO PHQ QUESTIONS 1-9: 0
2. FEELING DOWN, DEPRESSED OR HOPELESS: 0
1. LITTLE INTEREST OR PLEASURE IN DOING THINGS: 0
SUM OF ALL RESPONSES TO PHQ9 QUESTIONS 1 & 2: 0

## 2019-02-20 ENCOUNTER — TELEPHONE (OUTPATIENT)
Dept: INTERNAL MEDICINE CLINIC | Age: 64
End: 2019-02-20

## 2019-04-18 ENCOUNTER — OFFICE VISIT (OUTPATIENT)
Dept: INTERNAL MEDICINE CLINIC | Age: 64
End: 2019-04-18
Payer: MEDICAID

## 2019-04-18 VITALS
HEART RATE: 73 BPM | HEIGHT: 64 IN | BODY MASS INDEX: 25.78 KG/M2 | WEIGHT: 151 LBS | OXYGEN SATURATION: 98 % | RESPIRATION RATE: 14 BRPM | SYSTOLIC BLOOD PRESSURE: 132 MMHG | DIASTOLIC BLOOD PRESSURE: 68 MMHG | TEMPERATURE: 98 F

## 2019-04-18 DIAGNOSIS — J45.20 MILD INTERMITTENT ASTHMA, UNSPECIFIED WHETHER COMPLICATED: ICD-10-CM

## 2019-04-18 DIAGNOSIS — J45.50 SEVERE PERSISTENT ASTHMA WITHOUT COMPLICATION: ICD-10-CM

## 2019-04-18 DIAGNOSIS — I47.1 SVT (SUPRAVENTRICULAR TACHYCARDIA) (HCC): ICD-10-CM

## 2019-04-18 DIAGNOSIS — M54.16 LUMBAR RADICULAR PAIN: Primary | ICD-10-CM

## 2019-04-18 DIAGNOSIS — C50.611 MALIGNANT NEOPLASM OF AXILLARY TAIL OF RIGHT FEMALE BREAST, UNSPECIFIED ESTROGEN RECEPTOR STATUS (HCC): ICD-10-CM

## 2019-04-18 DIAGNOSIS — I10 ESSENTIAL HYPERTENSION: ICD-10-CM

## 2019-04-18 PROCEDURE — 3017F COLORECTAL CA SCREEN DOC REV: CPT | Performed by: FAMILY MEDICINE

## 2019-04-18 PROCEDURE — 1036F TOBACCO NON-USER: CPT | Performed by: FAMILY MEDICINE

## 2019-04-18 PROCEDURE — G8427 DOCREV CUR MEDS BY ELIG CLIN: HCPCS | Performed by: FAMILY MEDICINE

## 2019-04-18 PROCEDURE — G8419 CALC BMI OUT NRM PARAM NOF/U: HCPCS | Performed by: FAMILY MEDICINE

## 2019-04-18 PROCEDURE — 99213 OFFICE O/P EST LOW 20 MIN: CPT | Performed by: FAMILY MEDICINE

## 2019-04-18 RX ORDER — PREDNISONE 10 MG/1
TABLET ORAL
Qty: 40 TABLET | Refills: 0 | Status: SHIPPED | OUTPATIENT
Start: 2019-04-18 | End: 2020-10-28

## 2019-04-18 ASSESSMENT — ENCOUNTER SYMPTOMS
RESPIRATORY NEGATIVE: 1
CHEST TIGHTNESS: 0
COUGH: 0
EYES NEGATIVE: 1
GASTROINTESTINAL NEGATIVE: 1
RHINORRHEA: 0

## 2019-04-18 NOTE — PROGRESS NOTES
Patient is seen in follow up for   Chief Complaint   Patient presents with    Hip Pain     Complaining of left hip pain , chonic pain- worsening in the last 6 2-3 months     Health Maintenance     declines vaccines     Edema     Discuss need for lympedema pump for her right arm. Compression stockings failed.  Other     GAP      Hip Pain    The incident occurred more than 1 week ago. There was no injury mechanism. The pain is present in the left leg and left hip. The quality of the pain is described as aching. The pain is at a severity of 4/10. The pain is moderate. The pain has been constant since onset. Pertinent negatives include no numbness. She reports no foreign bodies present. Nothing aggravates the symptoms. The treatment provided moderate relief. Other   Pertinent negatives include no congestion, coughing, fatigue, fever or numbness.        Past Medical History:   Diagnosis Date    Anxiety     Arthritis     Asthma     Cancer ([address] 4/2015    Right breast cancer metastatic to axillary node    Chronic back pain     Chronic cholecystitis with calculus 2/1/2018    Depression     History of blood transfusion     Hyperlipidemia     Hypertension     Multiple gallstones 10/3/2017    Prolonged emergence from general anesthesia     took 10-13 hours to wake up    SVT (supraventricular tachycardia) ([address]     Thyroid disease      Patient Active Problem List    Diagnosis Date Noted    Secondary and unspecified malignant neoplasm of axilla and upper limb lymph nodes ([address] 09/28/2018    Estrogen receptor negative status (ER-) 09/28/2018    Lobular carcinoma in situ of right breast 09/28/2018    Personal history of malignant neoplasm of breast 09/28/2018    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    Chronic cholecystitis with calculus 02/01/2018    Rib pain on right side 02/01/2018    Multiple gallstones 10/03/2017    Epigastric abdominal pain 09/30/2017    Lumbar spine painful on movement 09/30/2017    Degeneration, intervertebral disc, lumbar 09/30/2017    Malignant neoplasm of axillary 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 ([address] 09/14/2016    Hypothyroid 08/05/2014    Colon polyps 08/05/2014    SVT (supraventricular tachycardia) (HCC)     Hypertension     Hyperlipidemia     Asthma     Anxiety     Depression     Chronic back pain      Past Surgical History:   Procedure Laterality Date    BREAST BIOPSY Right 4/16/15    ULTRASOUND GUIDED BIOPSY RIGHT AXILLA, RIGHT AXILLARY DISSECTION,ULTRASOUND GUIDED RIGHT BREAST BIOPSY    CHOLECYSTECTOMY      COLONOSCOPY  9/12/14    polypectomy jarmoszuk    ND LAP,CHOLECYSTECTOMY/GRAPH N/A 4/13/2018    LAPAROSCOPIC CHOLECYSTECTOMY WITH GRAMS performed by Hi Meek MD at [address] W/SUBQ PORT/ CTR/PRPH INSJ N/A 2/21/2017    PORT REMOVAL performed by Therese Nguyen MD at [address]      tail bone    TONSILLECTOMY      TUBAL LIGATION      TUNNELED VENOUS PORT PLACEMENT Left 06/02/15    SUBCLAVIAN VEIN    WRIST GANGLION EXCISION Left      Family History   Problem Relation Age of Onset    Heart Disease Mother     Colon Cancer Father     Colon Polyps Father     Other Father 58        Post Op    Other Brother         Accidental Shooting    Brain Cancer Child      Social History     Socioeconomic History    Marital status:      Spouse name: None    Number of children: None    Years of education: None    Highest education level: None   Occupational History    None   Social Needs    Financial resource strain: None    Food insecurity:     Worry: None     Inability: None    Transportation needs:     Medical: None     Non-medical: None   Tobacco Use    Smoking status: Never Smoker    Smokeless tobacco: Never Used   Substance and Sexual Activity    Alcohol use: No    Drug use: No    Sexual activity: Not Currently   Lifestyle    Physical activity:     Days per week: None     Minutes per session: None    Stress: None   Relationships    Social connections:     Talks on phone: None     Gets together: None     Attends Moravian service: None     Active member of club or organization: None     Attends meetings of clubs or organizations: None     Relationship status: None    Intimate partner violence:     Fear of current or ex partner: None     Emotionally abused: None     Physically abused: None     Forced sexual activity: None   Other Topics Concern    None   Social History Narrative    None     Current Outpatient Medications   Medication Sig Dispense Refill    predniSONE (DELTASONE) 10 MG tablet 4 po for 4 days 3 po for 4 days 2 po for 4 days 1 po for 4 days 40 tablet 0    verapamil (CALAN SR) 180 MG extended release tablet take 1 tablet by mouth once daily 30 tablet 3    VENTOLIN  (90 Base) MCG/ACT inhaler inhale 2 puffs every 6 hours if needed for WHEEZING 18 g 3    Multiple Vitamin (MULTIVITAMINS PO) Take by mouth daily      sertraline (ZOLOFT) 100 MG tablet take 1 tablet by mouth once daily 90 tablet 0    levothyroxine (SYNTHROID) 50 MCG tablet take 1 tablet by mouth once daily 30 tablet 3    pravastatin (PRAVACHOL) 20 MG tablet take 1 tablet by mouth every evening 90 tablet 1    pantoprazole (PROTONIX) 40 MG tablet take 1 tablet by mouth once daily 30 tablet 3    ondansetron (ZOFRAN ODT) 4 MG disintegrating tablet Take 1 tablet by mouth every 8 hours as needed for Nausea 20 tablet 0    LYMPHEDEMA PUMP 1 Device as needed (arm swelling) 1 Device 0     No current facility-administered medications for this visit.       Current Outpatient Medications on File Prior to Visit   Medication Sig Dispense Refill    verapamil (CALAN SR) 180 MG extended release tablet take 1 tablet by mouth once daily 30 tablet 3    VENTOLIN  (90 Base) MCG/ACT inhaler inhale 2 puffs every 6 hours if needed for WHEEZING 18 g 3    Multiple Vitamin (MULTIVITAMINS PO) Take by mouth daily      sertraline (ZOLOFT) 100 MG tablet take 1 tablet by mouth once daily 90 tablet 0    levothyroxine (SYNTHROID) 50 MCG tablet take 1 tablet by mouth once daily 30 tablet 3    pravastatin (PRAVACHOL) 20 MG tablet take 1 tablet by mouth every evening 90 tablet 1    pantoprazole (PROTONIX) 40 MG tablet take 1 tablet by mouth once daily 30 tablet 3    ondansetron (ZOFRAN ODT) 4 MG disintegrating tablet Take 1 tablet by mouth every 8 hours as needed for Nausea 20 tablet 0    LYMPHEDEMA PUMP 1 Device as needed (arm swelling) 1 Device 0     No current facility-administered medications on file prior to visit. Allergies   Allergen Reactions    Latex Rash     Tape, bandaids    Codeine Anaphylaxis    Oxycodone Nausea And Vomiting     Dose-related, 1/2 tab was okay.  Pcn [Penicillins] Rash     As a child    Tape [Adhesive Tape] Rash    Tramadol Itching     Health Maintenance   Topic Date Due    Shingles Vaccine (1 of 2) 08/16/2005    Pneumococcal 0-64 years Vaccine (2 of 3 - PPSV23) 02/13/2019    DTaP/Tdap/Td vaccine (1 - Tdap) 12/19/2019 (Originally 8/16/1974)    Flu vaccine (Season Ended) 12/19/2019 (Originally 9/1/2019)    Hepatitis C screen  12/19/2019 (Originally 1955)    HIV screen  12/19/2019 (Originally 8/16/1970)    Colon cancer screen colonoscopy  09/12/2019    Cervical cancer screen  09/14/2019    Breast cancer screen  10/16/2019    TSH testing  01/16/2020    Lipid screen  03/19/2020       Review of Systems     Review of Systems   Constitutional: Negative for activity change, appetite change, fatigue and fever. HENT: Negative for congestion and rhinorrhea. Eyes: Negative. Respiratory: Negative. Negative for cough and chest tightness. Cardiovascular: Negative. Gastrointestinal: Negative. Endocrine: Negative. Genitourinary: Negative. Musculoskeletal: Negative. Skin: Negative. Neurological: Negative for dizziness, light-headedness and numbness. Hematological: Negative. Psychiatric/Behavioral: Negative. Physical Exam  Vitals:    04/18/19 1028   BP: 132/68   Site: Left Upper Arm   Position: Sitting   Cuff Size: Large Adult   Pulse: 73   Resp: 14   Temp: 98 °F (36.7 °C)   TempSrc: Oral   SpO2: 98%   Weight: 151 lb (68.5 kg)   Height: 5' 4\" (1.626 m)       Physical Exam   Constitutional: She appears well-developed and well-nourished. HENT:   Right Ear: External ear normal.   Left Ear: External ear normal.   Eyes: Pupils are equal, round, and reactive to light. Conjunctivae are normal.   Neck: Normal range of motion. Neck supple. No thyromegaly present. Cardiovascular: Normal rate, regular rhythm and normal heart sounds. No murmur heard. Pulmonary/Chest: Effort normal and breath sounds normal.   Abdominal: Soft. Bowel sounds are normal. She exhibits no distension. There is no tenderness. Musculoskeletal: She exhibits no edema. Lumbar back: She exhibits decreased range of motion, tenderness and pain. Back:    Lymphadenopathy:     She has no cervical adenopathy. Neurological: She is alert. No cranial nerve deficit. Coordination normal.       Assessment   Diagnosis Orders   1. Lumbar radicular pain  Ambulatory referral to Pain Clinic    XR LUMBAR SPINE (MIN 4 VIEWS)   2. Severe persistent asthma without complication     3. SVT (supraventricular tachycardia) ([address]     4. Mild intermittent asthma, unspecified whether complicated     5. Essential hypertension     6.  Malignant neoplasm of axillary 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, unspecified estrogen receptor status ([address]       Problem List     Hypertension    Relevant Medications    aspirin tablet 325 mg (Completed)    nitroGLYCERIN (NITROSTAT) SL tablet 0.4 mg (Completed)    pravastatin (PRAVACHOL) 20 MG tablet    verapamil (CALAN SR) 180 MG extended release tablet    Asthma    Relevant Medications    VENTOLIN  (90 Base) MCG/ACT inhaler    SVT (supraventricular tachycardia) ([facility]    Relevant Medications    aspirin tablet 325 mg (Completed)    nitroGLYCERIN (NITROSTAT) SL tablet 0.4 mg (Completed)    pravastatin (PRAVACHOL) 20 MG tablet    verapamil (CALAN SR) 180 MG extended release tablet    Malignant neoplasm of axillary 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 (HCC)    Relevant Medications    methylPREDNISolone acetate (DEPO-MEDROL) injection 80 mg (Completed)    morphine (PF) injection 4 mg (Completed)    aspirin tablet 325 mg (Completed)    LORazepam (ATIVAN) injection 1 mg (Completed)    ketorolac (TORADOL) injection 30 mg (Completed)    ketorolac (TORADOL) injection 30 mg (Completed)    ketorolac (TORADOL) injection 30 mg (Completed)    predniSONE (DELTASONE) 10 MG tablet          Plan  Orders Placed This Encounter   Procedures    XR LUMBAR SPINE (MIN 4 VIEWS)     Standing Status:   Future     Standing Expiration Date:   2020     Order Specific Question:   Reason for exam:     Answer:   left side radiculopathy    Ambulatory referral to Pain Clinic     Referral Priority:   Routine     Referral Type:   Eval and Treat     Referral Reason:   Specialty Services Required     Referred to Provider:   Jojo Myles DO     Requested Specialty:   Pain Medicine     Number of Visits Requested:   1     Orders Placed This Encounter   Medications    predniSONE (DELTASONE) 10 MG tablet     Si po for 4 days 3 po for 4 days 2 po for 4 days 1 po for 4 days     Dispense:  40 tablet     Refill:  0     No follow-ups on file.   Jad Valenzuela MD

## 2019-05-13 NOTE — TELEPHONE ENCOUNTER
requesting medication refill.      Rx requested:  Requested Prescriptions     Pending Prescriptions Disp Refills    VENTOLIN  (90 Base) MCG/ACT inhaler [Pharmacy Med Name: VENTOLIN HFA 90 MCG INHALER] 18 g 3     Sig: inhale 2 puffs by mouth every 6 hours if needed for wheezing       Last Office Visit:   4/18/2019        Next Visit Date:  Future Appointments   Date Time Provider Sumaya Villalobos   5/14/2019  1:40 PM Violette De Leon DO [facility]

## 2019-05-14 DIAGNOSIS — C50.611 MALIGNANT NEOPLASM OF AXILLARY TAIL OF RIGHT FEMALE BREAST, UNSPECIFIED ESTROGEN RECEPTOR STATUS (HCC): ICD-10-CM

## 2019-05-14 LAB
ALBUMIN SERPL-MCNC: 4 G/DL (ref 3.5–4.6)
ALP BLD-CCNC: 86 U/L (ref 40–130)
ALT SERPL-CCNC: 15 U/L (ref 0–33)
ANION GAP SERPL CALCULATED.3IONS-SCNC: 16 MEQ/L (ref 9–15)
AST SERPL-CCNC: 23 U/L (ref 0–35)
BILIRUB SERPL-MCNC: 0.3 MG/DL (ref 0.2–0.7)
BUN BLDV-MCNC: 12 MG/DL (ref 8–23)
CALCIUM SERPL-MCNC: 9.1 MG/DL (ref 8.5–9.9)
CHLORIDE BLD-SCNC: 102 MEQ/L (ref 95–107)
CO2: 22 MEQ/L (ref 20–31)
CREAT SERPL-MCNC: 0.56 MG/DL (ref 0.5–0.9)
GFR AFRICAN AMERICAN: >60
GFR NON-AFRICAN AMERICAN: >60
GLOBULIN: 2.9 G/DL (ref 2.3–3.5)
GLUCOSE BLD-MCNC: 110 MG/DL (ref 70–99)
POTASSIUM SERPL-SCNC: 3.9 MEQ/L (ref 3.4–4.9)
SODIUM BLD-SCNC: 140 MEQ/L (ref 135–144)
TOTAL PROTEIN: 6.9 G/DL (ref 6.3–8)

## 2019-06-10 NOTE — TELEPHONE ENCOUNTER
Requesting medication refill.  Please approve or deny this request.    Rx requested:  Requested Prescriptions     Pending Prescriptions Disp Refills    verapamil (CALAN SR) 180 MG extended release tablet [Pharmacy Med Name: VERAPAMIL  MG TAB (12HR)] 30 tablet 2     Sig: take 1 tablet by mouth once daily       Last Office Visit:   4/18/2019    Last Labs:      Next Visit Date:  Future Appointments   Date Time Provider Sumaya Villalobos   11/18/2019  1:00 PM Mauricio De Leon DO [facility] [address]

## 2019-06-21 ENCOUNTER — HOSPITAL ENCOUNTER (EMERGENCY)
Age: 64
Discharge: HOME | End: 2019-06-21
Attending: EMERGENCY MEDICINE
Payer: MEDICAID

## 2019-06-21 ENCOUNTER — APPOINTMENT (OUTPATIENT)
Dept: CT IMAGING | Age: 64
End: 2019-06-21
Payer: MEDICAID

## 2019-06-21 ENCOUNTER — APPOINTMENT (OUTPATIENT)
Dept: GENERAL RADIOLOGY | Age: 64
End: 2019-06-21
Payer: MEDICAID

## 2019-06-21 ENCOUNTER — OFFICE VISIT (OUTPATIENT)
Dept: FAMILY MEDICINE CLINIC | Age: 64
End: 2019-06-21
Payer: MEDICAID

## 2019-06-21 VITALS
OXYGEN SATURATION: 99 % | WEIGHT: 150.8 LBS | SYSTOLIC BLOOD PRESSURE: 116 MMHG | TEMPERATURE: 97.7 F | HEART RATE: 71 BPM | DIASTOLIC BLOOD PRESSURE: 70 MMHG | HEIGHT: 64 IN | BODY MASS INDEX: 25.74 KG/M2

## 2019-06-21 VITALS
TEMPERATURE: 97.8 F | HEIGHT: 64 IN | WEIGHT: 146 LBS | BODY MASS INDEX: 24.92 KG/M2 | RESPIRATION RATE: 18 BRPM | SYSTOLIC BLOOD PRESSURE: 167 MMHG | HEART RATE: 72 BPM | DIASTOLIC BLOOD PRESSURE: 75 MMHG | OXYGEN SATURATION: 97 %

## 2019-06-21 DIAGNOSIS — R42 VERTIGO: Primary | ICD-10-CM

## 2019-06-21 DIAGNOSIS — R26.81 UNSTEADY GAIT: ICD-10-CM

## 2019-06-21 DIAGNOSIS — R51.9 NONINTRACTABLE HEADACHE, UNSPECIFIED CHRONICITY PATTERN, UNSPECIFIED HEADACHE TYPE: ICD-10-CM

## 2019-06-21 DIAGNOSIS — R42 DIZZINESS: Primary | ICD-10-CM

## 2019-06-21 LAB
ALBUMIN SERPL-MCNC: 4.2 G/DL (ref 3.5–4.6)
ALP BLD-CCNC: 78 U/L (ref 40–130)
ALT SERPL-CCNC: 14 U/L (ref 0–33)
ANION GAP SERPL CALCULATED.3IONS-SCNC: 15 MEQ/L (ref 9–15)
AST SERPL-CCNC: 18 U/L (ref 0–35)
BASOPHILS ABSOLUTE: 0 K/UL (ref 0–0.2)
BASOPHILS RELATIVE PERCENT: 0.6 %
BILIRUB SERPL-MCNC: <0.2 MG/DL (ref 0.2–0.7)
BUN BLDV-MCNC: 22 MG/DL (ref 8–23)
CALCIUM SERPL-MCNC: 9.4 MG/DL (ref 8.5–9.9)
CHLORIDE BLD-SCNC: 106 MEQ/L (ref 95–107)
CO2: 21 MEQ/L (ref 20–31)
CREAT SERPL-MCNC: 0.55 MG/DL (ref 0.5–0.9)
EOSINOPHILS ABSOLUTE: 0.1 K/UL (ref 0–0.7)
EOSINOPHILS RELATIVE PERCENT: 1.6 %
GFR AFRICAN AMERICAN: >60
GFR NON-AFRICAN AMERICAN: >60
GLOBULIN: 2.5 G/DL (ref 2.3–3.5)
GLUCOSE BLD-MCNC: 105 MG/DL (ref 70–99)
HCT VFR BLD CALC: 39.3 % (ref 37–47)
HEMOGLOBIN: 14 G/DL (ref 12–16)
INR BLD: 1
LYMPHOCYTES ABSOLUTE: 1.2 K/UL (ref 1–4.8)
LYMPHOCYTES RELATIVE PERCENT: 32.4 %
MCH RBC QN AUTO: 32.5 PG (ref 27–31.3)
MCHC RBC AUTO-ENTMCNC: 35.7 % (ref 33–37)
MCV RBC AUTO: 90.9 FL (ref 82–100)
MONOCYTES ABSOLUTE: 0.6 K/UL (ref 0.2–0.8)
MONOCYTES RELATIVE PERCENT: 15.9 %
NEUTROPHILS ABSOLUTE: 1.8 K/UL (ref 1.4–6.5)
NEUTROPHILS RELATIVE PERCENT: 49.5 %
PDW BLD-RTO: 12.7 % (ref 11.5–14.5)
PLATELET # BLD: 200 K/UL (ref 130–400)
PLATELET SLIDE REVIEW: ADEQUATE
POTASSIUM SERPL-SCNC: 3.6 MEQ/L (ref 3.4–4.9)
PROTHROMBIN TIME: 10.3 SEC (ref 9–11.5)
RBC # BLD: 4.32 M/UL (ref 4.2–5.4)
SLIDE REVIEW: ABNORMAL
SODIUM BLD-SCNC: 142 MEQ/L (ref 135–144)
TOTAL PROTEIN: 6.7 G/DL (ref 6.3–8)
WBC # BLD: 3.7 K/UL (ref 4.8–10.8)

## 2019-06-21 PROCEDURE — 6370000000 HC RX 637 (ALT 250 FOR IP): Performed by: EMERGENCY MEDICINE

## 2019-06-21 PROCEDURE — 80053 COMPREHEN METABOLIC PANEL: CPT

## 2019-06-21 PROCEDURE — 71046 X-RAY EXAM CHEST 2 VIEWS: CPT

## 2019-06-21 PROCEDURE — 6360000002 HC RX W HCPCS: Performed by: EMERGENCY MEDICINE

## 2019-06-21 PROCEDURE — 99284 EMERGENCY DEPT VISIT MOD MDM: CPT

## 2019-06-21 PROCEDURE — 96372 THER/PROPH/DIAG INJ SC/IM: CPT

## 2019-06-21 PROCEDURE — 36415 COLL VENOUS BLD VENIPUNCTURE: CPT

## 2019-06-21 PROCEDURE — 70450 CT HEAD/BRAIN W/O DYE: CPT

## 2019-06-21 PROCEDURE — 99213 OFFICE O/P EST LOW 20 MIN: CPT | Performed by: NURSE PRACTITIONER

## 2019-06-21 PROCEDURE — 85610 PROTHROMBIN TIME: CPT

## 2019-06-21 PROCEDURE — 85025 COMPLETE CBC W/AUTO DIFF WBC: CPT

## 2019-06-21 RX ORDER — METOCLOPRAMIDE HYDROCHLORIDE 5 MG/ML
10 INJECTION INTRAMUSCULAR; INTRAVENOUS ONCE
Status: DISCONTINUED | OUTPATIENT
Start: 2019-06-21 | End: 2019-06-21

## 2019-06-21 RX ORDER — KETOROLAC TROMETHAMINE 30 MG/ML
60 INJECTION, SOLUTION INTRAMUSCULAR; INTRAVENOUS ONCE
Status: COMPLETED | OUTPATIENT
Start: 2019-06-21 | End: 2019-06-21

## 2019-06-21 RX ORDER — MECLIZINE HYDROCHLORIDE 25 MG/1
25 TABLET ORAL ONCE
Status: COMPLETED | OUTPATIENT
Start: 2019-06-21 | End: 2019-06-21

## 2019-06-21 RX ORDER — DIPHENHYDRAMINE HYDROCHLORIDE 50 MG/ML
12.5 INJECTION INTRAMUSCULAR; INTRAVENOUS ONCE
Status: DISCONTINUED | OUTPATIENT
Start: 2019-06-21 | End: 2019-06-21

## 2019-06-21 RX ORDER — KETOROLAC TROMETHAMINE 30 MG/ML
30 INJECTION, SOLUTION INTRAMUSCULAR; INTRAVENOUS ONCE
Status: DISCONTINUED | OUTPATIENT
Start: 2019-06-21 | End: 2019-06-21

## 2019-06-21 RX ORDER — MECLIZINE HYDROCHLORIDE 25 MG/1
25 TABLET ORAL 3 TIMES DAILY PRN
Qty: 30 TABLET | Refills: 0 | Status: SHIPPED | OUTPATIENT
Start: 2019-06-21 | End: 2019-07-01

## 2019-06-21 RX ADMIN — KETOROLAC TROMETHAMINE 60 MG: 30 INJECTION, SOLUTION INTRAMUSCULAR at 20:48

## 2019-06-21 RX ADMIN — MECLIZINE HYDROCHLORIDE 25 MG: 25 TABLET ORAL at 18:15

## 2019-06-21 ASSESSMENT — ENCOUNTER SYMPTOMS
COUGH: 0
ABDOMINAL PAIN: 0
NAUSEA: 0
DIARRHEA: 0
VOMITING: 0
SHORTNESS OF BREATH: 0
EYE PAIN: 0
SORE THROAT: 0
VOMITING: 1
ABDOMINAL PAIN: 0
TROUBLE SWALLOWING: 0
NAUSEA: 1
COLOR CHANGE: 0
SORE THROAT: 0
CHEST TIGHTNESS: 0
SHORTNESS OF BREATH: 0
CONSTIPATION: 0
VOICE CHANGE: 0
BACK PAIN: 0

## 2019-06-21 ASSESSMENT — PAIN SCALES - GENERAL
PAINLEVEL_OUTOF10: 9
PAINLEVEL_OUTOF10: 9

## 2019-06-21 ASSESSMENT — PAIN DESCRIPTION - LOCATION: LOCATION: HEAD

## 2019-06-21 NOTE — PROGRESS NOTES
Wayne Burrell, 61 y.o. female presents today with:  Chief Complaint   Patient presents with    Dizziness     pt has been dizzie past 3 days when laying down feels like the whole room is spinning, pt vomitted a yellow unknown substance. HPI     Presents to the [facility] today with complaints of dizziness for the past three days with unsteady gait, noting she has been shuffling to the left when walking and not being able to get her footing right initially upon standing up. She notes that she cannot lay down due to the dizziness because it makes it worse. She notes headache over the past three days however denies thunderclap headache or this being the worse headache she has ever had. The dizziness makes her nauseated and she has thrown up bile today and yesterday. She notes blurred vision intermittently. She denies any head injury or trauma. She denies any chest pain, shortness of breath, fever, recent travel, sick contacts, diarrhea, abdominal pain or urinary symptoms. Review of Systems   Constitutional: Negative for appetite change and fever. HENT: Negative for drooling, ear pain, sore throat, trouble swallowing and voice change. Respiratory: Negative for cough and shortness of breath. Cardiovascular: Negative for chest pain. Gastrointestinal: Positive for nausea and vomiting. Negative for abdominal pain, constipation and diarrhea. Genitourinary: Negative for decreased urine volume and dysuria. Musculoskeletal: Negative for arthralgias and back pain. Skin: Negative for color change. Neurological: Positive for dizziness and headaches. Negative for weakness and light-headedness. Psychiatric/Behavioral: Negative for agitation and behavioral problems. All other systems reviewed and are negative.       Past Medical History:   Diagnosis Date    Anxiety     Arthritis     Asthma     Cancer ([facility] 4/2015    Right breast cancer metastatic to axillary node    Chronic back pain     Chronic cholecystitis with calculus 2/1/2018    Depression     History of blood transfusion     Hyperlipidemia     Hypertension     Multiple gallstones 10/3/2017    Prolonged emergence from general anesthesia     took 10-13 hours to wake up    SVT (supraventricular tachycardia) [facility]     Thyroid disease      Past Surgical History:   Procedure Laterality Date    BREAST BIOPSY Right 4/16/15    ULTRASOUND GUIDED BIOPSY RIGHT AXILLA, RIGHT AXILLARY DISSECTION,ULTRASOUND GUIDED RIGHT BREAST BIOPSY    CHOLECYSTECTOMY      COLONOSCOPY  9/12/14    polypectomy jarmoszuk    RI LAP,CHOLECYSTECTOMY/GRAPH N/A 4/13/2018    LAPAROSCOPIC CHOLECYSTECTOMY WITH GRAMS performed by Mannie Smith MD at [address] W/SUBQ PORT/ CTR/PRPH INSJ N/A 2/21/2017    PORT REMOVAL performed by Salma Rodriguez MD at [address]      tail bone    TONSILLECTOMY      TUBAL LIGATION      TUNNELED VENOUS PORT PLACEMENT Left 06/02/15    SUBCLAVIAN VEIN    WRIST GANGLION EXCISION Left      Social History     Socioeconomic History    Marital status:      Spouse name: Not on file    Number of children: Not on file    Years of education: Not on file    Highest education level: Not on file   Occupational History    Not on file   Social Needs    Financial resource strain: Not on file    Food insecurity:     Worry: Not on file     Inability: Not on file    Transportation needs:     Medical: Not on file     Non-medical: Not on file   Tobacco Use    Smoking status: Never Smoker    Smokeless tobacco: Never Used   Substance and Sexual Activity    Alcohol use: No    Drug use: No    Sexual activity: Not Currently   Lifestyle    Physical activity:     Days per week: Not on file     Minutes per session: Not on file    Stress: Not on file   Relationships    Social connections:     Talks on phone: Not on file     Gets together: Not on file     Attends Alevism service: Not on file     Active member of club or organization: Not on file     Attends meetings of clubs or organizations: Not on file     Relationship status: Not on file    Intimate partner violence:     Fear of current or ex partner: Not on file     Emotionally abused: Not on file     Physically abused: Not on file     Forced sexual activity: Not on file   Other Topics Concern    Not on file   Social History Narrative    Not on file     Family History   Problem Relation Age of Onset    Heart Disease Mother     Colon Cancer Father     Colon Polyps Father     Other Father 58        Post Op    Other Brother         Accidental Shooting    Brain Cancer Child      Allergies   Allergen Reactions    Latex Rash     Tape, bandaids    Codeine Anaphylaxis    Oxycodone Nausea And Vomiting     Dose-related, 1/2 tab was okay.     Pcn [Penicillins] Rash     As a child    Tape Mily Barter Tape] Rash    Tramadol Itching     Current Outpatient Medications   Medication Sig Dispense Refill    verapamil (CALAN SR) 180 MG extended release tablet take 1 tablet by mouth once daily 30 tablet 2    VENTOLIN  (90 Base) MCG/ACT inhaler inhale 2 puffs by mouth every 6 hours if needed for wheezing 18 g 3    predniSONE (DELTASONE) 10 MG tablet 4 po for 4 days 3 po for 4 days 2 po for 4 days 1 po for 4 days 40 tablet 0    LYMPHEDEMA PUMP 1 Device as needed (arm swelling) 1 Device 0    Multiple Vitamin (MULTIVITAMINS PO) Take by mouth daily      sertraline (ZOLOFT) 100 MG tablet take 1 tablet by mouth once daily 90 tablet 0    levothyroxine (SYNTHROID) 50 MCG tablet take 1 tablet by mouth once daily 30 tablet 3    pravastatin (PRAVACHOL) 20 MG tablet take 1 tablet by mouth every evening 90 tablet 1    pantoprazole (PROTONIX) 40 MG tablet take 1 tablet by mouth once daily 30 tablet 3    ondansetron (ZOFRAN ODT) 4 MG disintegrating tablet Take 1 tablet by mouth every 8 hours as needed for Nausea 20 tablet 0     No current facility-administered medications for this visit. PMH, Surgical Hx, Family Hx, and Social Hx reviewed and updated. Health Maintenance reviewed. Objective  Vitals:    06/21/19 1723   BP: 116/70   Site: Left Upper Arm   Position: Sitting   Cuff Size: Large Adult   Pulse: 71   Temp: 97.7 °F (36.5 °C)   TempSrc: Tympanic   SpO2: 99%   Weight: 150 lb 12.8 oz (68.4 kg)   Height: 5' 4\" (1.626 m)     BP Readings from Last 3 Encounters:   06/21/19 116/70   05/14/19 133/73   04/18/19 132/68     Wt Readings from Last 3 Encounters:   06/21/19 150 lb 12.8 oz (68.4 kg)   05/14/19 150 lb 3.2 oz (68.1 kg)   04/18/19 151 lb (68.5 kg)     Physical Exam   Constitutional: She is oriented to person, place, and time. She appears well-developed and well-nourished. No distress. HENT:   Head: Normocephalic and atraumatic. Right Ear: External ear normal.   Left Ear: External ear normal.   Nose: Nose normal.   Mouth/Throat: Oropharynx is clear and moist. No oropharyngeal exudate. Eyes: Pupils are equal, round, and reactive to light. Conjunctivae are normal. Right eye exhibits no discharge. Left eye exhibits no discharge. Neck: Normal range of motion. Neck supple. No JVD present. No tracheal deviation present. Cardiovascular: Normal rate and regular rhythm. Pulmonary/Chest: Effort normal and breath sounds normal. No stridor. No respiratory distress. She has no wheezes. She has no rales. She exhibits no tenderness. Abdominal: Soft. Bowel sounds are normal. She exhibits no distension and no mass. There is no tenderness. There is no rebound and no guarding. No hernia. Musculoskeletal: Normal range of motion. Lymphadenopathy:     She has no cervical adenopathy. Neurological: She is alert and oriented to person, place, and time. She has normal strength. She displays no atrophy and no tremor. She exhibits normal muscle tone. She displays no seizure activity. Gait abnormal. Coordination normal. GCS eye subscore is 4. GCS verbal subscore is 5. GCS motor subscore is 6. Patient is alert and oriented x 3. She is not able to walk in a straight line and shuffles to the left. There is no foot drop noted. Skin: Skin is warm and dry. Capillary refill takes less than 2 seconds. Psychiatric: She has a normal mood and affect. Nursing note and vitals reviewed. Assessment & Plan    Diagnosis Orders   1. Dizziness     2. Unsteady gait       No orders of the defined types were placed in this encounter. No orders of the defined types were placed in this encounter. There are no discontinued medications. Return for Go directly to the ER after visit. PROCEDURES:  Unless otherwise noted below, none     Procedures            Reviewed with the patient: current clinical status,medications, activities and diet. Patient presents to the [address] [place] [address] with the above noted complaint. Patient is non-toxic and vital signs are normal.     Discussed treatment options with Karolina MADISON. Due to the severity of dizziness and unsteady gait, she was agreeable for ER evaluation with possible STAT imaging and lab work which is not available through the [address] [place] [address]. Nena Greene was transported to [facility] by significant other in personal car. Chente MADISON was stable upon leaving the [address] [place] [address] I have informed [facility] of the patient's arrival and spoke to the attending physician. Patient will be discharged ER in stable condition. I did not provided her with any prescriptions at discharge. Side effects, adverse effects of the medication prescribed today, as well as treatment plan/ rationale and result expectations have been discussed with the patient who expresses understanding and has no further questions. I have provided the patient with anticipatory guidance and have instructed on follow up and to return if not better or any new or worsening symptoms.  Patient has been instructed on when they should go to the ER or call 911 if their symptoms become worse. Patient demonstrates understanding and has no further questions. Close follow up to evaluate treatment results and for coordination of care. I have reviewed the patient's medical history in detail and updated the computerized patient record.       Baldev Nevarez, APRN - CNP

## 2019-06-21 NOTE — ED NOTES
Pt up to restroom w/ assistance by this RN. Pt is unstable w/out assistance at this time. Will continue to monitor.      Zahra Herr RN  06/21/19 3822

## 2019-06-21 NOTE — ED TRIAGE NOTES
Pt to ER with c/o dizziness x 3-4 days and headache today, pt states it feels like everything is spinning around her, pt states pain in head 9/10, pt a&ox4, resp even and unlabored

## 2019-06-21 NOTE — ED PROVIDER NOTES
Arthritis     Asthma     Cancer ([facility] [address] 4/2015    Right breast cancer metastatic to axillary node    Chronic back pain     Chronic cholecystitis with calculus 2/1/2018    Depression     History of blood transfusion     Hyperlipidemia     Hypertension     Multiple gallstones 10/3/2017    Prolonged emergence from general anesthesia     took 10-13 hours to wake up    SVT (supraventricular tachycardia) ([facility] [address]     Thyroid disease          SURGICALHISTORY       Past Surgical History:   Procedure Laterality Date    BREAST BIOPSY Right 4/16/15    ULTRASOUND GUIDED BIOPSY RIGHT AXILLA, RIGHT AXILLARY DISSECTION,ULTRASOUND GUIDED RIGHT BREAST BIOPSY    CHOLECYSTECTOMY      COLONOSCOPY  9/12/14    polypectomy jarmoszuk    [place] LAP,CHOLECYSTECTOMY/GRAPH N/A 4/13/2018    LAPAROSCOPIC CHOLECYSTECTOMY WITH GRAMS performed by Karen Shaw MD at [address] [facility] W/SUBQ PORT/ CTR/PRPH INSJ N/A 2/21/2017    PORT REMOVAL performed by Ifrah Massey MD at [address] [place]      tail bone    TONSILLECTOMY      TUBAL LIGATION      TUNNELED VENOUS PORT PLACEMENT Left 06/02/15    SUBCLAVIAN VEIN    WRIST GANGLION EXCISION Left          CURRENT MEDICATIONS       Previous Medications    LEVOTHYROXINE (SYNTHROID) 50 MCG TABLET    take 1 tablet by mouth once daily    LYMPHEDEMA PUMP    1 Device as needed (arm swelling)    MULTIPLE VITAMIN (MULTIVITAMINS PO)    Take by mouth daily    ONDANSETRON (ZOFRAN ODT) 4 MG DISINTEGRATING TABLET    Take 1 tablet by mouth every 8 hours as needed for Nausea    PANTOPRAZOLE (PROTONIX) 40 MG TABLET    take 1 tablet by mouth once daily    PRAVASTATIN (PRAVACHOL) 20 MG TABLET    take 1 tablet by mouth every evening    PREDNISONE (DELTASONE) 10 MG TABLET    4 po for 4 days 3 po for 4 days 2 po for 4 days 1 po for 4 days    SERTRALINE (ZOLOFT) 100 MG TABLET    take 1 tablet by mouth once daily    VENTOLIN  (90 BASE) MCG/ACT INHALER    inhale 2 puffs by mouth every 6 hours if needed for wheezing    VERAPAMIL (CALAN SR) 180 MG EXTENDED RELEASE TABLET    take 1 tablet by mouth once daily       ALLERGIES     Latex; Codeine; Oxycodone; Pcn [penicillins];  Tape Garry Ina tape]; and Tramadol    FAMILY HISTORY       Family History   Problem Relation Age of Onset    Heart Disease Mother     Colon Cancer Father     Colon Polyps Father     Other Father 58        Post Op    Other Brother         Accidental Shooting    Brain Cancer Child           SOCIAL HISTORY       Social History     Socioeconomic History    Marital status:      Spouse name: None    Number of children: None    Years of education: None    Highest education level: None   Occupational History    None   Social Needs    Financial resource strain: None    Food insecurity:     Worry: None     Inability: None    Transportation needs:     Medical: None     Non-medical: None   Tobacco Use    Smoking status: Never Smoker    Smokeless tobacco: Never Used   Substance and Sexual Activity    Alcohol use: No    Drug use: No    Sexual activity: Not Currently   Lifestyle    Physical activity:     Days per week: None     Minutes per session: None    Stress: None   Relationships    Social connections:     Talks on phone: None     Gets together: None     Attends Alevism service: None     Active member of club or organization: None     Attends meetings of clubs or organizations: None     Relationship status: None    Intimate partner violence:     Fear of current or ex partner: None     Emotionally abused: None     Physically abused: None     Forced sexual activity: None   Other Topics Concern    None   Social History Narrative    None       SCREENINGS              PHYSICAL EXAM    (up to 7 for level 4, 8 or more for level 5)     ED Triage Vitals [06/21/19 1755]   BP Temp Temp Source Pulse Resp SpO2 Height Weight   (!) 167/75 97.8 °F (36.6 °C) Oral 72 18 97 % 5' 4\" (1.626 m) 146 lb (66.2 kg)       Physical Exam   Constitutional: She is oriented to person, place, and time. She appears well-developed and well-nourished. No distress. HENT:   Head: Normocephalic and atraumatic. Right Ear: External ear normal.   Left Ear: External ear normal.   Mouth/Throat: Oropharynx is clear and moist. No oropharyngeal exudate. Eyes: Pupils are equal, round, and reactive to light. Conjunctivae are normal.   No nystagmus   Neck: Normal range of motion. Neck supple. No JVD present. No tracheal deviation present. No thyromegaly present. Cardiovascular: Normal rate and normal heart sounds. No murmur heard. Pulmonary/Chest: Effort normal and breath sounds normal. No respiratory distress. She has no wheezes. Abdominal: Soft. Bowel sounds are normal. There is no tenderness. There is no guarding. Musculoskeletal: Normal range of motion. She exhibits no edema. Neurological: She is alert and oriented to person, place, and time. She displays normal reflexes. No cranial nerve deficit or sensory deficit. She exhibits normal muscle tone. Coordination normal.   Skin: Skin is warm and dry. No rash noted. She is not diaphoretic. Psychiatric: She has a normal mood and affect.  Her behavior is normal.       DIAGNOSTIC RESULTS     EKG: All EKG's are interpreted by the Emergency Department Physician who either signs or Co-signsthis chart in the absence of a cardiologist.         RADIOLOGY:   Krystle Celeste such as CT, Ultrasound and MRI are read by the radiologist. Zuri Thomas radiographic images are visualized and preliminarily interpreted by the emergency physician with the below findings:    CT brain negative for acute pathology  Test x-ray no acute pathology    Interpretation per the Radiologist below, if available at the time ofthis note:    CT Head WO Contrast    (Results Pending)   XR CHEST STANDARD (2 VW)    (Results Pending)         ED BEDSIDE ULTRASOUND:   Performed by ED Physician - none    LABS:  Labs Reviewed   CBC WITH AUTO DIFFERENTIAL - Abnormal; Notable for the following components:       Result Value    WBC 3.7 (*)     MCH 32.5 (*)     All other components within normal limits   COMPREHENSIVE METABOLIC PANEL   PROTIME-INR       All other labs were within normal range or not returned as of this dictation. EMERGENCY DEPARTMENT COURSE and DIFFERENTIAL DIAGNOSIS/MDM:   Vitals:    Vitals:    06/21/19 1755   BP: (!) 167/75   Pulse: 72   Resp: 18   Temp: 97.8 °F (36.6 °C)   TempSrc: Oral   SpO2: 97%   Weight: 146 lb (66.2 kg)   Height: 5' 4\" (1.626 m)       Patient came in with headache and vertigo type symptoms. CT of the brain was negative. Antivert seem to help as did Toradol for the headache. I will refer her to neurology and prescribed Antivert    MDM      REASSESSMENT          CRITICAL CARE TIME   Total Critical Care time was 0 minutes, excluding separatelyreportable procedures. There was a high probability ofclinically significant/life threatening deterioration in the patient's condition which required my urgent intervention. CONSULTS:  None    PROCEDURES:  Unless otherwise noted below, none     Procedures    FINAL IMPRESSION      1. Vertigo    2. Nonintractable headache, unspecified chronicity pattern, unspecified headache type          DISPOSITION/PLAN   DISPOSITION        PATIENT REFERREDTO:  Orlando Ventura MD  [address] [place] [address] [phone]      As needed, If symptoms worsen      DISCHARGEMEDICATIONS:  New Prescriptions    MECLIZINE (ANTIVERT) 25 MG TABLET    Take 1 tablet by mouth 3 times daily as needed for Dizziness     Controlled Substances Monitoring:     RX Monitoring 9/29/2017   Attestation The Prescription Monitoring Report for this patient was reviewed today.        (Please note that portions of this note were completed with a voice recognition program.  Efforts were made to edit the dictations but occasionally words are mis-transcribed.)    Sammie Muñoz, DO (electronically signed)  Attending Emergency Physician         Nanda Souza,   06/21/19 2120       Nanda Souza,   06/21/19 2120

## 2019-06-21 NOTE — ED NOTES
RN to bedside. Pt is resting peacefully. Resps are even and unlabored, skin p/w/d, and no acute distress at this time. Will continue to monitor.        Mary Ann Benavides RN  06/21/19 1935

## 2019-07-10 ENCOUNTER — OFFICE VISIT (OUTPATIENT)
Dept: FAMILY MEDICINE CLINIC | Age: 64
End: 2019-07-10
Payer: MEDICAID

## 2019-07-10 VITALS
WEIGHT: 148 LBS | DIASTOLIC BLOOD PRESSURE: 82 MMHG | HEIGHT: 64 IN | OXYGEN SATURATION: 98 % | HEART RATE: 64 BPM | TEMPERATURE: 98 F | SYSTOLIC BLOOD PRESSURE: 128 MMHG | BODY MASS INDEX: 25.27 KG/M2 | RESPIRATION RATE: 14 BRPM

## 2019-07-10 DIAGNOSIS — Z01.419 ENCOUNTER FOR WELL WOMAN EXAM WITH ROUTINE GYNECOLOGICAL EXAM: ICD-10-CM

## 2019-07-10 DIAGNOSIS — Z01.419 ENCOUNTER FOR WELL WOMAN EXAM WITH ROUTINE GYNECOLOGICAL EXAM: Primary | ICD-10-CM

## 2019-07-10 PROCEDURE — 99396 PREV VISIT EST AGE 40-64: CPT | Performed by: NURSE PRACTITIONER

## 2019-07-10 RX ORDER — SERTRALINE HYDROCHLORIDE 100 MG/1
TABLET, FILM COATED ORAL
Qty: 90 TABLET | Refills: 3 | Status: SHIPPED | OUTPATIENT
Start: 2019-07-10 | End: 2020-08-10

## 2019-07-10 ASSESSMENT — ENCOUNTER SYMPTOMS
GASTROINTESTINAL NEGATIVE: 1
RESPIRATORY NEGATIVE: 1
EYES NEGATIVE: 1

## 2019-09-03 RX ORDER — PRAVASTATIN SODIUM 20 MG
TABLET ORAL
Qty: 30 TABLET | Refills: 5 | Status: SHIPPED | OUTPATIENT
Start: 2019-09-03 | End: 2020-04-06

## 2019-09-16 RX ORDER — LEVOTHYROXINE SODIUM 0.05 MG/1
50 TABLET ORAL DAILY
Qty: 90 TABLET | Refills: 3 | Status: SHIPPED | OUTPATIENT
Start: 2019-09-16 | End: 2020-09-18 | Stop reason: SDUPTHER

## 2019-10-21 ENCOUNTER — HOSPITAL ENCOUNTER (OUTPATIENT)
Dept: WOMENS IMAGING | Age: 64
Discharge: HOME | End: 2019-10-23
Payer: MEDICAID

## 2019-10-21 DIAGNOSIS — C50.611 MALIGNANT NEOPLASM OF AXILLARY TAIL OF RIGHT FEMALE BREAST, UNSPECIFIED ESTROGEN RECEPTOR STATUS (HCC): ICD-10-CM

## 2019-10-21 DIAGNOSIS — C77.3 SECONDARY AND UNSPECIFIED MALIGNANT NEOPLASM OF AXILLA AND UPPER LIMB LYMPH NODES (HCC): ICD-10-CM

## 2019-10-21 PROCEDURE — G0279 TOMOSYNTHESIS, MAMMO: HCPCS

## 2019-11-05 RX ORDER — ALBUTEROL SULFATE 90 UG/1
AEROSOL, METERED RESPIRATORY (INHALATION)
Qty: 18 G | Refills: 3 | Status: SHIPPED | OUTPATIENT
Start: 2019-11-05 | End: 2020-08-10

## 2020-03-04 ENCOUNTER — OFFICE VISIT (OUTPATIENT)
Dept: FAMILY MEDICINE CLINIC | Age: 65
End: 2020-03-04
Payer: MEDICAID

## 2020-03-04 ENCOUNTER — APPOINTMENT (OUTPATIENT)
Dept: CT IMAGING | Age: 65
End: 2020-03-04
Payer: MEDICAID

## 2020-03-04 ENCOUNTER — HOSPITAL ENCOUNTER (EMERGENCY)
Age: 65
Discharge: HOME | End: 2020-03-04
Attending: EMERGENCY MEDICINE
Payer: MEDICAID

## 2020-03-04 VITALS
RESPIRATION RATE: 16 BRPM | TEMPERATURE: 98.1 F | BODY MASS INDEX: 26.46 KG/M2 | WEIGHT: 155 LBS | DIASTOLIC BLOOD PRESSURE: 81 MMHG | OXYGEN SATURATION: 97 % | HEIGHT: 64 IN | HEART RATE: 71 BPM | SYSTOLIC BLOOD PRESSURE: 147 MMHG

## 2020-03-04 VITALS
RESPIRATION RATE: 16 BRPM | HEART RATE: 73 BPM | SYSTOLIC BLOOD PRESSURE: 110 MMHG | HEIGHT: 64 IN | OXYGEN SATURATION: 98 % | WEIGHT: 155 LBS | BODY MASS INDEX: 26.46 KG/M2 | TEMPERATURE: 98.1 F | DIASTOLIC BLOOD PRESSURE: 70 MMHG

## 2020-03-04 LAB
ALBUMIN SERPL-MCNC: 4.5 G/DL (ref 3.5–4.6)
ALP BLD-CCNC: 88 U/L (ref 40–130)
ALT SERPL-CCNC: 16 U/L (ref 0–33)
ANION GAP SERPL CALCULATED.3IONS-SCNC: 16 MEQ/L (ref 9–15)
AST SERPL-CCNC: 19 U/L (ref 0–35)
BASOPHILS ABSOLUTE: 0 K/UL (ref 0–0.2)
BASOPHILS RELATIVE PERCENT: 0.8 %
BILIRUB SERPL-MCNC: <0.2 MG/DL (ref 0.2–0.7)
BUN BLDV-MCNC: 19 MG/DL (ref 8–23)
CALCIUM SERPL-MCNC: 9.7 MG/DL (ref 8.5–9.9)
CHLORIDE BLD-SCNC: 99 MEQ/L (ref 95–107)
CO2: 25 MEQ/L (ref 20–31)
CREAT SERPL-MCNC: 0.63 MG/DL (ref 0.5–0.9)
EOSINOPHILS ABSOLUTE: 0.1 K/UL (ref 0–0.7)
EOSINOPHILS RELATIVE PERCENT: 1.1 %
GFR AFRICAN AMERICAN: >60
GFR NON-AFRICAN AMERICAN: >60
GLOBULIN: 3.1 G/DL (ref 2.3–3.5)
GLUCOSE BLD-MCNC: 96 MG/DL (ref 70–99)
HCT VFR BLD CALC: 43.3 % (ref 37–47)
HEMOGLOBIN: 14.6 G/DL (ref 12–16)
INR BLD: 0.9
LYMPHOCYTES ABSOLUTE: 1.4 K/UL (ref 1–4.8)
LYMPHOCYTES RELATIVE PERCENT: 28.1 %
MCH RBC QN AUTO: 30.4 PG (ref 27–31.3)
MCHC RBC AUTO-ENTMCNC: 33.7 % (ref 33–37)
MCV RBC AUTO: 90.2 FL (ref 82–100)
MONOCYTES ABSOLUTE: 0.8 K/UL (ref 0.2–0.8)
MONOCYTES RELATIVE PERCENT: 15.2 %
NEUTROPHILS ABSOLUTE: 2.7 K/UL (ref 1.4–6.5)
NEUTROPHILS RELATIVE PERCENT: 54.8 %
PDW BLD-RTO: 12.3 % (ref 11.5–14.5)
PLATELET # BLD: 231 K/UL (ref 130–400)
POC CREATININE WHOLE BLOOD: 0.7
POTASSIUM SERPL-SCNC: 4.2 MEQ/L (ref 3.4–4.9)
PROTHROMBIN TIME: 12.5 SEC (ref 12.3–14.9)
RBC # BLD: 4.8 M/UL (ref 4.2–5.4)
SODIUM BLD-SCNC: 140 MEQ/L (ref 135–144)
TOTAL PROTEIN: 7.6 G/DL (ref 6.3–8)
WBC # BLD: 5 K/UL (ref 4.8–10.8)

## 2020-03-04 PROCEDURE — 99284 EMERGENCY DEPT VISIT MOD MDM: CPT

## 2020-03-04 PROCEDURE — 6360000002 HC RX W HCPCS: Performed by: EMERGENCY MEDICINE

## 2020-03-04 PROCEDURE — 80053 COMPREHEN METABOLIC PANEL: CPT

## 2020-03-04 PROCEDURE — 85025 COMPLETE CBC W/AUTO DIFF WBC: CPT

## 2020-03-04 PROCEDURE — 3017F COLORECTAL CA SCREEN DOC REV: CPT | Performed by: NURSE PRACTITIONER

## 2020-03-04 PROCEDURE — G8484 FLU IMMUNIZE NO ADMIN: HCPCS | Performed by: NURSE PRACTITIONER

## 2020-03-04 PROCEDURE — G8419 CALC BMI OUT NRM PARAM NOF/U: HCPCS | Performed by: NURSE PRACTITIONER

## 2020-03-04 PROCEDURE — 85610 PROTHROMBIN TIME: CPT

## 2020-03-04 PROCEDURE — 99214 OFFICE O/P EST MOD 30 MIN: CPT | Performed by: NURSE PRACTITIONER

## 2020-03-04 PROCEDURE — G8427 DOCREV CUR MEDS BY ELIG CLIN: HCPCS | Performed by: NURSE PRACTITIONER

## 2020-03-04 PROCEDURE — 71260 CT THORAX DX C+: CPT

## 2020-03-04 PROCEDURE — 36415 COLL VENOUS BLD VENIPUNCTURE: CPT

## 2020-03-04 PROCEDURE — 1036F TOBACCO NON-USER: CPT | Performed by: NURSE PRACTITIONER

## 2020-03-04 PROCEDURE — 6360000004 HC RX CONTRAST MEDICATION: Performed by: EMERGENCY MEDICINE

## 2020-03-04 PROCEDURE — 96374 THER/PROPH/DIAG INJ IV PUSH: CPT

## 2020-03-04 RX ORDER — SODIUM CHLORIDE 0.9 % (FLUSH) 0.9 %
10 SYRINGE (ML) INJECTION ONCE
Status: DISCONTINUED | OUTPATIENT
Start: 2020-03-04 | End: 2020-03-04 | Stop reason: HOSPADM

## 2020-03-04 RX ORDER — ACETAMINOPHEN AND CODEINE PHOSPHATE 60; 300 MG/1; MG/1
1 TABLET ORAL EVERY 4 HOURS PRN
Qty: 20 TABLET | Refills: 0 | Status: SHIPPED | OUTPATIENT
Start: 2020-03-04 | End: 2020-03-07

## 2020-03-04 RX ORDER — LIDOCAINE 50 MG/G
1 PATCH TOPICAL DAILY
Qty: 10 PATCH | Refills: 0 | Status: SHIPPED | OUTPATIENT
Start: 2020-03-04 | End: 2020-03-14

## 2020-03-04 RX ORDER — KETOROLAC TROMETHAMINE 30 MG/ML
30 INJECTION, SOLUTION INTRAMUSCULAR; INTRAVENOUS ONCE
Status: COMPLETED | OUTPATIENT
Start: 2020-03-04 | End: 2020-03-04

## 2020-03-04 RX ADMIN — IOPAMIDOL 75 ML: 755 INJECTION, SOLUTION INTRAVENOUS at 18:15

## 2020-03-04 RX ADMIN — KETOROLAC TROMETHAMINE 30 MG: 30 INJECTION, SOLUTION INTRAMUSCULAR; INTRAVENOUS at 17:59

## 2020-03-04 ASSESSMENT — ENCOUNTER SYMPTOMS
SHORTNESS OF BREATH: 0
EYE PAIN: 0
NAUSEA: 0
SORE THROAT: 0
CHEST TIGHTNESS: 0
ABDOMINAL PAIN: 0
VOMITING: 0
COUGH: 0

## 2020-03-04 ASSESSMENT — PAIN DESCRIPTION - DESCRIPTORS
DESCRIPTORS: SHARP
DESCRIPTORS: SHOOTING;STABBING

## 2020-03-04 ASSESSMENT — PAIN DESCRIPTION - FREQUENCY: FREQUENCY: CONTINUOUS

## 2020-03-04 ASSESSMENT — PAIN DESCRIPTION - LOCATION
LOCATION: ABDOMEN
LOCATION: ABDOMEN

## 2020-03-04 ASSESSMENT — PAIN SCALES - GENERAL
PAINLEVEL_OUTOF10: 9
PAINLEVEL_OUTOF10: 7
PAINLEVEL_OUTOF10: 9

## 2020-03-04 ASSESSMENT — PAIN DESCRIPTION - PAIN TYPE
TYPE: ACUTE PAIN
TYPE: ACUTE PAIN

## 2020-03-04 ASSESSMENT — PAIN DESCRIPTION - ORIENTATION
ORIENTATION: RIGHT;UPPER
ORIENTATION: RIGHT

## 2020-03-04 NOTE — ED PROVIDER NOTES
Arthritis     Asthma     Cancer ([facility] [address] 4/2015    Right breast cancer metastatic to axillary node    Chronic back pain     Chronic cholecystitis with calculus 2/1/2018    Depression     History of blood transfusion     Hyperlipidemia     Hypertension     Multiple gallstones 10/3/2017    Prolonged emergence from general anesthesia     took 10-13 hours to wake up    SVT (supraventricular tachycardia) ([facility] [address]     Thyroid disease          SURGICALHISTORY       Past Surgical History:   Procedure Laterality Date    BREAST BIOPSY Right 4/16/15    ULTRASOUND GUIDED BIOPSY RIGHT AXILLA, RIGHT AXILLARY DISSECTION,ULTRASOUND GUIDED RIGHT BREAST BIOPSY    CHOLECYSTECTOMY      COLONOSCOPY  9/12/14    polypectomy jarmoszuk    [place] LAP,CHOLECYSTECTOMY/GRAPH N/A 4/13/2018    LAPAROSCOPIC CHOLECYSTECTOMY WITH GRAMS performed by Priscilla Bustillos MD at [address] W/SUBQ PORT/ CTR/PRPH INSJ N/A 2/21/2017    PORT REMOVAL performed by Rocio Phelan MD at [address] [facility]      tail bone    TONSILLECTOMY      TUBAL LIGATION      TUNNELED VENOUS PORT PLACEMENT Left 06/02/15    SUBCLAVIAN VEIN    WRIST GANGLION EXCISION Left          CURRENT MEDICATIONS       Previous Medications    ALBUTEROL SULFATE  (90 BASE) MCG/ACT INHALER    inhale 2 puffs by mouth every 6 hours if needed for wheezing    LEVOTHYROXINE (SYNTHROID) 50 MCG TABLET    Take 1 tablet by mouth Daily    LYMPHEDEMA PUMP    1 Device as needed (arm swelling)    MULTIPLE VITAMIN (MULTIVITAMINS PO)    Take by mouth daily    ONDANSETRON (ZOFRAN ODT) 4 MG DISINTEGRATING TABLET    Take 1 tablet by mouth every 8 hours as needed for Nausea    PANTOPRAZOLE (PROTONIX) 40 MG TABLET    take 1 tablet by mouth once daily    PRAVASTATIN (PRAVACHOL) 20 MG TABLET    take 1 tablet by mouth every evening    PREDNISONE (DELTASONE) 10 MG TABLET    4 po for 4 days 3 po for 4 days 2 po for 4 days 1 po for 4 days    SERTRALINE (ZOLOFT) 100 MG TABLET take 1 tablet by mouth once daily    VERAPAMIL (CALAN SR) 180 MG EXTENDED RELEASE TABLET    take 1 tablet by mouth once daily       ALLERGIES     Latex; Codeine; Oxycodone; Pcn [penicillins];  Tape Leola Jackson tape]; and Tramadol    FAMILY HISTORY       Family History   Problem Relation Age of Onset    Heart Disease Mother     Colon Cancer Father     Colon Polyps Father     Other Father 58        Post Op    Other Brother         Accidental Shooting    Brain Cancer Child           SOCIAL HISTORY       Social History     Socioeconomic History    Marital status:      Spouse name: None    Number of children: None    Years of education: None    Highest education level: None   Occupational History    None   Social Needs    Financial resource strain: None    Food insecurity:     Worry: None     Inability: None    Transportation needs:     Medical: None     Non-medical: None   Tobacco Use    Smoking status: Never Smoker    Smokeless tobacco: Never Used   Substance and Sexual Activity    Alcohol use: No    Drug use: No    Sexual activity: Not Currently   Lifestyle    Physical activity:     Days per week: None     Minutes per session: None    Stress: None   Relationships    Social connections:     Talks on phone: None     Gets together: None     Attends Jew service: None     Active member of club or organization: None     Attends meetings of clubs or organizations: None     Relationship status: None    Intimate partner violence:     Fear of current or ex partner: None     Emotionally abused: None     Physically abused: None     Forced sexual activity: None   Other Topics Concern    None   Social History Narrative    None       SCREENINGS              PHYSICAL EXAM    (up to 7 for level 4, 8 or more for level 5)     ED Triage Vitals [03/04/20 1722]   BP Temp Temp Source Pulse Resp SpO2 Height Weight   (!) 158/86 98.1 °F (36.7 °C) Oral 72 16 98 % 5' 4\" (1.626 m) 155 lb (70.3 kg) Physical Exam  Vitals signs and nursing note reviewed. Constitutional:       General: She is not in acute distress. Appearance: She is well-developed. She is not diaphoretic. HENT:      Head: Normocephalic and atraumatic. Right Ear: External ear normal.      Left Ear: External ear normal.      Mouth/Throat:      Pharynx: No oropharyngeal exudate. Eyes:      Conjunctiva/sclera: Conjunctivae normal.      Pupils: Pupils are equal, round, and reactive to light. Neck:      Musculoskeletal: Normal range of motion and neck supple. Thyroid: No thyromegaly. Vascular: No JVD. Trachea: No tracheal deviation. Cardiovascular:      Rate and Rhythm: Normal rate. Heart sounds: Normal heart sounds. No murmur. Pulmonary:      Effort: Pulmonary effort is normal. No respiratory distress. Breath sounds: Normal breath sounds. No wheezing. Comments: Right lateral ribs very tender to touch. Right breast tender also with well-healed lumpectomy scar. No masses palpable. No erythema. Chest:      Chest wall: Tenderness present. Abdominal:      General: Bowel sounds are normal.      Palpations: Abdomen is soft. Tenderness: There is no abdominal tenderness. There is no guarding. Musculoskeletal: Normal range of motion. Skin:     General: Skin is warm and dry. Findings: No rash. Neurological:      Mental Status: She is alert and oriented to person, place, and time. Cranial Nerves: No cranial nerve deficit.    Psychiatric:         Behavior: Behavior normal.         DIAGNOSTIC RESULTS     EKG: All EKG's are interpreted by the Emergency Department Physician who either signs or Co-signsthis chart in the absence of a cardiologist.        RADIOLOGY:   Non-plain filmimages such as CT, Ultrasound and MRI are read by the radiologist. Plain radiographic images are visualized and preliminarily interpreted by the emergency physician with the below findings:        Interpretation per the Radiologist below, if available at the time ofthis note:    CT CHEST W CONTRAST    (Results Pending)     CT chest no acute or significant change from 2/23/2018    ED BEDSIDE ULTRASOUND:   Performed by ED Physician - none    LABS:  Labs Reviewed   COMPREHENSIVE METABOLIC PANEL - Abnormal; Notable for the following components:       Result Value    Anion Gap 16 (*)     All other components within normal limits   POCT CREATININE - URINE - Normal   CBC WITH AUTO DIFFERENTIAL   PROTIME-INR       All other labs were within normal range or not returned as of this dictation. EMERGENCY DEPARTMENT COURSE and DIFFERENTIAL DIAGNOSIS/MDM:   Vitals:    Vitals:    03/04/20 1722   BP: (!) 158/86   Pulse: 72   Resp: 16   Temp: 98.1 °F (36.7 °C)   TempSrc: Oral   SpO2: 98%   Weight: 155 lb (70.3 kg)   Height: 5' 4\" (1.626 m)       Patient came in with pain in her right rib. Work-up was basically negative except for rib pain. CAT scan was performed because of her history of aggressive breast cancer  MDM      REASSESSMENT          CRITICAL CARE TIME   Total Critical Care time was 0 minutes, excluding separatelyreportable procedures. There was a high probability ofclinically significant/life threatening deterioration in the patient's condition which required my urgent intervention. CONSULTS:  None    PROCEDURES:  Unless otherwise noted below, none     Procedures    FINAL IMPRESSION      1. Rib pain on right side          DISPOSITION/PLAN   DISPOSITION        PATIENT REFERREDTO:  No follow-up provider specified. DISCHARGEMEDICATIONS:  Discharge Medication List as of 3/4/2020  7:08 PM      START taking these medications    Details   acetaminophen-codeine (TYLENOL #4) 300-60 MG per tablet Take 1 tablet by mouth every 4 hours as needed for Pain for up to 3 days. , Disp-20 tablet, R-0Print      lidocaine (LIDODERM) 5 % Place 1 patch onto the skin daily for 10 days 12 hours on, 12 hours off., Disp-10 patch, R-0Print           Controlled Substances Monitoring:     RX Monitoring 9/29/2017   Attestation The Prescription Monitoring Report for this patient was reviewed today.        (Please note that portions of this note were completed with a voice recognition program.  Efforts were made to edit the dictations but occasionally words are mis-transcribed.)    Carrie Obando DO (electronically signed)  Attending Emergency Physician          Carrie Obando DO  03/11/20 5522

## 2020-03-04 NOTE — PROGRESS NOTES
Subjective:     Patient ID: Bertin Arango is a 59 y.o. female who presentstoday for:  Chief Complaint   Patient presents with    Breast Pain     Pt. is here with c/o pain underneath her right breast X 2 weeks. Pt. states she c/o this before and had testing done. Pt. states its hard to breath. Pt. has a h/o aggressive cancer.  Health Maintenance     Pt. agrees to do the Cologuard. Chest Pain    This is a new (right sided rib pain) problem. Episode onset: x 2 weeks. The onset quality is gradual. The problem occurs daily. The problem has been gradually worsening. The pain is present in the lateral region (right sided). The pain is at a severity of 9/10. The quality of the pain is described as stabbing. Radiates to: right flank. Associated symptoms include shortness of breath. Pertinent negatives include no abdominal pain, back pain, claudication, cough, diaphoresis, dizziness, exertional chest pressure, fever, headaches, hemoptysis, irregular heartbeat, leg pain, lower extremity edema, malaise/fatigue, nausea, near-syncope, numbness, orthopnea, palpitations, PND, sputum production, syncope, vomiting or weakness. The pain is aggravated by deep breathing (palpation). She has tried nothing for the symptoms.        Past Medical History:   Diagnosis Date    Anxiety     Arthritis     Asthma     Cancer ([address] 4/2015    Right breast cancer metastatic to axillary node    Chronic back pain     Chronic cholecystitis with calculus 2/1/2018    Depression     History of blood transfusion     Hyperlipidemia     Hypertension     Multiple gallstones 10/3/2017    Prolonged emergence from general anesthesia     took 10-13 hours to wake up    SVT [facility]supraventricular tachycardia) ([facility] [address]     Thyroid disease      Current Outpatient Medications on File Prior to Visit   Medication Sig Dispense Refill    verapamil (CALAN SR) 180 MG extended release tablet take 1 tablet by mouth once daily 90 tablet 3    albuterol sulfate  (90 Base) MCG/ACT inhaler inhale 2 puffs by mouth every 6 hours if needed for wheezing 18 g 3    levothyroxine (SYNTHROID) 50 MCG tablet Take 1 tablet by mouth Daily 90 tablet 3    pravastatin (PRAVACHOL) 20 MG tablet take 1 tablet by mouth every evening 30 tablet 5    sertraline (ZOLOFT) 100 MG tablet take 1 tablet by mouth once daily 90 tablet 3    predniSONE (DELTASONE) 10 MG tablet 4 po for 4 days 3 po for 4 days 2 po for 4 days 1 po for 4 days 40 tablet 0    LYMPHEDEMA PUMP 1 Device as needed (arm swelling) 1 Device 0    Multiple Vitamin (MULTIVITAMINS PO) Take by mouth daily      pantoprazole (PROTONIX) 40 MG tablet take 1 tablet by mouth once daily 30 tablet 3    ondansetron (ZOFRAN ODT) 4 MG disintegrating tablet Take 1 tablet by mouth every 8 hours as needed for Nausea 20 tablet 0     No current facility-administered medications on file prior to visit.       Past Surgical History:   Procedure Laterality Date    BREAST BIOPSY Right 4/16/15    ULTRASOUND GUIDED BIOPSY RIGHT AXILLA, RIGHT AXILLARY DISSECTION,ULTRASOUND GUIDED RIGHT BREAST BIOPSY    CHOLECYSTECTOMY      COLONOSCOPY  9/12/14    polypectomy ger    [place] LAP,CHOLECYSTECTOMY/GRAPH N/A 4/13/2018    LAPAROSCOPIC CHOLECYSTECTOMY WITH GRAMS performed by Daniel Dailey MD at [address] W/SUBQ PORT/ CTR/PRPH INSJ N/A 2/21/2017    PORT REMOVAL performed by Jenny Acuña MD at [address] [place]      tail bone    TONSILLECTOMY      TUBAL LIGATION      TUNNELED VENOUS PORT PLACEMENT Left 06/02/15    SUBCLAVIAN VEIN    WRIST GANGLION EXCISION Left         Family History   Problem Relation Age of Onset    Heart Disease Mother     Colon Cancer Father     Colon Polyps Father     Other Father 58        Post Op    Other Brother         Accidental Shooting    Brain Cancer Child      Social History     Socioeconomic History    Marital status:      Spouse name: Not on file    Number of pain and vaginal pain. Musculoskeletal: Negative for back pain and gait problem. Skin: Negative for color change and wound. Neurological: Negative for dizziness, tingling, weakness, numbness, headaches and paresthesias. Psychiatric/Behavioral: Positive for sleep disturbance (due to pain). Objective:    /70 (Site: Left Upper Arm, Position: Sitting, Cuff Size: Medium Adult)   Pulse 73   Temp 98.1 °F (36.7 °C) (Oral)   Resp 16   Ht 5' 4\" (1.626 m)   Wt 155 lb (70.3 kg)   LMP  (LMP Unknown) Comment: last menses at age 48  SpO2 98%   Breastfeeding No   BMI 26.61 kg/m²     Physical Exam  Constitutional:       Appearance: She is not toxic-appearing. Comments: Appears to be in pain   HENT:      Head: Normocephalic and atraumatic. Right Ear: External ear normal.      Left Ear: External ear normal.      Nose: Nose normal.      Mouth/Throat:      Mouth: Mucous membranes are moist.   Eyes:      Extraocular Movements: Extraocular movements intact. Conjunctiva/sclera: Conjunctivae normal.      Pupils: Pupils are equal, round, and reactive to light. Neck:      Musculoskeletal: Neck supple. No muscular tenderness. Cardiovascular:      Rate and Rhythm: Normal rate and regular rhythm. Heart sounds: Normal heart sounds. Pulmonary:      Breath sounds: Normal breath sounds and air entry. No decreased breath sounds. Chest:      Chest wall: Swelling and tenderness present. No crepitus. Comments: Right sided  Abdominal:      General: Abdomen is flat. Bowel sounds are normal. There is no distension. Palpations: Abdomen is soft. Tenderness: There is no abdominal tenderness. Musculoskeletal:         General: No swelling or tenderness. Skin:     General: Skin is warm and dry. Findings: No erythema. Neurological:      Mental Status: She is alert and oriented to person, place, and time. Motor: No weakness.       Gait: Gait normal.         Assessment & Plan:

## 2020-03-05 ASSESSMENT — ENCOUNTER SYMPTOMS
SHORTNESS OF BREATH: 1
BOWEL INCONTINENCE: 0
ORTHOPNEA: 0
BACK PAIN: 0
COLOR CHANGE: 0
SPUTUM PRODUCTION: 0
DIARRHEA: 0
VOMITING: 0
WHEEZING: 0
HEMOPTYSIS: 0
NAUSEA: 0
ABDOMINAL PAIN: 0
EYES NEGATIVE: 1
COUGH: 0

## 2020-04-06 RX ORDER — PRAVASTATIN SODIUM 20 MG
TABLET ORAL
Qty: 30 TABLET | Refills: 5 | Status: SHIPPED | OUTPATIENT
Start: 2020-04-06 | End: 2020-09-18 | Stop reason: SDUPTHER

## 2020-06-11 ENCOUNTER — TELEPHONE (OUTPATIENT)
Dept: FAMILY MEDICINE CLINIC | Age: 65
End: 2020-06-11

## 2020-06-11 NOTE — TELEPHONE ENCOUNTER
Patient called into the office stating that she received an automated call about getting a colonoscopy done or getting a kit in the mail. Please advise.

## 2020-07-07 ENCOUNTER — HOSPITAL ENCOUNTER (OUTPATIENT)
Dept: PHYSICAL THERAPY | Age: 65
Setting detail: THERAPIES SERIES
Discharge: HOME | End: 2020-07-07
Payer: MEDICAID

## 2020-08-05 ENCOUNTER — TELEPHONE (OUTPATIENT)
Dept: ADMINISTRATIVE | Age: 65
End: 2020-08-05

## 2020-08-10 RX ORDER — ALBUTEROL SULFATE 90 UG/1
AEROSOL, METERED RESPIRATORY (INHALATION)
Qty: 18 G | Refills: 3 | Status: SHIPPED | OUTPATIENT
Start: 2020-08-10 | End: 2020-09-18 | Stop reason: SDUPTHER

## 2020-08-10 RX ORDER — SERTRALINE HYDROCHLORIDE 100 MG/1
TABLET, FILM COATED ORAL
Qty: 90 TABLET | Refills: 3 | Status: SHIPPED | OUTPATIENT
Start: 2020-08-10 | End: 2020-09-18 | Stop reason: SDUPTHER

## 2020-09-08 ENCOUNTER — VIRTUAL VISIT (OUTPATIENT)
Dept: FAMILY MEDICINE CLINIC | Age: 65
End: 2020-09-08
Payer: MEDICARE

## 2020-09-08 PROCEDURE — 4040F PNEUMOC VAC/ADMIN/RCVD: CPT | Performed by: NURSE PRACTITIONER

## 2020-09-08 PROCEDURE — 3017F COLORECTAL CA SCREEN DOC REV: CPT | Performed by: NURSE PRACTITIONER

## 2020-09-08 PROCEDURE — G0438 PPPS, INITIAL VISIT: HCPCS | Performed by: NURSE PRACTITIONER

## 2020-09-08 PROCEDURE — 1123F ACP DISCUSS/DSCN MKR DOCD: CPT | Performed by: NURSE PRACTITIONER

## 2020-09-08 ASSESSMENT — PATIENT HEALTH QUESTIONNAIRE - PHQ9
SUM OF ALL RESPONSES TO PHQ QUESTIONS 1-9: 2
SUM OF ALL RESPONSES TO PHQ QUESTIONS 1-9: 2

## 2020-09-08 ASSESSMENT — LIFESTYLE VARIABLES: HOW OFTEN DO YOU HAVE A DRINK CONTAINING ALCOHOL: 0

## 2020-09-08 NOTE — PATIENT INSTRUCTIONS
Personalized Preventive Plan for Ana Laura Thayer - 9/8/2020  Medicare offers a range of preventive health benefits. Some of the tests and screenings are paid in full while other may be subject to a deductible, co-insurance, and/or copay. Some of these benefits include a comprehensive review of your medical history including lifestyle, illnesses that may run in your family, and various assessments and screenings as appropriate. After reviewing your medical record and screening and assessments performed today your provider may have ordered immunizations, labs, imaging, and/or referrals for you. A list of these orders (if applicable) as well as your Preventive Care list are included within your After Visit Summary for your review. Other Preventive Recommendations:    · A preventive eye exam performed by an eye specialist is recommended every 1-2 years to screen for glaucoma; cataracts, macular degeneration, and other eye disorders. · A preventive dental visit is recommended every 6 months. · Try to get at least 150 minutes of exercise per week or 10,000 steps per day on a pedometer . · Order or download the FREE \"Exercise & Physical Activity: Your Everyday Guide\" from The [facility]. Call 4-279.688.3276 or search The [facility] online. · You need 8526-4022 mg of calcium and 2573-1920 IU of vitamin D per day. It is possible to meet your calcium requirement with diet alone, but a vitamin D supplement is usually necessary to meet this goal.  · When exposed to the sun, use a sunscreen that protects against both UVA and UVB radiation with an SPF of 30 or greater. Reapply every 2 to 3 hours or after sweating, drying off with a towel, or swimming. · Always wear a seat belt when traveling in a car. Always wear a helmet when riding a bicycle or motorcycle.

## 2020-09-08 NOTE — PROGRESS NOTES
Medicare Annual Wellness Visit  Are Name: Rebeca Milian Date: 2020   MRN: [id number] Sex: Female   Age: 72 y.o. Ethnicity: Non-/Non    : 1955 Race: Irina Zamorano is here for Medicare AWV    Screenings for behavioral, psychosocial and functional/safety risks, and cognitive dysfunction are all negative except as indicated below. These results, as well as other patient data from the [address] form, are documented in Flowsheets linked to this Encounter. Allergies   Allergen Reactions    Latex Rash     Tape, bandaids    Oxycodone Nausea And Vomiting     Dose-related, 1/2 tab was okay.  Pcn [Penicillins] Rash     As a child    Tape Narinder Flakes Tape] Rash    Tramadol Itching         Prior to Visit Medications    Medication Sig Taking?  Authorizing Provider   sertraline (ZOLOFT) 100 MG tablet take 1 tablet by mouth once daily  Fabrizio Smith MD   albuterol sulfate  (90 Base) MCG/ACT inhaler inhale 2 puffs by mouth every 6 hours if needed for wheezing  Fabrizio Smith MD   pravastatin (PRAVACHOL) 20 MG tablet take 1 tablet by mouth every evening  Fabrizio Smith MD   verapamil (CALAN SR) 180 MG extended release tablet take 1 tablet by mouth once daily  Fabrizio Smith MD   levothyroxine (SYNTHROID) 50 MCG tablet Take 1 tablet by mouth Daily  Fabrizio Smith MD   predniSONE (DELTASONE) 10 MG tablet 4 po for 4 days 3 po for 4 days 2 po for 4 days 1 po for 4 days  Fabrizio Smith MD   LYMPHEDEMA PUMP 1 Device as needed (arm swelling)  Jose Angel Burn, APRN - CNP   Multiple Vitamin (MULTIVITAMINS PO) Take by mouth daily  Historical Provider, MD   pantoprazole (PROTONIX) 40 MG tablet take 1 tablet by mouth once daily  Fabrizio Smith MD   ondansetron (ZOFRAN ODT) 4 MG disintegrating tablet Take 1 tablet by mouth every 8 hours as needed for Nausea  MARQUES Ornelas         Past Medical History:   Diagnosis Date    Anxiety     Arthritis     Asthma     Cancer [facility]) 4/2015    Right breast cancer metastatic to axillary node    Chronic back pain     Chronic cholecystitis with calculus 2/1/2018    Depression     History of blood transfusion     Hyperlipidemia     Hypertension     Multiple gallstones 10/3/2017    Prolonged emergence from general anesthesia     took 10-13 hours to wake up    SVT (supraventricular tachycardia) ([address]     Thyroid disease        Past Surgical History:   Procedure Laterality Date    BREAST BIOPSY Right 4/16/15    ULTRASOUND GUIDED BIOPSY RIGHT AXILLA, RIGHT AXILLARY DISSECTION,ULTRASOUND GUIDED RIGHT BREAST BIOPSY    CHOLECYSTECTOMY      COLONOSCOPY  9/12/14    polypectomy jarm[place]    NM LAP,CHOLECYSTECTOMY/GRAPH N/A 4/13/2018    LAPAROSCOPIC CHOLECYSTECTOMY WITH GRAMS performed by Juventino Hdez MD at [address] [facility] [address] [facility] W/SUBQ PORT/ CTR/PRPH INSJ N/A 2/21/2017    PORT REMOVAL performed by Julissa Fermin MD at [address] [place]      tail bone    TONSILLECTOMY      TUBAL LIGATION      TUNNELED VENOUS PORT PLACEMENT Left 06/02/15    SUBCLAVIAN VEIN    WRIST GANGLION EXCISION Left          Family History   Problem Relation Age of Onset    Heart Disease Mother     Colon Cancer Father     Colon Polyps Father     Other Father 58        Post Op    Other Brother         Accidental Shooting    Brain Cancer Child        CareTeam (Including outside providers/suppliers regularly involved in providing care):   Patient Care Team:  John Billingsley MD as PCP - Stalin Fields MD as PCP - [facility]  Julissa Fermin MD (General Surgery)  Claudette Campanile, MD (Radiation Oncology)  Dean Nobles DO as Consulting Physician (Hematology and Oncology)    Wt Readings from Last 3 Encounters:   03/04/20 155 lb (70.3 kg)   03/04/20 155 lb (70.3 kg)   07/10/19 148 lb (67.1 kg)      No flowsheet data found. There is no height or weight on file to calculate BMI.     Based upon direct observation of the patient, evaluation of cognition reveals recent and remote memory intact. This was a telephone encounter and I did not physically see this patient in person. Patient's complete Health Risk Assessment and screening values have been reviewed and are found in Flowsheets. The following problems were reviewed today and where indicated follow up appointments were made and/or referrals ordered. Positive Risk Factor Screenings with Interventions:     General Health:  General  In general, how would you say your health is?: Good  In the past 7 days, have you experienced any of the following? New or Increased Pain, New or Increased Fatigue, Loneliness, Social Isolation, Stress or Anger?: (!) Loneliness  Do you get the social and emotional support that you need?: Yes  Do you have a Living Will?: Yes  General Health Risk Interventions:  · Patient notes that she is on medication for depression however continues to feel emotions going up and down and thinks that she needs another medication added to her depression medicine.  She denies any SI or HI    Personalized Preventive Plan   Current Health Maintenance Status  Immunization History   Administered Date(s) Administered    Influenza Vaccine, unspecified formulation 09/30/2017    Influenza Virus Vaccine 09/09/2014    Influenza Whole 09/09/2014    Influenza, Triv, 3 Years and older, IM, PF (Afluria 5yrs and older) 09/30/2017    Pneumococcal Conjugate 13-valent (Zahira Heaps) 12/19/2018    Zoster Recombinant (Shingrix) 08/09/2020        Health Maintenance   Topic Date Due    Hepatitis C screen  1955    HIV screen  08/16/1970    DTaP/Tdap/Td vaccine (1 - Tdap) 08/16/1974    DEXA (modify frequency per FRAX score)  08/16/2010    Lipid screen  03/19/2016    Colon cancer screen colonoscopy  09/12/2019    TSH testing  01/16/2020    Pneumococcal 65+ yrs at Risk Vaccine (2 of 2 - PPSV23) 08/16/2020    Annual Wellness Visit (AWV)  08/30/2020    Flu vaccine (1) 09/01/2020  Shingles Vaccine (2 of 2) 10/04/2020    Breast cancer screen  10/21/2020    Cervical cancer screen  07/10/2022    Hepatitis A vaccine  Aged Out    Hepatitis B vaccine  Aged Out    Hib vaccine  Aged Out    Meningococcal (ACWY) vaccine  Aged Out     Recommendations for tinyclues Due: see orders and patient instructions/AVS.  . Recommended screening schedule for the next 5-10 years is provided to the patient in written form: see Patient Instructions/AVS.    Krystle MADISON was seen today for medicare awv. Diagnoses and all orders for this visit:    Routine general medical examination at a health care facility              Cleta Severs is a 72 y.o. female being evaluated by a Virtual Visit (phone) encounter to address concerns as mentioned above. A caregiver was present when appropriate. Due to this being a TeleHealth encounter (During [facility] health emergency), evaluation of the following organ systems was limited: Vitals/Constitutional/EENT/Resp/CV/GI//MS/Neuro/Skin/Heme-Lymph-Imm. Pursuant to the emergency declaration under the [address] [place] [address], [address] [place] [address] authority and the .[employer] and [employer] Act, this Virtual Visit was conducted with patient's (and/or legal guardian's) consent, to reduce the patient's risk of exposure to COVID-19 and provide necessary medical care. The patient (and/or legal guardian) has also been advised to contact this office for worsening conditions or problems, and seek emergency medical treatment and/or call 911 if deemed necessary. Patient identification was verified at the start of the visit: Yes    Services were provided through phone to substitute for in-person clinic visit. Patient and provider were located at their individual homes. --YOEL Mccord CNP on 9/8/2020 at 1:32 PM    An electronic signature was used to authenticate this note.

## 2020-09-18 RX ORDER — ALBUTEROL SULFATE 90 UG/1
2 AEROSOL, METERED RESPIRATORY (INHALATION) EVERY 6 HOURS PRN
Qty: 18 G | Refills: 3 | Status: SHIPPED | OUTPATIENT
Start: 2020-09-18

## 2020-09-18 RX ORDER — PRAVASTATIN SODIUM 20 MG
20 TABLET ORAL DAILY
Qty: 90 TABLET | Refills: 3 | Status: SHIPPED | OUTPATIENT
Start: 2020-09-18 | End: 2022-09-27

## 2020-09-18 RX ORDER — SERTRALINE HYDROCHLORIDE 100 MG/1
100 TABLET, FILM COATED ORAL DAILY
Qty: 90 TABLET | Refills: 3 | Status: SHIPPED | OUTPATIENT
Start: 2020-09-18

## 2020-09-18 RX ORDER — LEVOTHYROXINE SODIUM 0.05 MG/1
50 TABLET ORAL DAILY
Qty: 90 TABLET | Refills: 3 | Status: SHIPPED | OUTPATIENT
Start: 2020-09-18 | End: 2020-10-28 | Stop reason: ALTCHOICE

## 2020-10-28 ENCOUNTER — OFFICE VISIT (OUTPATIENT)
Dept: FAMILY MEDICINE CLINIC | Age: 65
End: 2020-10-28
Payer: MEDICARE

## 2020-10-28 VITALS
BODY MASS INDEX: 25.61 KG/M2 | WEIGHT: 150 LBS | HEIGHT: 64 IN | DIASTOLIC BLOOD PRESSURE: 70 MMHG | HEART RATE: 74 BPM | TEMPERATURE: 98.7 F | OXYGEN SATURATION: 97 % | SYSTOLIC BLOOD PRESSURE: 142 MMHG

## 2020-10-28 DIAGNOSIS — E03.9 HYPOTHYROIDISM, UNSPECIFIED TYPE: ICD-10-CM

## 2020-10-28 LAB
ALBUMIN SERPL-MCNC: 4.5 G/DL (ref 3.5–4.6)
ALP BLD-CCNC: 96 U/L (ref 40–130)
ALT SERPL-CCNC: 14 U/L (ref 0–33)
ANION GAP SERPL CALCULATED.3IONS-SCNC: 16 MEQ/L (ref 9–15)
AST SERPL-CCNC: 22 U/L (ref 0–35)
BASOPHILS ABSOLUTE: 0 K/UL (ref 0–0.2)
BASOPHILS RELATIVE PERCENT: 0.6 %
BILIRUB SERPL-MCNC: 0.3 MG/DL (ref 0.2–0.7)
BUN BLDV-MCNC: 23 MG/DL (ref 8–23)
CALCIUM SERPL-MCNC: 9.6 MG/DL (ref 8.5–9.9)
CHLORIDE BLD-SCNC: 100 MEQ/L (ref 95–107)
CHOLESTEROL, TOTAL: 210 MG/DL (ref 0–199)
CO2: 24 MEQ/L (ref 20–31)
CREAT SERPL-MCNC: 0.63 MG/DL (ref 0.5–0.9)
EOSINOPHILS ABSOLUTE: 0.1 K/UL (ref 0–0.7)
EOSINOPHILS RELATIVE PERCENT: 2.6 %
GFR AFRICAN AMERICAN: >60
GFR NON-AFRICAN AMERICAN: >60
GLOBULIN: 2.9 G/DL (ref 2.3–3.5)
GLUCOSE BLD-MCNC: 102 MG/DL (ref 70–99)
HCT VFR BLD CALC: 41.2 % (ref 37–47)
HDLC SERPL-MCNC: 84 MG/DL (ref 40–59)
HEMOGLOBIN: 13.6 G/DL (ref 12–16)
LDL CHOLESTEROL CALCULATED: 107 MG/DL (ref 0–129)
LYMPHOCYTES ABSOLUTE: 1.2 K/UL (ref 1–4.8)
LYMPHOCYTES RELATIVE PERCENT: 26 %
MCH RBC QN AUTO: 30.3 PG (ref 27–31.3)
MCHC RBC AUTO-ENTMCNC: 33 % (ref 33–37)
MCV RBC AUTO: 92 FL (ref 82–100)
MONOCYTES ABSOLUTE: 0.7 K/UL (ref 0.2–0.8)
MONOCYTES RELATIVE PERCENT: 15 %
NEUTROPHILS ABSOLUTE: 2.6 K/UL (ref 1.4–6.5)
NEUTROPHILS RELATIVE PERCENT: 55.8 %
PDW BLD-RTO: 12.8 % (ref 11.5–14.5)
PLATELET # BLD: 229 K/UL (ref 130–400)
POTASSIUM SERPL-SCNC: 3.7 MEQ/L (ref 3.4–4.9)
RBC # BLD: 4.49 M/UL (ref 4.2–5.4)
SODIUM BLD-SCNC: 140 MEQ/L (ref 135–144)
TOTAL PROTEIN: 7.4 G/DL (ref 6.3–8)
TRIGL SERPL-MCNC: 97 MG/DL (ref 0–150)
TSH SERPL DL<=0.05 MIU/L-ACNC: 5.44 UIU/ML (ref 0.44–3.86)
WBC # BLD: 4.6 K/UL (ref 4.8–10.8)

## 2020-10-28 PROCEDURE — 99214 OFFICE O/P EST MOD 30 MIN: CPT | Performed by: FAMILY MEDICINE

## 2020-10-28 PROCEDURE — 1123F ACP DISCUSS/DSCN MKR DOCD: CPT | Performed by: FAMILY MEDICINE

## 2020-10-28 PROCEDURE — 1090F PRES/ABSN URINE INCON ASSESS: CPT | Performed by: FAMILY MEDICINE

## 2020-10-28 PROCEDURE — G8484 FLU IMMUNIZE NO ADMIN: HCPCS | Performed by: FAMILY MEDICINE

## 2020-10-28 PROCEDURE — 1036F TOBACCO NON-USER: CPT | Performed by: FAMILY MEDICINE

## 2020-10-28 PROCEDURE — 3017F COLORECTAL CA SCREEN DOC REV: CPT | Performed by: FAMILY MEDICINE

## 2020-10-28 PROCEDURE — G8427 DOCREV CUR MEDS BY ELIG CLIN: HCPCS | Performed by: FAMILY MEDICINE

## 2020-10-28 PROCEDURE — 4040F PNEUMOC VAC/ADMIN/RCVD: CPT | Performed by: FAMILY MEDICINE

## 2020-10-28 PROCEDURE — G8417 CALC BMI ABV UP PARAM F/U: HCPCS | Performed by: FAMILY MEDICINE

## 2020-10-28 PROCEDURE — G8400 PT W/DXA NO RESULTS DOC: HCPCS | Performed by: FAMILY MEDICINE

## 2020-10-28 RX ORDER — LEVOTHYROXINE SODIUM 0.07 MG/1
75 TABLET ORAL DAILY
Qty: 90 TABLET | Refills: 3 | Status: SHIPPED | OUTPATIENT
Start: 2020-10-28 | End: 2023-02-27 | Stop reason: SDUPTHER

## 2020-10-28 SDOH — ECONOMIC STABILITY: FOOD INSECURITY: WITHIN THE PAST 12 MONTHS, YOU WORRIED THAT YOUR FOOD WOULD RUN OUT BEFORE YOU GOT MONEY TO BUY MORE.: NEVER TRUE

## 2020-10-28 SDOH — ECONOMIC STABILITY: INCOME INSECURITY: HOW HARD IS IT FOR YOU TO PAY FOR THE VERY BASICS LIKE FOOD, HOUSING, MEDICAL CARE, AND HEATING?: NOT HARD AT ALL

## 2020-10-28 SDOH — ECONOMIC STABILITY: TRANSPORTATION INSECURITY
IN THE PAST 12 MONTHS, HAS LACK OF TRANSPORTATION KEPT YOU FROM MEETINGS, WORK, OR FROM GETTING THINGS NEEDED FOR DAILY LIVING?: NO

## 2020-10-28 SDOH — ECONOMIC STABILITY: FOOD INSECURITY: WITHIN THE PAST 12 MONTHS, THE FOOD YOU BOUGHT JUST DIDN'T LAST AND YOU DIDN'T HAVE MONEY TO GET MORE.: NEVER TRUE

## 2020-10-28 SDOH — ECONOMIC STABILITY: TRANSPORTATION INSECURITY
IN THE PAST 12 MONTHS, HAS THE LACK OF TRANSPORTATION KEPT YOU FROM MEDICAL APPOINTMENTS OR FROM GETTING MEDICATIONS?: NO

## 2020-10-28 ASSESSMENT — ENCOUNTER SYMPTOMS
CHEST TIGHTNESS: 0
GASTROINTESTINAL NEGATIVE: 1
RHINORRHEA: 0
EYES NEGATIVE: 1
COUGH: 0
RESPIRATORY NEGATIVE: 1

## 2020-10-28 NOTE — PROGRESS NOTES
Patient is seen in follow up for   Chief Complaint   Patient presents with   [facility] Maintenance     OK for Colonoscopy referral - Mammogram ordered for Nov 2nd    Skin Problem     Skin tag on upper outer right arm. Growing a lot recently and is getting snagged on her clothing.  Joint Pain     Having a lot of troubles with her joints. Not sure if it's arthritis. A lot of pains in her hips and thighs. Taking Ibuprofen for about 2 weeks which has helped a little bit.      HPIHere with skin lesion getting worse for several months    Past Medical History:   Diagnosis Date    Anxiety     Arthritis     Asthma     Cancer ([address] 4/2015    Right breast cancer metastatic to axillary node    Chronic back pain     Chronic cholecystitis with calculus 2/1/2018    Depression     History of blood transfusion     Hyperlipidemia     Hypertension     Multiple gallstones 10/3/2017    Prolonged emergence from general anesthesia     took 10-13 hours to wake up    SVT (supraventricular tachycardia) ([address]     Thyroid disease      Patient Active Problem List    Diagnosis Date Noted    Secondary and unspecified malignant neoplasm of axilla and upper limb lymph nodes ([address] 09/28/2018    Estrogen receptor negative status (ER-) 09/28/2018    Lobular carcinoma in situ of right breast 09/28/2018    Personal history of malignant neoplasm of breast 09/28/2018    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    Chronic cholecystitis with calculus 02/01/2018    Rib pain on right side 02/01/2018    Multiple gallstones 10/03/2017    Epigastric abdominal pain 09/30/2017    Lumbar spine painful on movement 09/30/2017    Degeneration, intervertebral disc, lumbar 09/30/2017    Malignant neoplasm of axillary 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 ([address] 09/14/2016    Hypothyroid 08/05/2014    Colon polyps 08/05/2014    SVT (supraventricular tachycardia) (HCC)     Hypertension     Hyperlipidemia     Asthma     Anxiety     Depression  Chronic back pain      Past Surgical History:   Procedure Laterality Date    BREAST BIOPSY Right 4/16/15    ULTRASOUND GUIDED BIOPSY RIGHT AXILLA, RIGHT AXILLARY DISSECTION,ULTRASOUND GUIDED RIGHT BREAST BIOPSY    CHOLECYSTECTOMY      COLONOSCOPY  9/12/14    polypectomy ger [place] LAP,CHOLECYSTECTOMY/GRAPH N/A 4/13/2018    LAPAROSCOPIC CHOLECYSTECTOMY WITH GRAMS performed by Salvatore Mackay MD at [address] W/SUBQ PORT/ CTR/PRPH INSJ N/A 2/21/2017    PORT REMOVAL performed by Bari Murphy MD at [address] [place]      tail bone    TONSILLECTOMY      TUBAL LIGATION      TUNNELED VENOUS PORT PLACEMENT Left 06/02/15    SUBCLAVIAN VEIN    WRIST GANGLION EXCISION Left      Family History   Problem Relation Age of Onset    Heart Disease Mother     Colon Cancer Father     Colon Polyps Father     Other Father 58        Post Op    Other Brother         Accidental Shooting    Brain Cancer Child      Social History     Socioeconomic History    Marital status:      Spouse name: None    Number of children: None    Years of education: None    Highest education level: None   Occupational History    None   Social Needs    Financial resource strain: Not hard at all   [place] insecurity     Worry: Never true     Inability: Never true    Transportation needs     Medical: No     Non-medical: No   Tobacco Use    Smoking status: Never Smoker    Smokeless tobacco: Never Used   Substance and Sexual Activity    Alcohol use: No    Drug use: No    Sexual activity: Not Currently   Lifestyle    Physical activity     Days per week: None     Minutes per session: None    Stress: None   Relationships    Social connections     Talks on phone: None     Gets together: None     Attends Yazidi service: None     Active member of club or organization: None     Attends meetings of clubs or organizations: None     Relationship status: None    Intimate partner violence Fear of current or ex partner: None     Emotionally abused: None     Physically abused: None     Forced sexual activity: None   Other Topics Concern    None   Social History Narrative    None     Current Outpatient Medications   Medication Sig Dispense Refill    albuterol sulfate  (90 Base) MCG/ACT inhaler Inhale 2 puffs into the lungs every 6 hours as needed for Wheezing 18 g 3    levothyroxine (SYNTHROID) 50 MCG tablet Take 1 tablet by mouth Daily 90 tablet 3    pravastatin (PRAVACHOL) 20 MG tablet Take 1 tablet by mouth daily 90 tablet 3    sertraline (ZOLOFT) 100 MG tablet Take 1 tablet by mouth daily 90 tablet 3    verapamil (CALAN SR) 180 MG extended release tablet Take 1 tablet by mouth nightly 90 tablet 3    LYMPHEDEMA PUMP 1 Device as needed (arm swelling) 1 Device 0     No current facility-administered medications for this visit. Current Outpatient Medications on File Prior to Visit   Medication Sig Dispense Refill    albuterol sulfate  (90 Base) MCG/ACT inhaler Inhale 2 puffs into the lungs every 6 hours as needed for Wheezing 18 g 3    levothyroxine (SYNTHROID) 50 MCG tablet Take 1 tablet by mouth Daily 90 tablet 3    pravastatin (PRAVACHOL) 20 MG tablet Take 1 tablet by mouth daily 90 tablet 3    sertraline (ZOLOFT) 100 MG tablet Take 1 tablet by mouth daily 90 tablet 3    verapamil (CALAN SR) 180 MG extended release tablet Take 1 tablet by mouth nightly 90 tablet 3    LYMPHEDEMA PUMP 1 Device as needed (arm swelling) 1 Device 0     No current facility-administered medications on file prior to visit. Allergies   Allergen Reactions    Latex Rash     Tape, bandaids    Oxycodone Nausea And Vomiting     Dose-related, 1/2 tab was okay.     Pcn [Penicillins] Rash     As a child    Tape [Adhesive Tape] Rash    Tramadol Itching     Health Maintenance   Topic Date Due    DTaP/Tdap/Td vaccine (1 - Tdap) 08/16/1974    DEXA (modify frequency per FRAX score)  08/16/2010  Lipid screen  03/19/2016    Colon cancer screen colonoscopy  09/12/2019    TSH testing  01/16/2020    Pneumococcal 65+ yrs at Risk Vaccine (2 of 2 - PPSV23) 08/16/2020    Flu vaccine (1) 09/01/2020    Shingles Vaccine (2 of 2) 10/04/2020    Breast cancer screen  10/21/2020    Hepatitis C screen  10/26/2021 (Originally 1955)    HIV screen  10/26/2021 (Originally 8/16/1970)    Annual Wellness Visit (AWV)  09/10/2021    Cervical cancer screen  07/10/2022    Hepatitis A vaccine  Aged Out    Hepatitis B vaccine  Aged Out    Hib vaccine  Aged Out    Meningococcal (ACWY) vaccine  Aged Out       Review of Systems     Review of Systems   Constitutional: Negative for activity change, appetite change, fatigue and fever. HENT: Negative for congestion and rhinorrhea. Eyes: Negative. Respiratory: Negative. Negative for cough and chest tightness. Cardiovascular: Negative. Gastrointestinal: Negative. Endocrine: Negative. Genitourinary: Negative. Musculoskeletal: Negative. Skin: Negative. Neurological: Negative for dizziness, light-headedness and numbness. Hematological: Negative. Psychiatric/Behavioral: Negative. Physical Exam  Vitals:    10/28/20 0850 10/28/20 0902   BP: (!) 144/74 (!) 142/70   Site: Left Upper Arm Left Upper Arm   Position: Sitting Sitting   Cuff Size: Large Adult Large Adult   Pulse: 74    Temp: 98.7 °F (37.1 °C)    TempSrc: Oral    SpO2: 97%    Weight: 150 lb (68 kg)    Height: 5' 4\" (1.626 m)        Physical Exam  Constitutional:       Appearance: She is well-developed. HENT:      Right Ear: External ear normal.      Left Ear: External ear normal.   Eyes:      Pupils: Pupils are equal, round, and reactive to light. Neck:      Musculoskeletal: Normal range of motion and neck supple. Thyroid: No thyromegaly. Cardiovascular:      Rate and Rhythm: Normal rate and regular rhythm. Heart sounds: Normal heart sounds. No murmur.  No friction rub. No gallop. Pulmonary:      Effort: Pulmonary effort is normal. No respiratory distress. Breath sounds: No wheezing or rales. Chest:      Chest wall: No tenderness. Abdominal:      General: Bowel sounds are normal. There is no distension. Palpations: Abdomen is soft. There is no mass. Tenderness: There is no abdominal tenderness. There is no guarding or rebound. Musculoskeletal: Normal range of motion. Lymphadenopathy:      Cervical: No cervical adenopathy. Skin:     General: Skin is warm and dry. Neurological:      Mental Status: She is alert and oriented to person, place, and time. Cranial Nerves: No cranial nerve deficit. Coordination: Coordination normal.         Assessment   Diagnosis Orders   1. Essential hypertension     2. Post-menopausal     3. Screening for colon cancer  Kee Wade MD, Gastroenterology, [place]   4. Encounter for screening mammogram for breast cancer     5. Hypothyroidism, unspecified type  TSH Without Reflex    CBC Auto Differential    Comprehensive Metabolic Panel    Lipid Panel   6. Secondary and unspecified malignant neoplasm of axilla and upper limb lymph nodes ([address]     7. SVT (supraventricular tachycardia) ([address]     8. Severe persistent asthma without complication     9.  Keratoacanthoma       Problem List     Hypertension - Primary    Asthma    Relevant Medications    albuterol sulfate  (90 Base) MCG/ACT inhaler    SVT (supraventricular tachycardia) (HCC)    Hypothyroid    Relevant Medications    levothyroxine (SYNTHROID) 50 MCG tablet    Other Relevant Orders    TSH Without Reflex    CBC Auto Differential    Comprehensive Metabolic Panel    Lipid Panel    Secondary and unspecified malignant neoplasm of axilla and upper limb lymph nodes (HCC)          Plan  Orders Placed This Encounter   Procedures    TSH Without Reflex    CBC Auto Differential     Standing Status:   Future     Standing Expiration Date: 10/28/2021    Comprehensive Metabolic Panel     Standing Status:   Future     Standing Expiration Date:   10/28/2021    Lipid Panel     Standing Status:   Future     Standing Expiration Date:   10/28/2021     Order Specific Question:   Is Patient Fasting?/# of Hours     Answer:   Abundio Garcia MD, Gastroenterology, [place]     Referral Priority:   Routine     Referral Type:   Eval and Treat     Referral Reason:   Specialty Services Required     Referred to Provider:   Adeola Zamora MD     Requested Specialty:   Gastroenterology     Number of Visits Requested:   1     No orders of the defined types were placed in this encounter. Return in about 6 months (around 4/28/2021).   Compa Abdullahi MD

## 2020-10-29 ENCOUNTER — TELEPHONE (OUTPATIENT)
Dept: ENDOSCOPY | Age: 65
End: 2020-10-29

## 2020-11-02 ENCOUNTER — HOSPITAL ENCOUNTER (OUTPATIENT)
Dept: WOMENS IMAGING | Age: 65
Discharge: HOME | End: 2020-11-04
Payer: MEDICARE

## 2020-11-02 PROCEDURE — G0279 TOMOSYNTHESIS, MAMMO: HCPCS

## 2020-11-02 RX ORDER — LEVOTHYROXINE SODIUM 0.05 MG/1
TABLET ORAL
Qty: 90 TABLET | Refills: 3 | Status: SHIPPED | OUTPATIENT
Start: 2020-11-02 | End: 2022-09-27

## 2020-11-02 NOTE — TELEPHONE ENCOUNTER
Recent Visits     10/28/2020  Essential hypertension    [facility] AT [facility] and [address], MD

## 2020-11-12 ENCOUNTER — OFFICE VISIT (OUTPATIENT)
Dept: FAMILY MEDICINE CLINIC | Age: 65
End: 2020-11-12
Payer: MEDICARE

## 2020-11-12 ENCOUNTER — HOSPITAL ENCOUNTER (OUTPATIENT)
Age: 65
Setting detail: SPECIMEN
Discharge: HOME | End: 2020-11-12
Payer: MEDICARE

## 2020-11-12 ENCOUNTER — NURSE ONLY (OUTPATIENT)
Dept: PRIMARY CARE CLINIC | Age: 65
End: 2020-11-12

## 2020-11-12 VITALS
WEIGHT: 150 LBS | HEIGHT: 64 IN | TEMPERATURE: 98.9 F | SYSTOLIC BLOOD PRESSURE: 136 MMHG | DIASTOLIC BLOOD PRESSURE: 74 MMHG | BODY MASS INDEX: 25.61 KG/M2 | HEART RATE: 69 BPM | OXYGEN SATURATION: 98 %

## 2020-11-12 PROCEDURE — 11301 SHAVE SKIN LESION 0.6-1.0 CM: CPT | Performed by: FAMILY MEDICINE

## 2020-11-12 PROCEDURE — U0003 INFECTIOUS AGENT DETECTION BY NUCLEIC ACID (DNA OR RNA); SEVERE ACUTE RESPIRATORY SYNDROME CORONAVIRUS 2 (SARS-COV-2) (CORONAVIRUS DISEASE [COVID-19]), AMPLIFIED PROBE TECHNIQUE, MAKING USE OF HIGH THROUGHPUT TECHNOLOGIES AS DESCRIBED BY CMS-2020-01-R: HCPCS

## 2020-11-12 NOTE — PROGRESS NOTES
SUBJECTIVE:   Mynor Curry is a 72 y.o. female who presents for lesion removal. We have already discussed this procedure, including option of not performing surgery, technique of surgery  risk benefit and possible complications including but not limited to potential for excessive scarring, recurrence, excessive bleeding and residual lesion at a recent visit       OBJECTIVE:   Patient appears well. /74 (Site: Left Upper Arm, Position: Sitting, Cuff Size: Medium Adult)   Pulse 69   Temp 98.9 °F (37.2 °C) (Oral)   Ht 5' 4\" (1.626 m)   Wt 150 lb (68 kg)   LMP  (LMP Unknown) Comment: last menses at age 48  SpO2 98%   BMI 25.75 kg/m²     Skin: 6 mm raised verucous lesion    ASSESSMENT:   normal complete skin exam, no suspicious lesions, keratoacanthoma friable size 6 mm noted on the right upper arm    PLAN:   After informed consent was obtained, using Betadine and Alcohol for cleansing and 2% Lidocaine with epinephrine for anesthetic, with sterile technique, shave excision, curettage and electrocautery was performed. Antibiotic dressing is applied, and wound care instructions provided. Be alert for any signs of cutaneous infection. The procedure was well tolerated without complications. Follow up: the specimen is labeled and sent to pathology for evaluation.

## 2020-11-13 DIAGNOSIS — Z01.818 PRE-OP TESTING: ICD-10-CM

## 2020-11-13 LAB
SARS-COV-2: NOT DETECTED
SOURCE: NORMAL

## 2020-11-19 ENCOUNTER — ANCILLARY PROCEDURE (OUTPATIENT)
Dept: ENDOSCOPY | Age: 65
End: 2020-11-19
Attending: INTERNAL MEDICINE
Payer: MEDICARE

## 2020-11-19 ENCOUNTER — HOSPITAL ENCOUNTER (OUTPATIENT)
Age: 65
Setting detail: OUTPATIENT SURGERY
Discharge: HOME | End: 2020-11-19
Attending: INTERNAL MEDICINE | Admitting: INTERNAL MEDICINE
Payer: MEDICARE

## 2020-11-19 ENCOUNTER — ANESTHESIA EVENT (OUTPATIENT)
Dept: ENDOSCOPY | Age: 65
End: 2020-11-19
Payer: MEDICARE

## 2020-11-19 ENCOUNTER — ANESTHESIA (OUTPATIENT)
Dept: ENDOSCOPY | Age: 65
End: 2020-11-19
Payer: MEDICARE

## 2020-11-19 VITALS — DIASTOLIC BLOOD PRESSURE: 54 MMHG | SYSTOLIC BLOOD PRESSURE: 116 MMHG | OXYGEN SATURATION: 90 %

## 2020-11-19 VITALS
DIASTOLIC BLOOD PRESSURE: 74 MMHG | BODY MASS INDEX: 26.05 KG/M2 | HEIGHT: 63 IN | SYSTOLIC BLOOD PRESSURE: 170 MMHG | RESPIRATION RATE: 16 BRPM | WEIGHT: 147 LBS | OXYGEN SATURATION: 95 % | HEART RATE: 61 BPM | TEMPERATURE: 99 F

## 2020-11-19 PROCEDURE — 6370000000 HC RX 637 (ALT 250 FOR IP): Performed by: INTERNAL MEDICINE

## 2020-11-19 PROCEDURE — 2580000003 HC RX 258

## 2020-11-19 PROCEDURE — 7100000010 HC PHASE II RECOVERY - FIRST 15 MIN: Performed by: INTERNAL MEDICINE

## 2020-11-19 PROCEDURE — 2580000003 HC RX 258: Performed by: INTERNAL MEDICINE

## 2020-11-19 PROCEDURE — 2709999900 HC NON-CHARGEABLE SUPPLY: Performed by: INTERNAL MEDICINE

## 2020-11-19 PROCEDURE — 3609027000 HC COLONOSCOPY: Performed by: INTERNAL MEDICINE

## 2020-11-19 PROCEDURE — 6360000002 HC RX W HCPCS: Performed by: NURSE ANESTHETIST, CERTIFIED REGISTERED

## 2020-11-19 PROCEDURE — 3700000000 HC ANESTHESIA ATTENDED CARE: Performed by: INTERNAL MEDICINE

## 2020-11-19 PROCEDURE — 2500000003 HC RX 250 WO HCPCS: Performed by: NURSE ANESTHETIST, CERTIFIED REGISTERED

## 2020-11-19 PROCEDURE — G0105 COLORECTAL SCRN; HI RISK IND: HCPCS | Performed by: INTERNAL MEDICINE

## 2020-11-19 PROCEDURE — 3700000001 HC ADD 15 MINUTES (ANESTHESIA): Performed by: INTERNAL MEDICINE

## 2020-11-19 RX ORDER — LIDOCAINE HYDROCHLORIDE 20 MG/ML
INJECTION, SOLUTION INFILTRATION; PERINEURAL PRN
Status: DISCONTINUED | OUTPATIENT
Start: 2020-11-19 | End: 2020-11-19 | Stop reason: SDUPTHER

## 2020-11-19 RX ORDER — PROPOFOL 10 MG/ML
INJECTION, EMULSION INTRAVENOUS PRN
Status: DISCONTINUED | OUTPATIENT
Start: 2020-11-19 | End: 2020-11-19 | Stop reason: SDUPTHER

## 2020-11-19 RX ORDER — MAGNESIUM HYDROXIDE 1200 MG/15ML
LIQUID ORAL PRN
Status: DISCONTINUED | OUTPATIENT
Start: 2020-11-19 | End: 2020-11-19 | Stop reason: ALTCHOICE

## 2020-11-19 RX ORDER — SODIUM CHLORIDE 9 MG/ML
INJECTION, SOLUTION INTRAVENOUS
Status: COMPLETED
Start: 2020-11-19 | End: 2020-11-19

## 2020-11-19 RX ORDER — SODIUM CHLORIDE 9 MG/ML
INJECTION, SOLUTION INTRAVENOUS CONTINUOUS
Status: DISCONTINUED | OUTPATIENT
Start: 2020-11-19 | End: 2020-11-19 | Stop reason: HOSPADM

## 2020-11-19 RX ORDER — LABETALOL 20 MG/4 ML (5 MG/ML) INTRAVENOUS SYRINGE
PRN
Status: DISCONTINUED | OUTPATIENT
Start: 2020-11-19 | End: 2020-11-19 | Stop reason: SDUPTHER

## 2020-11-19 RX ORDER — ONDANSETRON 2 MG/ML
4 INJECTION INTRAMUSCULAR; INTRAVENOUS
Status: DISCONTINUED | OUTPATIENT
Start: 2020-11-19 | End: 2020-11-19 | Stop reason: HOSPADM

## 2020-11-19 RX ORDER — SIMETHICONE 20 MG/.3ML
EMULSION ORAL PRN
Status: DISCONTINUED | OUTPATIENT
Start: 2020-11-19 | End: 2020-11-19 | Stop reason: ALTCHOICE

## 2020-11-19 RX ADMIN — PROPOFOL 50 MG: 10 INJECTION, EMULSION INTRAVENOUS at 12:38

## 2020-11-19 RX ADMIN — PROPOFOL 40 MG: 10 INJECTION, EMULSION INTRAVENOUS at 12:48

## 2020-11-19 RX ADMIN — PROPOFOL 50 MG: 10 INJECTION, EMULSION INTRAVENOUS at 12:45

## 2020-11-19 RX ADMIN — PROPOFOL 50 MG: 10 INJECTION, EMULSION INTRAVENOUS at 12:39

## 2020-11-19 RX ADMIN — LABETALOL 20 MG/4 ML (5 MG/ML) INTRAVENOUS SYRINGE 5 MG: at 12:42

## 2020-11-19 RX ADMIN — SODIUM CHLORIDE: 9 INJECTION, SOLUTION INTRAVENOUS at 12:07

## 2020-11-19 RX ADMIN — PROPOFOL 50 MG: 10 INJECTION, EMULSION INTRAVENOUS at 12:43

## 2020-11-19 RX ADMIN — PROPOFOL 50 MG: 10 INJECTION, EMULSION INTRAVENOUS at 12:46

## 2020-11-19 RX ADMIN — LIDOCAINE HYDROCHLORIDE 40 MG: 20 INJECTION, SOLUTION INFILTRATION; PERINEURAL at 12:38

## 2020-11-19 RX ADMIN — SODIUM CHLORIDE 500 ML: 9 INJECTION, SOLUTION INTRAVENOUS at 11:58

## 2020-11-19 RX ADMIN — PROPOFOL 50 MG: 10 INJECTION, EMULSION INTRAVENOUS at 12:41

## 2020-11-19 RX ADMIN — PROPOFOL 50 MG: 10 INJECTION, EMULSION INTRAVENOUS at 12:53

## 2020-11-19 RX ADMIN — PROPOFOL 40 MG: 10 INJECTION, EMULSION INTRAVENOUS at 12:50

## 2020-11-19 ASSESSMENT — PULMONARY FUNCTION TESTS
PIF_VALUE: 1
PIF_VALUE: 2
PIF_VALUE: 2
PIF_VALUE: 1
PIF_VALUE: 3
PIF_VALUE: 1
PIF_VALUE: 1

## 2020-11-19 ASSESSMENT — PAIN SCALES - GENERAL
PAINLEVEL_OUTOF10: 0

## 2020-11-19 NOTE — H&P
mouth daily 10/28/20   Lilliam Nguyen MD       Allergies: Allergies   Allergen Reactions    Latex Rash     Tape, bandaids    Oxycodone Nausea And Vomiting     Dose-related, 1/2 tab was okay.     Pcn [Penicillins] Rash     As a child    Tape Mikael Lord Tape] Rash    Tramadol Itching        History of allergic reaction to anesthesia:  No    Past Medical History:  Past Medical History:   Diagnosis Date    Anxiety     Arthritis     Asthma     Cancer ([facility] [address] 4/2015    Right breast cancer metastatic to axillary node    Chronic back pain     Chronic cholecystitis with calculus 2/1/2018    Depression     History of blood transfusion     Hyperlipidemia     Hypertension     Multiple gallstones 10/3/2017    Prolonged emergence from general anesthesia     took 10-13 hours to wake up    SVT (supraventricular tachycardia) ([address]     Thyroid disease        Past Surgical History:  Past Surgical History:   Procedure Laterality Date    BREAST BIOPSY Right 4/16/15    ULTRASOUND GUIDED BIOPSY RIGHT AXILLA, RIGHT AXILLARY DISSECTION,ULTRASOUND GUIDED RIGHT BREAST BIOPSY    CHOLECYSTECTOMY      COLONOSCOPY  9/12/14    polypectomy jarmoszuk    LA LAP,CHOLECYSTECTOMY/GRAPH N/A 4/13/2018    LAPAROSCOPIC CHOLECYSTECTOMY WITH GRAMS performed by Gayathri Grace MD at [address] W/SUBQ PORT/ CTR/PRPH INSJ N/A 2/21/2017    PORT REMOVAL performed by Gem Gallo MD at [address]      tail bone    TONSILLECTOMY      TUBAL LIGATION      TUNNELED VENOUS PORT PLACEMENT Left 06/02/15    SUBCLAVIAN VEIN    WRIST GANGLION EXCISION Left        Social History:  Social History     Tobacco Use    Smoking status: Never Smoker    Smokeless tobacco: Never Used   Substance Use Topics    Alcohol use: No    Drug use: No       Vital Signs:   Vitals:    11/19/20 1119   BP: (!) 163/72   Pulse: 54   Resp: 18   Temp: 99 °F (37.2 °C)   SpO2: 96%        Physical Exam:  Cardiac:  [x]WNL []Comments:  Pulmonary:  [x]WNL   []Comments:   Neuro/Mental Status:  [x]WNL  []Comments:  Abdominal:  [x]WNL    []Comments:  Other:   []WNL  []Comments:    Informed Consent:  The risks and benefits of the procedure have been discussed with either the patient or if they cannot consent, their representative. Assessment:  Patient examined and appropriate for planned sedation and procedure. Plan:  Proceed with planned sedation and procedure as above. The patient was counseled at length about risks of pierce COVID-19 in the perioperative and any recovery window from the procedure. The patient was made aware that pierce COVID-19 may worsen their prognosis for recovery from their procedure and lend to a higher morbidity and-all mortality risk. The patient was given the option of postponing the procedure all material risks, benefits, and alternatives were discussed. The patient does wish to proceed with the procedure at this time.     Sahara Fong MD  12:58 PM

## 2020-11-19 NOTE — ANESTHESIA POSTPROCEDURE EVALUATION
Department of Anesthesiology  Postprocedure Note    Patient: Whitney Jeffries  MRN: [id number]  YOB: 1955  Date of evaluation: 11/19/2020  Time:  1:00 PM     Procedure Summary     Date:  11/19/20 Room / Location:  [address] [place] [address] / [facility]    Anesthesia Start:  8777 Anesthesia Stop:  1300    Procedure:  COLORECTAL CANCER SCREENING, HIGH RISK (N/A ) Diagnosis:  (History of colon polyps Z86.010)    Surgeon:  Luis Wright MD Responsible Provider:  YOEL Simon CRNA    Anesthesia Type:  MAC ASA Status:  3          Anesthesia Type: MAC    Carly Phase I: Carly Score: 10    Carly Phase II:      Last vitals: Reviewed and per EMR flowsheets.        Anesthesia Post Evaluation    Patient location during evaluation: PACU  Patient participation: waiting for patient participation  Level of consciousness: sleepy but conscious  Pain score: 0  Airway patency: patent  Nausea & Vomiting: no nausea and no vomiting  Complications: no  Cardiovascular status: blood pressure returned to baseline and hemodynamically stable  Respiratory status: acceptable  Hydration status: euvolemic

## 2020-11-19 NOTE — ANESTHESIA PRE PROCEDURE
Department of Anesthesiology  Preprocedure Note       Name:  Kami Oneill   Age:  72 y.o.  :  1955                                          MRN:  [id number]         Date:  2020      Surgeon: Ed Ojeda):  Samir Whitten MD    Procedure: Procedure(s):  COLORECTAL CANCER SCREENING, HIGH RISK    Medications prior to admission:   Prior to Admission medications    Medication Sig Start Date End Date Taking? Authorizing Provider   levothyroxine (SYNTHROID) 50 MCG tablet take 1 tablet by mouth once daily 20   [place] Dancer, MD   levothyroxine (SYNTHROID) 75 MCG tablet Take 1 tablet by mouth daily 10/28/20   [place] Dancer, MD   albuterol sulfate  (90 Base) MCG/ACT inhaler Inhale 2 puffs into the lungs every 6 hours as needed for Wheezing 20   [place] Dancer, MD   pravastatin (PRAVACHOL) 20 MG tablet Take 1 tablet by mouth daily 20   [place] Dancer, MD   sertraline (ZOLOFT) 100 MG tablet Take 1 tablet by mouth daily 20   [place] Dancer, MD   verapamil (CALAN SR) 180 MG extended release tablet Take 1 tablet by mouth nightly 20   [place] Dancer, MD   [facility] NEUROMEDICAL [facility] PUMP 1 Device as needed (arm swelling) 19   YOEL Stark - CNP       Current medications:    Current Facility-Administered Medications   Medication Dose Route Frequency Provider Last Rate Last Dose    ondansetron (ZOFRAN) injection 4 mg  4 mg Intravenous Once PRN Samir Whitten MD        0.9 % sodium chloride infusion   Intravenous Continuous Daivd Area, MD           Allergies: Allergies   Allergen Reactions    Latex Rash     Tape, bandaids    Oxycodone Nausea And Vomiting     Dose-related, 1/2 tab was okay.     Pcn [Penicillins] Rash     As a child    Tape Rojelio Noris Tape] Rash    Tramadol Itching       Problem List:    Patient Active Problem List   Diagnosis Code    Hypertension I10    Hyperlipidemia E78.5    Asthma J45.909    Anxiety F41.9    Depression F32.9    Chronic back pain M54.9, G89.29    SVT (supraventricular tachycardia) (HCC) I47.1    Hypothyroid E03.9    Colon polyps K63.5    Malignant neoplasm of axillary 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 (HCC) C50.611    Epigastric abdominal pain R10.13    Lumbar spine painful on movement M54.5    Degeneration, intervertebral disc, lumbar M51.36    Multiple gallstones K80.20    Chronic cholecystitis with calculus K80.10    Rib pain on right side R07.81    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    Estrogen receptor negative status (ER-) Z17.1    Lobular carcinoma in situ of right breast D05.01    Personal history of malignant neoplasm of breast Z85.3    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       Past Medical History:        Diagnosis Date    Anxiety     Arthritis     Asthma     Cancer ([address] 4/2015    Right breast cancer metastatic to axillary node    Chronic back pain     Chronic cholecystitis with calculus 2/1/2018    Depression     History of blood transfusion     Hyperlipidemia     Hypertension     Multiple gallstones 10/3/2017    Prolonged emergence from general anesthesia     took 10-13 hours to wake up    SVT (supraventricular tachycardia) ([address]     Thyroid disease        Past Surgical History:        Procedure Laterality Date    BREAST BIOPSY Right 4/16/15    ULTRASOUND GUIDED BIOPSY RIGHT AXILLA, RIGHT AXILLARY DISSECTION,ULTRASOUND GUIDED RIGHT BREAST BIOPSY    CHOLECYSTECTOMY      COLONOSCOPY  9/12/14    polypectomy ger    [place] LAP,CHOLECYSTECTOMY/GRAPH N/A 4/13/2018    LAPAROSCOPIC CHOLECYSTECTOMY WITH GRAMS performed by Viral Gu MD at [address] [facility] [address] [facility] W/SUBQ PORT/ CTR/PRPH INSJ N/A 2/21/2017    PORT REMOVAL performed by Leyda Núñez MD at [address] [facility] bone    TONSILLECTOMY      TUBAL LIGATION      TUNNELED VENOUS PORT PLACEMENT Left 06/02/15    SUBCLAVIAN VEIN    WRIST GANGLION EXCISION Left        Social History: Social History     Tobacco Use    Smoking status: Never Smoker    Smokeless tobacco: Never Used   Substance Use Topics    Alcohol use: No                                Counseling given: Not Answered      Vital Signs (Current): There were no vitals filed for this visit. BP Readings from Last 3 Encounters:   11/12/20 136/74   10/28/20 (!) 142/70   03/04/20 (!) 147/81       NPO Status:                                                                                 BMI:   Wt Readings from Last 3 Encounters:   11/12/20 150 lb (68 kg)   10/28/20 150 lb (68 kg)   03/04/20 155 lb (70.3 kg)     There is no height or weight on file to calculate BMI.    CBC:   Lab Results   Component Value Date    WBC 4.6 10/28/2020    RBC 4.49 10/28/2020    RBC 4.85 09/18/2011    HGB 13.6 10/28/2020    HCT 41.2 10/28/2020    MCV 92.0 10/28/2020    RDW 12.8 10/28/2020     10/28/2020       CMP:   Lab Results   Component Value Date     10/28/2020    K 3.7 10/28/2020     10/28/2020    CO2 24 10/28/2020    BUN 23 10/28/2020    CREATININE 0.63 10/28/2020    GFRAA >60.0 10/28/2020    LABGLOM >60.0 10/28/2020    GLUCOSE 102 10/28/2020    PROT 7.4 10/28/2020    CALCIUM 9.6 10/28/2020    BILITOT 0.3 10/28/2020    ALKPHOS 96 10/28/2020    AST 22 10/28/2020    ALT 14 10/28/2020       POC Tests: No results for input(s): POCGLU, POCNA, POCK, POCCL, POCBUN, POCHEMO, POCHCT in the last 72 hours.     Coags:   Lab Results   Component Value Date    PROTIME 12.5 03/04/2020    INR 0.9 03/04/2020    APTT 27.8 12/01/2014       HCG (If Applicable): No results found for: PREGTESTUR, PREGSERUM, HCG, HCGQUANT     ABGs: No results found for: PHART, PO2ART, GIY9ZSR, HOS9VQO, BEART, B2ODFLVE     Type & Screen (If Applicable):  No results found for: LABABO, LABRH    Drug/Infectious Status (If Applicable):  No results found for: HIV, HEPCAB    COVID-19 Screening (If Applicable):   Lab Results   Component Value Date    COVID19 Not Detected 11/12/2020         Anesthesia Evaluation  Patient summary reviewed and Nursing notes reviewed history of anesthetic complications:   Airway: Mallampati: II  TM distance: >3 FB   Neck ROM: full  Mouth opening: > = 3 FB Dental:          Pulmonary:normal exam    (+) asthma:           Patient did not smoke on day of surgery. Cardiovascular:    (+) hypertension:, dysrhythmias: SVT, hyperlipidemia         Beta Blocker:  Not on Beta Blocker         Neuro/Psych:   (+) psychiatric history:depression/anxiety             GI/Hepatic/Renal:   (+) bowel prep,          ROS comment: Hx of colon polyp. Endo/Other:    (+) hypothyroidism::., .          Pt had no PAT visit        ROS comment: Lymphedema pump  Rt breast cancer  Prolonged emergence from GA Abdominal:           Vascular:                                      Anesthesia Plan      MAC     ASA 3       Induction: intravenous. Anesthetic plan and risks discussed with patient. Plan discussed with attending.                   YOEL Brennan - JESIKA   11/19/2020

## 2020-11-24 ENCOUNTER — APPOINTMENT (OUTPATIENT)
Dept: ULTRASOUND IMAGING | Age: 65
End: 2020-11-24
Payer: MEDICARE

## 2021-11-04 ENCOUNTER — HOSPITAL ENCOUNTER (OUTPATIENT)
Dept: WOMENS IMAGING | Age: 66
Discharge: HOME | End: 2021-11-06
Payer: MEDICARE

## 2021-11-04 ENCOUNTER — APPOINTMENT (OUTPATIENT)
Dept: ULTRASOUND IMAGING | Age: 66
End: 2021-11-04
Payer: MEDICARE

## 2021-11-04 VITALS — BODY MASS INDEX: 25.63 KG/M2 | HEIGHT: 64 IN

## 2021-11-04 DIAGNOSIS — Z85.3 PERSONAL HISTORY OF MALIGNANT NEOPLASM OF BREAST: ICD-10-CM

## 2021-11-04 PROCEDURE — G0279 TOMOSYNTHESIS, MAMMO: HCPCS

## 2022-09-19 ENCOUNTER — APPOINTMENT (OUTPATIENT)
Dept: GENERAL RADIOLOGY | Age: 67
End: 2022-09-19
Payer: MEDICARE

## 2022-09-19 ENCOUNTER — APPOINTMENT (OUTPATIENT)
Dept: CT IMAGING | Age: 67
End: 2022-09-19
Payer: MEDICARE

## 2022-09-19 ENCOUNTER — HOSPITAL ENCOUNTER (EMERGENCY)
Age: 67
Discharge: HOME | End: 2022-09-19
Attending: EMERGENCY MEDICINE
Payer: MEDICARE

## 2022-09-19 VITALS
BODY MASS INDEX: 27.46 KG/M2 | TEMPERATURE: 97.8 F | SYSTOLIC BLOOD PRESSURE: 138 MMHG | WEIGHT: 155 LBS | DIASTOLIC BLOOD PRESSURE: 85 MMHG | HEART RATE: 101 BPM | RESPIRATION RATE: 29 BRPM | OXYGEN SATURATION: 91 % | HEIGHT: 63 IN

## 2022-09-19 DIAGNOSIS — R41.0 CONFUSION: Primary | ICD-10-CM

## 2022-09-19 DIAGNOSIS — N30.00 ACUTE CYSTITIS WITHOUT HEMATURIA: ICD-10-CM

## 2022-09-19 LAB
ACETAMINOPHEN LEVEL: <5 UG/ML (ref 10–30)
ALBUMIN SERPL-MCNC: 3.5 G/DL (ref 3.5–4.6)
ALP BLD-CCNC: 308 U/L (ref 40–130)
ALT SERPL-CCNC: 286 U/L (ref 0–33)
AMPHETAMINE SCREEN, URINE: NORMAL
ANION GAP SERPL CALCULATED.3IONS-SCNC: 13 MEQ/L (ref 9–15)
AST SERPL-CCNC: 209 U/L (ref 0–35)
BACTERIA: NEGATIVE /HPF
BARBITURATE SCREEN URINE: NORMAL
BASOPHILS ABSOLUTE: 0 K/UL (ref 0–0.2)
BASOPHILS RELATIVE PERCENT: 0.6 %
BENZODIAZEPINE SCREEN, URINE: NORMAL
BILIRUB SERPL-MCNC: 0.3 MG/DL (ref 0.2–0.7)
BILIRUBIN URINE: NEGATIVE
BLOOD, URINE: ABNORMAL
BUN BLDV-MCNC: 15 MG/DL (ref 8–23)
CALCIUM SERPL-MCNC: 9.3 MG/DL (ref 8.5–9.9)
CANNABINOID SCREEN URINE: NORMAL
CHLORIDE BLD-SCNC: 97 MEQ/L (ref 95–107)
CHOLESTEROL, TOTAL: 128 MG/DL (ref 0–199)
CHP ED QC CHECK: YES
CLARITY: CLEAR
CO2: 22 MEQ/L (ref 20–31)
COCAINE METABOLITE SCREEN URINE: NORMAL
COLOR: YELLOW
CREAT SERPL-MCNC: 0.56 MG/DL (ref 0.5–0.9)
EOSINOPHILS ABSOLUTE: 0.1 K/UL (ref 0–0.7)
EOSINOPHILS RELATIVE PERCENT: 1.4 %
EPITHELIAL CELLS, UA: ABNORMAL /HPF (ref 0–5)
ETHANOL PERCENT: NORMAL G/DL
ETHANOL: <10 MG/DL (ref 0–0.08)
FENTANYL SCREEN, URINE: NORMAL
GFR AFRICAN AMERICAN: >60
GFR NON-AFRICAN AMERICAN: >60
GLOBULIN: 3.3 G/DL (ref 2.3–3.5)
GLUCOSE BLD-MCNC: 109 MG/DL (ref 70–99)
GLUCOSE BLD-MCNC: 110 MG/DL
GLUCOSE BLD-MCNC: 110 MG/DL (ref 70–99)
GLUCOSE URINE: NEGATIVE MG/DL
HCT VFR BLD CALC: 38.4 % (ref 37–47)
HDLC SERPL-MCNC: 40 MG/DL (ref 40–59)
HEMOGLOBIN: 13.1 G/DL (ref 12–16)
HYALINE CASTS: ABNORMAL /HPF (ref 0–5)
INR BLD: 1
KETONES, URINE: 15 MG/DL
LACTIC ACID: 1.2 MMOL/L (ref 0.5–2.2)
LDL CHOLESTEROL CALCULATED: 67 MG/DL (ref 0–129)
LEUKOCYTE ESTERASE, URINE: ABNORMAL
LYMPHOCYTES ABSOLUTE: 1 K/UL (ref 1–4.8)
LYMPHOCYTES RELATIVE PERCENT: 17.1 %
Lab: NORMAL
MCH RBC QN AUTO: 30 PG (ref 27–31.3)
MCHC RBC AUTO-ENTMCNC: 34 % (ref 33–37)
MCV RBC AUTO: 88 FL (ref 82–100)
METHADONE SCREEN, URINE: NORMAL
MONOCYTES ABSOLUTE: 0.9 K/UL (ref 0.2–0.8)
MONOCYTES RELATIVE PERCENT: 15.1 %
NEUTROPHILS ABSOLUTE: 3.8 K/UL (ref 1.4–6.5)
NEUTROPHILS RELATIVE PERCENT: 65.8 %
NITRITE, URINE: NEGATIVE
OPIATE SCREEN URINE: NORMAL
OXYCODONE URINE: NORMAL
PDW BLD-RTO: 13.4 % (ref 11.5–14.5)
PERFORMED ON: ABNORMAL
PH UA: 7.5 (ref 5–9)
PHENCYCLIDINE SCREEN URINE: NORMAL
PLATELET # BLD: 255 K/UL (ref 130–400)
POTASSIUM SERPL-SCNC: 3.8 MEQ/L (ref 3.4–4.9)
PROPOXYPHENE SCREEN: NORMAL
PROTEIN UA: NEGATIVE MG/DL
PROTHROMBIN TIME: 13.1 SEC (ref 12.3–14.9)
RBC # BLD: 4.37 M/UL (ref 4.2–5.4)
RBC UA: ABNORMAL /HPF (ref 0–5)
REASON FOR REJECTION: NORMAL
REJECTED TEST: NORMAL
SALICYLATE, SERUM: <0.3 MG/DL (ref 15–30)
SODIUM BLD-SCNC: 132 MEQ/L (ref 135–144)
SPECIFIC GRAVITY UA: 1.02 (ref 1–1.03)
TOTAL CK: 18 U/L (ref 0–170)
TOTAL PROTEIN: 6.8 G/DL (ref 6.3–8)
TRIGL SERPL-MCNC: 107 MG/DL (ref 0–150)
TSH SERPL DL<=0.05 MIU/L-ACNC: 4.16 UIU/ML (ref 0.44–3.86)
URINE REFLEX TO CULTURE: YES
UROBILINOGEN, URINE: 0.2 E.U./DL
WBC # BLD: 5.7 K/UL (ref 4.8–10.8)
WBC UA: ABNORMAL /HPF (ref 0–5)

## 2022-09-19 PROCEDURE — 36415 COLL VENOUS BLD VENIPUNCTURE: CPT

## 2022-09-19 PROCEDURE — 82077 ASSAY SPEC XCP UR&BREATH IA: CPT

## 2022-09-19 PROCEDURE — 84443 ASSAY THYROID STIM HORMONE: CPT

## 2022-09-19 PROCEDURE — 80179 DRUG ASSAY SALICYLATE: CPT

## 2022-09-19 PROCEDURE — 87086 URINE CULTURE/COLONY COUNT: CPT

## 2022-09-19 PROCEDURE — 73610 X-RAY EXAM OF ANKLE: CPT

## 2022-09-19 PROCEDURE — 96374 THER/PROPH/DIAG INJ IV PUSH: CPT

## 2022-09-19 PROCEDURE — 6360000002 HC RX W HCPCS: Performed by: EMERGENCY MEDICINE

## 2022-09-19 PROCEDURE — 86403 PARTICLE AGGLUT ANTBDY SCRN: CPT

## 2022-09-19 PROCEDURE — 73630 X-RAY EXAM OF FOOT: CPT

## 2022-09-19 PROCEDURE — 80307 DRUG TEST PRSMV CHEM ANLYZR: CPT

## 2022-09-19 PROCEDURE — 93005 ELECTROCARDIOGRAM TRACING: CPT | Performed by: EMERGENCY MEDICINE

## 2022-09-19 PROCEDURE — 85025 COMPLETE CBC W/AUTO DIFF WBC: CPT

## 2022-09-19 PROCEDURE — 80143 DRUG ASSAY ACETAMINOPHEN: CPT

## 2022-09-19 PROCEDURE — 6370000000 HC RX 637 (ALT 250 FOR IP): Performed by: EMERGENCY MEDICINE

## 2022-09-19 PROCEDURE — 83605 ASSAY OF LACTIC ACID: CPT

## 2022-09-19 PROCEDURE — 85610 PROTHROMBIN TIME: CPT

## 2022-09-19 PROCEDURE — 80053 COMPREHEN METABOLIC PANEL: CPT

## 2022-09-19 PROCEDURE — 82550 ASSAY OF CK (CPK): CPT

## 2022-09-19 PROCEDURE — 99285 EMERGENCY DEPT VISIT HI MDM: CPT

## 2022-09-19 PROCEDURE — 81001 URINALYSIS AUTO W/SCOPE: CPT

## 2022-09-19 PROCEDURE — 80061 LIPID PANEL: CPT

## 2022-09-19 PROCEDURE — 70450 CT HEAD/BRAIN W/O DYE: CPT

## 2022-09-19 RX ORDER — SULFAMETHOXAZOLE AND TRIMETHOPRIM 800; 160 MG/1; MG/1
1 TABLET ORAL ONCE
Status: COMPLETED | OUTPATIENT
Start: 2022-09-19 | End: 2022-09-19

## 2022-09-19 RX ORDER — SULFAMETHOXAZOLE AND TRIMETHOPRIM 800; 160 MG/1; MG/1
1 TABLET ORAL 2 TIMES DAILY
Qty: 14 TABLET | Refills: 0 | Status: SHIPPED | OUTPATIENT
Start: 2022-09-19 | End: 2022-09-26

## 2022-09-19 RX ORDER — KETOROLAC TROMETHAMINE 30 MG/ML
30 INJECTION, SOLUTION INTRAMUSCULAR; INTRAVENOUS ONCE
Status: COMPLETED | OUTPATIENT
Start: 2022-09-19 | End: 2022-09-19

## 2022-09-19 RX ADMIN — SULFAMETHOXAZOLE AND TRIMETHOPRIM 1 TABLET: 800; 160 TABLET ORAL at 18:52

## 2022-09-19 RX ADMIN — KETOROLAC TROMETHAMINE 30 MG: 30 INJECTION, SOLUTION INTRAMUSCULAR; INTRAVENOUS at 17:11

## 2022-09-19 ASSESSMENT — PAIN - FUNCTIONAL ASSESSMENT
PAIN_FUNCTIONAL_ASSESSMENT: NONE - DENIES PAIN
PAIN_FUNCTIONAL_ASSESSMENT: NONE - DENIES PAIN

## 2022-09-19 ASSESSMENT — PAIN SCALES - GENERAL: PAINLEVEL_OUTOF10: 10

## 2022-09-19 NOTE — ED TRIAGE NOTES
Pt to ED from [facility]. Pt unsure about why she is at the nursing home and why she was brought to ED>   Pt reports history of pain to bilateral feet and difficulty with ambulation. Pt denies new injury or trauma  Pt calm and cooperative but has offers rambling answers to questions that require refocus and redirection. Pt reports history of depression and is unsure what meds were given at the nursing home  Pt respirations even and unlabored.  No s.s of acute distress noted

## 2022-09-19 NOTE — ED PROVIDER NOTES
[address] [facility] ED  EMERGENCY DEPARTMENT ENCOUNTER      Pt Name: Aris Ellison  MRN: [id number]  Milkagfhusam 1955  Date of evaluation: 9/19/2022  Provider: German Abraham DO    CHIEF COMPLAINT       Chief Complaint   Patient presents with    Altered Mental Status     AMS declining over the last 3 days. Pt alert to self and place. Reoriented pt to date but did know the president         HISTORY OF PRESENT ILLNESS   (Location/Symptom, Timing/Onset, Context/Setting, Quality, Duration, Modifying Factors, Severity)  Note limiting factors. Aris Ellison is a 79 y.o. female who presents to the emergency department. Patient sent here from [facility] with altered mental status. Apparently patient was placed at [facility] on 9/16/2022 because she had such severe gout she could not walk. Yesterday the nurse states that she was alert and oriented x3 and today she is completely confused. Patient does have history of breast cancer with mets to the axillary node. Her sister came later and states that she has Bipolar disorder and isn't sure if nursing home is giving her her medication. HPI    Nursing Notes were reviewed. REVIEW OF SYSTEMS    (2-9 systems for level 4, 10 or more for level 5)     Review of Systems   Unable to perform ROS: Mental status change     Except as noted above the remainder of the review of systems was reviewed and negative.        PAST MEDICAL HISTORY     Past Medical History:   Diagnosis Date    Anxiety     Arthritis     Asthma     Breast cancer ([address]     Cancer ([address] 4/2015    Right breast cancer metastatic to axillary node    Chronic back pain     Chronic cholecystitis with calculus 2/1/2018    Depression     History of blood transfusion     History of therapeutic radiation     Hyperlipidemia     Hypertension     Multiple gallstones 10/3/2017    Prolonged emergence from general anesthesia     took 10-13 hours to wake up    SVT (supraventricular tachycardia) ([address] Thyroid disease          SURGICAL HISTORY       Past Surgical History:   Procedure Laterality Date    BREAST BIOPSY Right 4/16/15    ULTRASOUND GUIDED BIOPSY RIGHT AXILLA, RIGHT AXILLARY DISSECTION,ULTRASOUND GUIDED RIGHT BREAST BIOPSY    BREAST LUMPECTOMY      CHOLECYSTECTOMY      COLONOSCOPY  9/12/14    polypectomy jarmoszuk    COLONOSCOPY N/A 11/19/2020    COLORECTAL CANCER SCREENING, HIGH RISK performed by Sofia Nazario MD at [place] [address] LAP,CHOLECYSTECTOMY/GRAPH N/A 4/13/2018    LAPAROSCOPIC CHOLECYSTECTOMY WITH GRAMS performed by Camilo Samuels MD at [address] [facility] [address] VAD W/SUBQ PORT/ CTR/PRPH INSJ N/A 2/21/2017    PORT REMOVAL performed by Kamilla Marks MD at . [facility]      tail bone    TONSILLECTOMY      TUBAL LIGATION      TUNNELED VENOUS PORT PLACEMENT Left 06/02/15    SUBCLAVIAN VEIN    WRIST GANGLION EXCISION Left          CURRENT MEDICATIONS       Previous Medications    ALBUTEROL SULFATE  (90 BASE) MCG/ACT INHALER    Inhale 2 puffs into the lungs every 6 hours as needed for Wheezing    LEVOTHYROXINE (SYNTHROID) 50 MCG TABLET    take 1 tablet by mouth once daily    LEVOTHYROXINE (SYNTHROID) 75 MCG TABLET    Take 1 tablet by mouth daily    LYMPHEDEMA PUMP    1 Device as needed (arm swelling)    PRAVASTATIN (PRAVACHOL) 20 MG TABLET    Take 1 tablet by mouth daily    SERTRALINE (ZOLOFT) 100 MG TABLET    Take 1 tablet by mouth daily    VERAPAMIL (CALAN SR) 180 MG EXTENDED RELEASE TABLET    Take 1 tablet by mouth nightly       ALLERGIES     Latex, Oxycodone, Pcn [penicillins], Tape [adhesive tape], and Tramadol    FAMILY HISTORY       Family History   Problem Relation Age of Onset    Heart Disease Mother     Colon Cancer Father     Colon Polyps Father     Other Father 58        Post Op    Other Brother         Accidental Shooting    Brain Cancer Child     Breast Cancer Maternal Grandmother           SOCIAL HISTORY       Social History     Socioeconomic History Marital status:      Spouse name: None    Number of children: None    Years of education: None    Highest education level: None   Tobacco Use    Smoking status: Never    Smokeless tobacco: Never   Vaping Use    Vaping Use: Never used   Substance and Sexual Activity    Alcohol use: No    Drug use: No    Sexual activity: Not Currently       SCREENINGS        Fraser Coma Scale  Eye Opening: Spontaneous  Best Verbal Response: Confused  Best Motor Response: Obeys commands  Dion Coma Scale Score: 14               PHYSICAL EXAM    (up to 7 for level 4, 8 or more for level 5)     ED Triage Vitals [09/19/22 1443]   BP Temp Temp Source Heart Rate Resp SpO2 Height Weight   138/83 (!) 95.5 °F (35.3 °C) Temporal 92 22 99 % 5' 3\" (1.6 m) 155 lb (70.3 kg)       Physical Exam  Constitutional:       General: She is not in acute distress. Appearance: She is well-developed. She is not diaphoretic. HENT:      Head: Normocephalic and atraumatic. Right Ear: External ear normal.      Left Ear: External ear normal.      Nose: Nose normal.      Mouth/Throat:      Mouth: Mucous membranes are moist.      Pharynx: No oropharyngeal exudate. Eyes:      Extraocular Movements: Extraocular movements intact. Conjunctiva/sclera: Conjunctivae normal.      Pupils: Pupils are equal, round, and reactive to light. Neck:      Thyroid: No thyromegaly. Vascular: No JVD. Trachea: No tracheal deviation. Cardiovascular:      Rate and Rhythm: Normal rate. Heart sounds: Normal heart sounds. No murmur heard. Pulmonary:      Effort: Pulmonary effort is normal. No respiratory distress. Breath sounds: Normal breath sounds. No wheezing. Abdominal:      General: Bowel sounds are normal.      Palpations: Abdomen is soft. Tenderness: There is no abdominal tenderness. There is no guarding. Musculoskeletal:         General: Normal range of motion. Cervical back: Normal range of motion and neck supple. Right lower leg: No edema. Left lower leg: No edema. Skin:     General: Skin is warm and dry. Findings: No rash. Neurological:      Mental Status: She is alert and oriented to person, place, and time. Cranial Nerves: No cranial nerve deficit. Psychiatric:         Behavior: Behavior normal.       DIAGNOSTIC RESULTS     EKG: All EKG's are interpreted by the Emergency Department Physician who either signs or Co-signs this chart in the absence of a cardiologist.    Normal sinus rhythm 97 bpm no acute ischemia or ectopy    RADIOLOGY:   Non-plain film images such as CT, Ultrasound and MRI are read by the radiologist. Plain radiographic images are visualized and preliminarily interpreted by the emergency physician with the below findings:    Right ankle x-ray no fractures or dislocations  Right foot x-ray no fracture or dislocations    Interpretation per the Radiologist below, if available at the time of this note:    XR FOOT RIGHT (MIN 3 VIEWS)   Final Result   No acute osseous abnormality. Degenerative changes MTP joint great toe Ng cuneiform metatarsal joint spaces. CT Head WO Contrast   Final Result   No acute intracranial abnormality.          XR ANKLE RIGHT (MIN 3 VIEWS)    (Results Pending)         ED BEDSIDE ULTRASOUND:   Performed by ED Physician - none    LABS:  Labs Reviewed   CBC WITH AUTO DIFFERENTIAL - Abnormal; Notable for the following components:       Result Value    Monocytes Absolute 0.9 (*)     All other components within normal limits   ACETAMINOPHEN LEVEL - Abnormal; Notable for the following components:    Acetaminophen Level <5 (*)     All other components within normal limits   SALICYLATE LEVEL - Abnormal; Notable for the following components:    Salicylate, Serum <1.1 (*)     All other components within normal limits   URINALYSIS WITH REFLEX TO CULTURE - Abnormal; Notable for the following components:    Ketones, Urine 15 (*)     Blood, Urine TRACE (*) [address] [facility]  [phone]      As needed    DISCHARGE MEDICATIONS:  New Prescriptions    SULFAMETHOXAZOLE-TRIMETHOPRIM (BACTRIM DS) 800-160 MG PER TABLET    Take 1 tablet by mouth 2 times daily for 7 days     Controlled Substances Monitoring:     RX Monitoring 3/4/2020   Attestation -   Acute Pain Prescriptions Not required given exclusionary diagnoses. .. Periodic Controlled Substance Monitoring No signs of potential drug abuse or diversion identified.        (Please note that portions of this note were completed with a voice recognition program.  Efforts were made to edit the dictations but occasionally words are mis-transcribed.)    Soto Vazquez DO (electronically signed)  Attending Emergency Physician            Soto Vazquez DO  09/19/22 [id number]

## 2022-09-19 NOTE — ED NOTES
Pt back to [facility]. Pt speech clear, she is awake and alert and denies needs. PT unable to explain admission to NH. She reports recent right arm lymphectomy.  Right arm Restrick     Johan RaoUpper Alleghen[facility] System  09/19/22 7083

## 2022-09-20 ENCOUNTER — HOSPITAL ENCOUNTER (EMERGENCY)
Age: 67
Discharge: NURSING, REHAB OR LONG-TERM CARE | End: 2022-09-20
Payer: MEDICARE

## 2022-09-20 VITALS
TEMPERATURE: 97.8 F | WEIGHT: 155 LBS | DIASTOLIC BLOOD PRESSURE: 72 MMHG | RESPIRATION RATE: 18 BRPM | OXYGEN SATURATION: 99 % | SYSTOLIC BLOOD PRESSURE: 148 MMHG | HEART RATE: 96 BPM | BODY MASS INDEX: 27.46 KG/M2

## 2022-09-20 DIAGNOSIS — R79.89 ELEVATED LFTS: ICD-10-CM

## 2022-09-20 DIAGNOSIS — E86.0 DEHYDRATION: Primary | ICD-10-CM

## 2022-09-20 DIAGNOSIS — Z87.440 HX: UTI (URINARY TRACT INFECTION): ICD-10-CM

## 2022-09-20 DIAGNOSIS — R00.0 SINUS TACHYCARDIA: ICD-10-CM

## 2022-09-20 LAB
ALBUMIN SERPL-MCNC: 3.6 G/DL (ref 3.5–4.6)
ALP BLD-CCNC: 281 U/L (ref 40–130)
ALT SERPL-CCNC: 220 U/L (ref 0–33)
ANION GAP SERPL CALCULATED.3IONS-SCNC: 14 MEQ/L (ref 9–15)
AST SERPL-CCNC: 111 U/L (ref 0–35)
BACTERIA: NEGATIVE /HPF
BASOPHILS ABSOLUTE: 0 K/UL (ref 0–0.2)
BASOPHILS RELATIVE PERCENT: 0.5 %
BILIRUB SERPL-MCNC: <0.2 MG/DL (ref 0.2–0.7)
BILIRUBIN URINE: NEGATIVE
BLOOD, URINE: NEGATIVE
BUN BLDV-MCNC: 16 MG/DL (ref 8–23)
CALCIUM SERPL-MCNC: 9 MG/DL (ref 8.5–9.9)
CHLORIDE BLD-SCNC: 100 MEQ/L (ref 95–107)
CLARITY: ABNORMAL
CO2: 22 MEQ/L (ref 20–31)
COLOR: ABNORMAL
CREAT SERPL-MCNC: 0.72 MG/DL (ref 0.5–0.9)
EKG ATRIAL RATE: 97 BPM
EKG P AXIS: 56 DEGREES
EKG P-R INTERVAL: 126 MS
EKG Q-T INTERVAL: 346 MS
EKG QRS DURATION: 72 MS
EKG QTC CALCULATION (BAZETT): 439 MS
EKG R AXIS: 9 DEGREES
EKG T AXIS: 46 DEGREES
EKG VENTRICULAR RATE: 97 BPM
EOSINOPHILS ABSOLUTE: 0 K/UL (ref 0–0.7)
EOSINOPHILS RELATIVE PERCENT: 0.2 %
EPITHELIAL CELLS, UA: NORMAL /HPF (ref 0–5)
GFR AFRICAN AMERICAN: >60
GFR NON-AFRICAN AMERICAN: >60
GLOBULIN: 3.3 G/DL (ref 2.3–3.5)
GLUCOSE BLD-MCNC: 118 MG/DL (ref 70–99)
GLUCOSE URINE: NEGATIVE MG/DL
HCT VFR BLD CALC: 40.5 % (ref 37–47)
HEMOGLOBIN: 13.6 G/DL (ref 12–16)
HYALINE CASTS: NORMAL /LPF (ref 0–5)
KETONES, URINE: 15 MG/DL
LACTIC ACID: 1.4 MMOL/L (ref 0.5–2.2)
LEUKOCYTE ESTERASE, URINE: NEGATIVE
LYMPHOCYTES ABSOLUTE: 0.9 K/UL (ref 1–4.8)
LYMPHOCYTES RELATIVE PERCENT: 12.3 %
MCH RBC QN AUTO: 29.5 PG (ref 27–31.3)
MCHC RBC AUTO-ENTMCNC: 33.7 % (ref 33–37)
MCV RBC AUTO: 87.7 FL (ref 82–100)
MONOCYTES ABSOLUTE: 0.9 K/UL (ref 0.2–0.8)
MONOCYTES RELATIVE PERCENT: 12.5 %
NEUTROPHILS ABSOLUTE: 5.3 K/UL (ref 1.4–6.5)
NEUTROPHILS RELATIVE PERCENT: 74.5 %
NITRITE, URINE: NEGATIVE
ORGANISM: ABNORMAL
PDW BLD-RTO: 13.8 % (ref 11.5–14.5)
PH UA: 5.5 (ref 5–9)
PLATELET # BLD: 284 K/UL (ref 130–400)
POTASSIUM SERPL-SCNC: 3.9 MEQ/L (ref 3.4–4.9)
PROCALCITONIN: 0.06 NG/ML (ref 0–0.15)
PROTEIN UA: 30 MG/DL
RBC # BLD: 4.62 M/UL (ref 4.2–5.4)
RBC UA: NORMAL /HPF (ref 0–5)
REASON FOR REJECTION: NORMAL
REJECTED TEST: NORMAL
SODIUM BLD-SCNC: 136 MEQ/L (ref 135–144)
SPECIFIC GRAVITY UA: 1.03 (ref 1–1.03)
TOTAL PROTEIN: 6.9 G/DL (ref 6.3–8)
URINE CULTURE, ROUTINE: ABNORMAL
URINE CULTURE, ROUTINE: ABNORMAL
URINE REFLEX TO CULTURE: ABNORMAL
UROBILINOGEN, URINE: 0.2 E.U./DL
WBC # BLD: 7.1 K/UL (ref 4.8–10.8)
WBC UA: NORMAL /HPF (ref 0–5)

## 2022-09-20 PROCEDURE — 93005 ELECTROCARDIOGRAM TRACING: CPT | Performed by: PHYSICIAN ASSISTANT

## 2022-09-20 PROCEDURE — 6360000002 HC RX W HCPCS: Performed by: PHYSICIAN ASSISTANT

## 2022-09-20 PROCEDURE — 99284 EMERGENCY DEPT VISIT MOD MDM: CPT

## 2022-09-20 PROCEDURE — 81001 URINALYSIS AUTO W/SCOPE: CPT

## 2022-09-20 PROCEDURE — 36415 COLL VENOUS BLD VENIPUNCTURE: CPT

## 2022-09-20 PROCEDURE — 2580000003 HC RX 258: Performed by: PHYSICIAN ASSISTANT

## 2022-09-20 PROCEDURE — 84145 PROCALCITONIN (PCT): CPT

## 2022-09-20 PROCEDURE — 96360 HYDRATION IV INFUSION INIT: CPT

## 2022-09-20 PROCEDURE — 80053 COMPREHEN METABOLIC PANEL: CPT

## 2022-09-20 PROCEDURE — 85025 COMPLETE CBC W/AUTO DIFF WBC: CPT

## 2022-09-20 PROCEDURE — 96361 HYDRATE IV INFUSION ADD-ON: CPT

## 2022-09-20 PROCEDURE — 83605 ASSAY OF LACTIC ACID: CPT

## 2022-09-20 RX ORDER — 0.9 % SODIUM CHLORIDE 0.9 %
1000 INTRAVENOUS SOLUTION INTRAVENOUS ONCE
Status: COMPLETED | OUTPATIENT
Start: 2022-09-20 | End: 2022-09-20

## 2022-09-20 RX ORDER — ONDANSETRON 2 MG/ML
4 INJECTION INTRAMUSCULAR; INTRAVENOUS ONCE
Status: DISCONTINUED | OUTPATIENT
Start: 2022-09-20 | End: 2022-09-20 | Stop reason: HOSPADM

## 2022-09-20 RX ORDER — KETOROLAC TROMETHAMINE 15 MG/ML
15 INJECTION, SOLUTION INTRAMUSCULAR; INTRAVENOUS ONCE
Status: DISCONTINUED | OUTPATIENT
Start: 2022-09-20 | End: 2022-09-20 | Stop reason: HOSPADM

## 2022-09-20 RX ADMIN — SODIUM CHLORIDE 1000 ML: 9 INJECTION, SOLUTION INTRAVENOUS at 17:27

## 2022-09-20 RX ADMIN — SODIUM CHLORIDE 1000 ML: 9 INJECTION, SOLUTION INTRAVENOUS at 15:31

## 2022-09-20 ASSESSMENT — PAIN - FUNCTIONAL ASSESSMENT: PAIN_FUNCTIONAL_ASSESSMENT: NONE - DENIES PAIN

## 2022-09-20 ASSESSMENT — ENCOUNTER SYMPTOMS
NAUSEA: 1
VOMITING: 1
ABDOMINAL PAIN: 1
COUGH: 0

## 2022-09-20 NOTE — ED NOTES
Pt stable, 0 c/o, denies pain, pt a&ox2-3. Skin w/d/pink, 0 distress, will monitor.      Arabella Portillo RN  09/20/22 4639

## 2022-09-20 NOTE — ED PROVIDER NOTES
[address] [facility] ED  eMERGENCY dEPARTMENT eNCOUnter      Pt Name: Rock Miller  MRN: [id number]  [place] 1955  Date of evaluation: 9/20/2022  Provider: Deidra Frost PA-C    CHIEF COMPLAINT       Chief Complaint   Patient presents with    Urinary Tract Infection     Decreased responsiveness at nursing home         HISTORY OF PRESENT ILLNESS   (Location/Symptom, Timing/Onset,Context/Setting, Quality, Duration, Modifying Factors, Severity)  Note limiting factors. Rock Miller is a 79 y.o. female who presents to the emergency department concern for decreased responsiveness at nursing home she wakes to voice answers questions including her name she does note that she has some abdominal pain as well as feeling as if she is nauseous. Patient does not answer all questions. Symptoms mild to moderate severity nothing improves or worsens her symptoms. HPI    NursingNotes were reviewed. REVIEW OF SYSTEMS    (2-9 systems for level 4, 10 or more for level 5)     Review of Systems   Reason unable to perform ROS: Somnolent wakes to voice. Respiratory:  Negative for cough. Cardiovascular:  Negative for chest pain. Gastrointestinal:  Positive for abdominal pain, nausea and vomiting. Except as noted above the remainder of the review of systems was reviewed and negative.        PAST MEDICAL HISTORY     Past Medical History:   Diagnosis Date    Anxiety     Arthritis     Asthma     Breast cancer ([address]     Cancer ([address] 4/2015    Right breast cancer metastatic to axillary node    Chronic back pain     Chronic cholecystitis with calculus 2/1/2018    Depression     History of blood transfusion     History of therapeutic radiation     Hyperlipidemia     Hypertension     Multiple gallstones 10/3/2017    Prolonged emergence from general anesthesia     took 10-13 hours to wake up    SVT (supraventricular tachycardia) ([address]     Thyroid disease          SURGICALHISTORY       Past Surgical History: Procedure Laterality Date    BREAST BIOPSY Right 4/16/15    ULTRASOUND GUIDED BIOPSY RIGHT AXILLA, RIGHT AXILLARY DISSECTION,ULTRASOUND GUIDED RIGHT BREAST BIOPSY    BREAST LUMPECTOMY      CHOLECYSTECTOMY      COLONOSCOPY  9/12/14    polypectomy jarmoszuk    COLONOSCOPY N/A 11/19/2020    COLORECTAL CANCER SCREENING, HIGH RISK performed by Gordo Payne MD at [place] [address] [address],CHOLECYSTECTOMY/GRAPH N/A 4/13/2018    LAPAROSCOPIC CHOLECYSTECTOMY WITH GRAMS performed by Rayna Gold MD at [address] VAD W/SUBQ PORT/ CTR/PRPH INSJ N/A 2/21/2017    PORT REMOVAL performed by Karrie Delgado MD at . [facility]      tail bone    TONSILLECTOMY      TUBAL LIGATION      TUNNELED VENOUS PORT PLACEMENT Left 06/02/15    SUBCLAVIAN VEIN    WRIST GANGLION EXCISION Left          CURRENT MEDICATIONS       Previous Medications    ALBUTEROL SULFATE  (90 BASE) MCG/ACT INHALER    Inhale 2 puffs into the lungs every 6 hours as needed for Wheezing    LEVOTHYROXINE (SYNTHROID) 50 MCG TABLET    take 1 tablet by mouth once daily    LEVOTHYROXINE (SYNTHROID) 75 MCG TABLET    Take 1 tablet by mouth daily    LYMPHEDEMA PUMP    1 Device as needed (arm swelling)    PRAVASTATIN (PRAVACHOL) 20 MG TABLET    Take 1 tablet by mouth daily    SERTRALINE (ZOLOFT) 100 MG TABLET    Take 1 tablet by mouth daily    SULFAMETHOXAZOLE-TRIMETHOPRIM (BACTRIM DS) 800-160 MG PER TABLET    Take 1 tablet by mouth 2 times daily for 7 days    VERAPAMIL (CALAN SR) 180 MG EXTENDED RELEASE TABLET    Take 1 tablet by mouth nightly            Latex, Oxycodone, Pcn [penicillins], Tape [adhesive tape], and Tramadol    FAMILY HISTORY       Family History   Problem Relation Age of Onset    Heart Disease Mother     Colon Cancer Father     Colon Polyps Father     Other Father 58        Post Op    Other Brother         Accidental Shooting    Brain Cancer Child     Breast Cancer Maternal Grandmother           SOCIAL HISTORY Social History     Socioeconomic History    Marital status:      Spouse name: None    Number of children: None    Years of education: None    Highest education level: None   Tobacco Use    Smoking status: Never    Smokeless tobacco: Never   Vaping Use    Vaping Use: Never used   Substance and Sexual Activity    Alcohol use: No    Drug use: No    Sexual activity: Not Currently       SCREENINGS   Ebola Virus Disease (EVD) Screening   Temp: 97.8 °F (36.6 °C)  CIWA Assessment  BP: (!) 153/66  Heart Rate: 100    PHYSICAL EXAM    (up to 7 for level 4, 8 or more for level 5)     ED Triage Vitals [09/20/22 1436]   BP Temp Temp src Heart Rate Resp SpO2 Height Weight   (!) 156/82 97.8 °F (36.6 °C) -- (!) 110 28 96 % -- 155 lb (70.3 kg)       Physical Exam  Vitals and nursing note reviewed. Constitutional:       General: She is not in acute distress. Appearance: She is well-developed. She is not ill-appearing. Comments: Somnolent wakes to voice   HENT:      Head: Normocephalic and atraumatic. Right Ear: Tympanic membrane and external ear normal.      Left Ear: Tympanic membrane and external ear normal.      Nose: Nose normal.      Mouth/Throat:      Mouth: Mucous membranes are moist.   Eyes:      General:         Right eye: No discharge. Left eye: No discharge. Pupils: Pupils are equal, round, and reactive to light. Cardiovascular:      Rate and Rhythm: Normal rate and regular rhythm. Heart sounds: Normal heart sounds. Pulmonary:      Effort: Pulmonary effort is normal. No respiratory distress. Breath sounds: Normal breath sounds. No stridor. Abdominal:      General: Bowel sounds are normal. There is no distension. Palpations: Abdomen is soft. Tenderness: There is abdominal tenderness. Musculoskeletal:         General: Normal range of motion. Cervical back: Normal range of motion and neck supple. Skin:     General: Skin is warm.       Findings: No erythema. Neurological:      Comments: Somnolent wakes to voice knows her name does not answer additional questions       RESULTS     EKG: All EKG's are interpreted by the Emergency Department Physician who either signs or Co-signsthis chart in the absence of a cardiologist.         RADIOLOGY:   Sean Allis such as CT, Ultrasound and MRI are read by the radiologist. Plain radiographic images are visualized and preliminarily interpreted by the emergency physician with the below findings:         Interpretation per the Radiologist below, if available at the time ofthis note:    No orders to display         ED BEDSIDE ULTRASOUND:   Performed by ED Physician - none    LABS:  Labs Reviewed   URINALYSIS WITH REFLEX TO CULTURE - Abnormal; Notable for the following components:       Result Value    Color, UA DARK YELLOW (*)     Clarity, UA CLOUDY (*)     Ketones, Urine 15 (*)     Protein, UA 30 (*)     All other components within normal limits   CBC WITH AUTO DIFFERENTIAL - Abnormal; Notable for the following components:    Lymphocytes Absolute 0.9 (*)     Monocytes Absolute 0.9 (*)     All other components within normal limits   COMPREHENSIVE METABOLIC PANEL - Abnormal; Notable for the following components:    Glucose 118 (*)     Alkaline Phosphatase 281 (*)      (*)      (*)     All other components within normal limits   LACTIC ACID   MICROSCOPIC URINALYSIS   SPECIMEN REJECTION   PROCALCITONIN       All other labs were within normal range or not returned as of this dictation.     EMERGENCY DEPARTMENT COURSE and DIFFERENTIAL DIAGNOSIS/MDM:   Vitals:    Vitals:    09/20/22 1700 09/20/22 1715 09/20/22 1730 09/20/22 1815   BP: (!) 164/77 (!) 152/69 (!) 156/73 (!) 153/66   Pulse: (!) 110 (!) 101 (!) 108 100   Resp: 19 23 26 20   Temp:       SpO2: 97% 98% 97% 99%   Weight:                MDM  Number of Diagnoses or Management Options  Dehydration  Elevated LFTs  Hx: UTI (urinary tract infection)  Sinus tachycardia  Diagnosis management comments: CT brain yesterday laboratory work-up increased LFTs  She is improved patient's heart rate improved with fluids. Pt responsive to nursing. Evaluate patient with family at bedside patient now conversant feels better will disposition to home heart rate better running 96 post fluids continue antibiotics follow-up with primary care       Amount and/or Complexity of Data Reviewed  Clinical lab tests: ordered and reviewed        CRITICAL CARE TIME     CONSULTS:  None    PROCEDURES:  Unless otherwise noted below, none     Procedures    FINAL IMPRESSION      1. Dehydration    2. Sinus tachycardia    3. Hx: UTI (urinary tract infection)    4.  Elevated LFTs          DISPOSITION/PLAN   DISPOSITION Discharge - Pending Orders Complete 09/20/2022 07:31:26 PM      PATIENT REFERRED TO:  YOEL Nunn NP  [address] [facility]  [id number] [place] [address]  [phone]    Call in 1 day      [facility]) ED  [address] [place] [address]  [phone]  Go to   If symptoms worsen    DISCHARGE MEDICATIONS:  New Prescriptions    No medications on file          (Please note that portions of this note were completed with a voice recognition program.  Efforts were made to edit the dictations but occasionally words are mis-transcribed.)    Maria L Wick PA-C (electronically signed)  Attending Emergency Physician        Maria L Wick PA-C  09/20/22 1933

## 2022-09-20 NOTE — ED TRIAGE NOTES
Pt arrived via [facility] from [facility] with c/o decreased loc and htn. Per ems pt was awake but groggy. O/a pt awake but slow to respond. Skin pink w/d resp tachypec and non labored. Pt here yesterday for same. bs 186 per ems.  Pt dx uti yesterday

## 2022-09-20 NOTE — ED NOTES
Pt resting in bed, a&ox2-3, skin w/d/pink, pulses palp, 0 distress, 0 n&v. Will monitor.      Iveth Dias, RN  09/20/22 6941

## 2022-09-20 NOTE — DISCHARGE INSTRUCTIONS
Plenty of fluids follow-up with primary care physician. Return to if any symptoms worsen or new symptoms develop.

## 2022-09-20 NOTE — ED NOTES
Pt a&ox3, skin w/d/pink, pulses palp, speech clear, 0 c/o, 0 distress. Will monitor.      Juju Flores RN  09/20/22 0352

## 2022-09-21 LAB
EKG ATRIAL RATE: 106 BPM
EKG P AXIS: 58 DEGREES
EKG P-R INTERVAL: 128 MS
EKG Q-T INTERVAL: 342 MS
EKG QRS DURATION: 84 MS
EKG QTC CALCULATION (BAZETT): 454 MS
EKG R AXIS: 19 DEGREES
EKG T AXIS: 61 DEGREES
EKG VENTRICULAR RATE: 106 BPM

## 2022-09-27 ENCOUNTER — HOSPITAL ENCOUNTER (EMERGENCY)
Age: 67
Discharge: ANOTHER HOSPITAL | End: 2022-09-28
Payer: MEDICARE

## 2022-09-27 VITALS
SYSTOLIC BLOOD PRESSURE: 147 MMHG | BODY MASS INDEX: 24.8 KG/M2 | RESPIRATION RATE: 16 BRPM | HEART RATE: 95 BPM | HEIGHT: 63 IN | WEIGHT: 140 LBS | OXYGEN SATURATION: 95 % | DIASTOLIC BLOOD PRESSURE: 67 MMHG | TEMPERATURE: 98.4 F

## 2022-09-27 DIAGNOSIS — F03.91 DEMENTIA WITH BEHAVIORAL DISTURBANCE, UNSPECIFIED DEMENTIA TYPE: Primary | ICD-10-CM

## 2022-09-27 LAB
ACETAMINOPHEN LEVEL: <5 UG/ML (ref 10–30)
ALBUMIN SERPL-MCNC: 4 G/DL (ref 3.5–4.6)
ALP BLD-CCNC: 179 U/L (ref 40–130)
ALT SERPL-CCNC: 47 U/L (ref 0–33)
AMPHETAMINE SCREEN, URINE: NORMAL
ANION GAP SERPL CALCULATED.3IONS-SCNC: 15 MEQ/L (ref 9–15)
AST SERPL-CCNC: 26 U/L (ref 0–35)
BACTERIA: NEGATIVE /HPF
BARBITURATE SCREEN URINE: NORMAL
BASOPHILS ABSOLUTE: 0 K/UL (ref 0–0.2)
BASOPHILS RELATIVE PERCENT: 0.6 %
BENZODIAZEPINE SCREEN, URINE: NORMAL
BILIRUB SERPL-MCNC: 0.4 MG/DL (ref 0.2–0.7)
BILIRUBIN URINE: NEGATIVE
BLOOD, URINE: ABNORMAL
BUN BLDV-MCNC: 12 MG/DL (ref 8–23)
CALCIUM SERPL-MCNC: 9.8 MG/DL (ref 8.5–9.9)
CANNABINOID SCREEN URINE: NORMAL
CHLORIDE BLD-SCNC: 98 MEQ/L (ref 95–107)
CHOLESTEROL, TOTAL: 150 MG/DL (ref 0–199)
CLARITY: CLEAR
CO2: 24 MEQ/L (ref 20–31)
COCAINE METABOLITE SCREEN URINE: NORMAL
COLOR: YELLOW
CREAT SERPL-MCNC: 0.47 MG/DL (ref 0.5–0.9)
EOSINOPHILS ABSOLUTE: 0.1 K/UL (ref 0–0.7)
EOSINOPHILS RELATIVE PERCENT: 1.7 %
EPITHELIAL CELLS, UA: ABNORMAL /HPF (ref 0–5)
ETHANOL PERCENT: NORMAL G/DL
ETHANOL: <10 MG/DL (ref 0–0.08)
GFR AFRICAN AMERICAN: >60
GFR NON-AFRICAN AMERICAN: >60
GLOBULIN: 3.1 G/DL (ref 2.3–3.5)
GLUCOSE BLD-MCNC: 78 MG/DL (ref 70–99)
GLUCOSE URINE: NEGATIVE MG/DL
HCT VFR BLD CALC: 37 % (ref 37–47)
HDLC SERPL-MCNC: 51 MG/DL (ref 40–59)
HEMOGLOBIN: 12.3 G/DL (ref 12–16)
HYALINE CASTS: ABNORMAL /HPF (ref 0–5)
KETONES, URINE: 40 MG/DL
LDL CHOLESTEROL CALCULATED: 78 MG/DL (ref 0–129)
LEUKOCYTE ESTERASE, URINE: NEGATIVE
LYMPHOCYTES ABSOLUTE: 1 K/UL (ref 1–4.8)
LYMPHOCYTES RELATIVE PERCENT: 23.5 %
MCH RBC QN AUTO: 29.6 PG (ref 27–31.3)
MCHC RBC AUTO-ENTMCNC: 33.2 % (ref 33–37)
MCV RBC AUTO: 89.2 FL (ref 82–100)
METHADONE SCREEN, URINE: NORMAL
MONOCYTES ABSOLUTE: 0.7 K/UL (ref 0.2–0.8)
MONOCYTES RELATIVE PERCENT: 15.5 %
NEUTROPHILS ABSOLUTE: 2.5 K/UL (ref 1.4–6.5)
NEUTROPHILS RELATIVE PERCENT: 58.7 %
NITRITE, URINE: NEGATIVE
OPIATE SCREEN URINE: NORMAL
PDW BLD-RTO: 14 % (ref 11.5–14.5)
PH UA: 5.5 (ref 5–9)
PHENCYCLIDINE SCREEN URINE: NORMAL
PLATELET # BLD: 296 K/UL (ref 130–400)
POTASSIUM SERPL-SCNC: 3.8 MEQ/L (ref 3.4–4.9)
PROPOXYPHENE SCREEN, URINE: NORMAL
PROTEIN UA: NEGATIVE MG/DL
RBC # BLD: 4.15 M/UL (ref 4.2–5.4)
RBC UA: ABNORMAL /HPF (ref 0–5)
SALICYLATE, SERUM: <0.3 MG/DL (ref 15–30)
SARS-COV-2, NAAT: NOT DETECTED
SODIUM BLD-SCNC: 137 MEQ/L (ref 135–144)
SPECIFIC GRAVITY UA: 1.01 (ref 1–1.03)
TOTAL CK: 28 U/L (ref 0–170)
TOTAL PROTEIN: 7.1 G/DL (ref 6.3–8)
TRIGL SERPL-MCNC: 107 MG/DL (ref 0–150)
TSH SERPL DL<=0.05 MIU/L-ACNC: 3.17 UIU/ML (ref 0.44–3.86)
UR OXYCODONE RAPID SCREEN: NORMAL
URINE REFLEX TO CULTURE: ABNORMAL
UROBILINOGEN, URINE: 0.2 E.U./DL
WBC # BLD: 4.3 K/UL (ref 4.8–10.8)
WBC UA: ABNORMAL /HPF (ref 0–5)

## 2022-09-27 PROCEDURE — 84443 ASSAY THYROID STIM HORMONE: CPT

## 2022-09-27 PROCEDURE — 87635 SARS-COV-2 COVID-19 AMP PRB: CPT

## 2022-09-27 PROCEDURE — 99283 EMERGENCY DEPT VISIT LOW MDM: CPT

## 2022-09-27 PROCEDURE — 80143 DRUG ASSAY ACETAMINOPHEN: CPT

## 2022-09-27 PROCEDURE — 81001 URINALYSIS AUTO W/SCOPE: CPT

## 2022-09-27 PROCEDURE — 80307 DRUG TEST PRSMV CHEM ANLYZR: CPT

## 2022-09-27 PROCEDURE — 85025 COMPLETE CBC W/AUTO DIFF WBC: CPT

## 2022-09-27 PROCEDURE — 82550 ASSAY OF CK (CPK): CPT

## 2022-09-27 PROCEDURE — 80061 LIPID PANEL: CPT

## 2022-09-27 PROCEDURE — 80179 DRUG ASSAY SALICYLATE: CPT

## 2022-09-27 PROCEDURE — 82077 ASSAY SPEC XCP UR&BREATH IA: CPT

## 2022-09-27 PROCEDURE — 80306 DRUG TEST PRSMV INSTRMNT: CPT

## 2022-09-27 PROCEDURE — 80053 COMPREHEN METABOLIC PANEL: CPT

## 2022-09-27 RX ORDER — ACETAMINOPHEN 160 MG
1 TABLET,DISINTEGRATING ORAL DAILY
COMMUNITY

## 2022-09-27 RX ORDER — SPIRONOLACTONE 25 MG/1
25 TABLET ORAL DAILY
COMMUNITY

## 2022-09-27 RX ORDER — PANTOPRAZOLE SODIUM 20 MG/1
20 TABLET, DELAYED RELEASE ORAL DAILY
COMMUNITY

## 2022-09-27 RX ORDER — ERGOCALCIFEROL 1.25 MG/1
50000 CAPSULE ORAL WEEKLY
COMMUNITY

## 2022-09-27 RX ORDER — CYCLOBENZAPRINE HCL 5 MG
5 TABLET ORAL NIGHTLY PRN
COMMUNITY

## 2022-09-27 RX ORDER — ASPIRIN 81 MG/1
81 TABLET ORAL DAILY
COMMUNITY

## 2022-09-27 RX ORDER — ALLOPURINOL 100 MG/1
100 TABLET ORAL 3 TIMES DAILY
COMMUNITY

## 2022-09-27 RX ORDER — NAPROXEN 250 MG/1
250 TABLET ORAL 2 TIMES DAILY PRN
COMMUNITY

## 2022-09-27 ASSESSMENT — ENCOUNTER SYMPTOMS
EYE DISCHARGE: 0
SHORTNESS OF BREATH: 0
SORE THROAT: 0
ABDOMINAL PAIN: 0
CONSTIPATION: 0
ABDOMINAL DISTENTION: 0
RHINORRHEA: 0
COLOR CHANGE: 0

## 2022-09-27 ASSESSMENT — PATIENT HEALTH QUESTIONNAIRE - PHQ9: SUM OF ALL RESPONSES TO PHQ QUESTIONS 1-9: 19

## 2022-09-27 ASSESSMENT — PAIN SCALES - GENERAL: PAINLEVEL_OUTOF10: 0

## 2022-09-27 ASSESSMENT — PAIN - FUNCTIONAL ASSESSMENT: PAIN_FUNCTIONAL_ASSESSMENT: NONE - DENIES PAIN

## 2022-09-27 NOTE — ED NOTES
Oriented to the MAURISIO. Slow to respond. Oriented to person, place & time (knew month,day of week, the year, the president of the [place], but thought today was the 26th). Patient denies calling the Police threatening the Staff. States, \"I heard people talking about dying, that's why I called the Police\".      Parish Mcdaniel RN  09/27/22 7210

## 2022-09-27 NOTE — ED TRIAGE NOTES
1330: Patient arrived via EMS. Sent in from [facility] with a complaint that patient was calling local police saying she was going to kill all staff. They also report hallucinations. Upon arrival denies hallucinations, suicidal ideation and intent to harm staff. She does indicate that she is afraid that someone is trying to harm her but could not elaborate. Patient is poor historian. Patient changed into gown and made a 1:1 for safety. Room secure per policy and belongings placed in bag.  Electronically signed by Deborah Shin RN on 9/27/2022 at 1:54 PM

## 2022-09-27 NOTE — ED NOTES
Appetite good for dinner. Taking PO fluids well. Talking with Staff @ present.      Haven Ludwig RN  09/27/22 4664

## 2022-09-27 NOTE — ED NOTES
Patient assisted on bedpan & voided QS dark keesha urine. Urine specimen obtained & sent to lab. Steph care done.      Silva Espinosa RN  09/27/22 0146

## 2022-09-27 NOTE — ED NOTES
Provisional Diagnosis:       Depression, Unspecified  Dementia    Psychosocial and Contextual Factors:       Patient had been living alone in her own mobile home and was admitted to the hospital for foot pain due to gout and plantar fascitis. She was transferred to [facility] for rehab upon discharge. Patient was recently admitted to [facility] for altered mental status for suspected UTI and was discharged back to [facility]. She is  and has two living adult sons and her daughter was  in . Patient uses a walker to ambulate. C-SSRS Summary:  C-SSRS Summary:    Patient: C-SSRS Suicide Screening  1) Within the past month, have you wished you were dead or wished you could go to sleep and not wake up? : No  2) Have you actually had any thoughts of killing yourself? : No  3) Have you been thinking about how you might kill yourself? : No  4) Have you had these thoughts and had some intention of acting on them? : No  5) Have you started to work out or worked out the details of how to kill yourself? Do you intend to carry out this plan? : No  6) Have you ever done anything, started to do anything, or prepared to do anything to end your life?: Yes  Did this occur within the past 3 months? : No, patient reports an overdose on medication in the , denies any other suicide attempt or self harm  Risk of Suicide: Low Risk       Patient: Calm. Slow to respond and follow direction. Family: Son and sister present and supportive  Agency: Not currently receiving services      Substance Abuse: Denies    Present Suicidal Behavior:  Denies    Verbal: Denies    Attempt: Denies current attempt, admits to an overdose in the \"    Past Suicidal Behavior: Denies current SI          Self-Injurious/Self-Mutilation: None noted      Violence Current or Past: Denies any criminal charges, history of violence or incarcerations.     Trauma Identified:    Physical Abuse: Denies  Verbal Abuse: Denies  Emotional abuse: Denies  Financial Abuse: Denies  Sexual abuse: Denies      Protective Factors:      Supportive Family  Compliant with treatment    Risk Factors:      Cognitive Impairment  Altered Mobility      Clinical Summary:        Tacho Garvin is a 79 y.o. female who presents to the emergency department for a psychiatric evaluation for increase in symptoms of depression and paranoia while staying at [facility]. Patient called the police four times today reporting that staff was \"going to hurt someone\" and she was going to [employer] the staff. \" Patient denies making any comment threatening harm to anyone. States \"unless I'm crazy, I heard them talking and I was scared someone was going to be hurt. \" Patient verbalizes she kept hearing bells ringing and staff saying \"there's another one gone. \" She believed she heard staff say that her mother had passed and was surprised when she was able to phone her mother and reach her. Patient believes she heard staff walk by her room shouting \"ho, ho\" which terrified her. Patient states that she is also hearing voices stating that she is taking too many pills. Patient cooperative with assessment and oriented to person, place and time. Disoriented to situation. Thought process slow with thought blocking noted. Mood depressed with flat affect and good eye contact. Reports she has been medication compliant. Denies any sleep disturbance or change in appetite.          Level of Care Disposition:  Per Dr. Yuli Crockett, RN  09/27/22 [address] [place] [address], RN  09/27/22 [address] [place] [address], RN  09/27/22 3071

## 2022-09-27 NOTE — ED NOTES
1400: Patient transferred to [facility]. Bedside report given to Marcus Madrigal [place]. Belongings with patient.  Electronically signed by Grady Terrell RN on 9/27/2022 at 2:07 PM      Grady Terrell RN  09/27/22 7674

## 2022-09-27 NOTE — ED PROVIDER NOTES
[address] [facility] ED  eMERGENCY dEPARTMENT eNCOUnter      Pt Name: Gordy Bowling  MRN: [id number]  Armslibertygfhusam 1955  Date of evaluation: 9/27/2022  Provider: Ken Szymanski PA-C    CHIEF COMPLAINT       Chief Complaint   Patient presents with    Mental Health Problem     Hallucinations and threatening staff at [facility]. HISTORY OF PRESENT ILLNESS   (Location/Symptom, Timing/Onset,Context/Setting, Quality, Duration, Modifying Factors, Severity)  Note limiting factors. Gordy Bowling is a 79 y.o. female who presents to the emergency department complaint of hallucinations, hearing things, threatening to kill all the staff members at [facility]. Patient had just been recently seen and discharged from the [facility] for altered mental status, which was assumed to be secondary to urinary tract infection. Patient states that she is hearing voices stating that she is taking too many pills, she is very slow in her response, majority of time just staring at me and not responding back. She is alert and oriented to self and location, she does not specifically know why she is here today. She denies any chest pain shortness of breath nausea vomiting or dizziness. She denies any acute injury. Past medical history significant for hypertension, asthma, depression, SVT, thyroid disease, hyperlipidemia anxiety, chronic back pain. HPI    NursingNotes were reviewed. REVIEW OF SYSTEMS    (2-9 systems for level 4, 10 or more for level 5)     Review of Systems   Constitutional:  Negative for activity change and appetite change. HENT:  Negative for congestion, ear discharge, ear pain, nosebleeds, rhinorrhea and sore throat. Eyes:  Negative for discharge. Respiratory:  Negative for shortness of breath. Cardiovascular:  Negative for chest pain, palpitations and leg swelling. Gastrointestinal:  Negative for abdominal distention, abdominal pain and constipation. Genitourinary:  Negative for difficulty urinating and dysuria. Musculoskeletal:  Negative for arthralgias. Skin:  Negative for color change. Neurological:  Negative for dizziness, syncope, numbness and headaches. Psychiatric/Behavioral:  Positive for hallucinations. Negative for agitation and confusion. Hallucinating, hearing voices, patient slow to respond, per nursing home staff, patient threatening to kill staff members. Except as noted above the remainder of the review of systems was reviewed and negative.        PAST MEDICAL HISTORY     Past Medical History:   Diagnosis Date    Anxiety     Arthritis     Asthma     Breast cancer ([facility] [address]     Cancer ([facility] [address] 4/2015    Right breast cancer metastatic to axillary node    Chronic back pain     Chronic cholecystitis with calculus 2/1/2018    Depression     History of blood transfusion     History of therapeutic radiation     Hyperlipidemia     Hypertension     Multiple gallstones 10/3/2017    Prolonged emergence from general anesthesia     took 10-13 hours to wake up    SVT (supraventricular tachycardia) ([facility] [address]     Thyroid disease          SURGICALHISTORY       Past Surgical History:   Procedure Laterality Date    BREAST BIOPSY Right 4/16/15    ULTRASOUND GUIDED BIOPSY RIGHT AXILLA, RIGHT AXILLARY DISSECTION,ULTRASOUND GUIDED RIGHT BREAST BIOPSY    BREAST LUMPECTOMY      CHOLECYSTECTOMY      COLONOSCOPY  9/12/14    polypectomy jarmoszuk    COLONOSCOPY N/A 11/19/2020    COLORECTAL CANCER SCREENING, HIGH RISK performed by Julio Cesar Mejia MD at [facility] LAP,CHOLECYSTECTOMY/GRAPH N/A 4/13/2018    LAPAROSCOPIC CHOLECYSTECTOMY WITH GRAMS performed by Janis Ya MD at [facility] VAD W/SUBQ PORT/ CTR/PRPH INSJ N/A 2/21/2017    PORT REMOVAL performed by Vipin Ortega MD at [facility]      tail bone    TONSILLECTOMY      TUBAL LIGATION      TUNNELED VENOUS PORT PLACEMENT Left 06/02/15    SUBCLAVIAN VEIN    WRIST GANGLION EXCISION Left          CURRENT MEDICATIONS       Discharge Medication List as of 9/28/2022  1:16 AM        CONTINUE these medications which have NOT CHANGED    Details   allopurinol (ZYLOPRIM) 100 MG tablet Take 100 mg by mouth 3 times dailyHistorical Med      aspirin 81 MG EC tablet Take 81 mg by mouth dailyHistorical Med      cyclobenzaprine (FLEXERIL) 5 MG tablet Take 5 mg by mouth nightly as needed for Muscle spasmsHistorical Med      naproxen (NAPROSYN) 250 MG tablet Take 250 mg by mouth 2 times daily as needed for PainHistorical Med      pantoprazole (PROTONIX) 20 MG tablet Take 20 mg by mouth dailyHistorical Med      Cholecalciferol (VITAMIN D3) 50 MCG (2000 UT) CAPS Take 1 capsule by mouth dailyHistorical Med      vitamin D (ERGOCALCIFEROL) 1.25 MG (54791 UT) CAPS capsule Take 50,000 Units by mouth once a week Take every fridayHistorical Med      spironolactone (ALDACTONE) 25 MG tablet Take 25 mg by mouth dailyHistorical Med      levothyroxine (SYNTHROID) 75 MCG tablet Take 1 tablet by mouth daily, Disp-90 tablet,R-3Normal      albuterol sulfate  (90 Base) MCG/ACT inhaler Inhale 2 puffs into the lungs every 6 hours as needed for Wheezing, Disp-18 g,R-3Normal      sertraline (ZOLOFT) 100 MG tablet Take 1 tablet by mouth daily, Disp-90 tablet,R-3Normal      verapamil (CALAN SR) 180 MG extended release tablet Take 1 tablet by mouth nightly, Disp-90 tablet,R-3Normal             ALLERGIES     Latex, Oxycodone, Pcn [penicillins], Tape [adhesive tape], and Tramadol    FAMILY HISTORY       Family History   Problem Relation Age of Onset    Heart Disease Mother     Colon Cancer Father     Colon Polyps Father     Other Father 58        Post Op    Other Brother         Accidental Shooting    Brain Cancer Child     Breast Cancer Maternal Grandmother           SOCIAL HISTORY       Social History     Socioeconomic History    Marital status:    Tobacco Use    Smoking status: Never    Smokeless tobacco: Never   Vaping Use    Vaping Use: Never used   Substance and Sexual Activity    Alcohol use: No    Drug use: No    Sexual activity: Not Currently       SCREENINGS    Dion Coma Scale  Eye Opening: Spontaneous  Best Verbal Response: Oriented  Best Motor Response: Obeys commands  [place] Coma Scale Score: 15 @FLOW([id number])@      PHYSICAL EXAM    (up to 7 for level 4, 8 or more for level 5)     ED Triage Vitals [09/27/22 1329]   BP Temp Temp Source Pulse Resp SpO2 Height Weight   (!) 145/76 97.9 °F (36.6 °C) Oral -- 16 99 % 5' 3\" (1.6 m) 140 lb (63.5 kg)       Physical Exam  Vitals and nursing note reviewed. Constitutional:       General: She is not in acute distress. Appearance: She is well-developed. She is not ill-appearing, toxic-appearing or diaphoretic. HENT:      Head: Normocephalic. Right Ear: Tympanic membrane normal.      Left Ear: Tympanic membrane normal.      Nose: No congestion. Mouth/Throat:      Mouth: Mucous membranes are moist.      Pharynx: No oropharyngeal exudate or posterior oropharyngeal erythema. Eyes:      Extraocular Movements: Extraocular movements intact. Conjunctiva/sclera: Conjunctivae normal.      Pupils: Pupils are equal, round, and reactive to light. Neck:      Vascular: No JVD. Trachea: No tracheal deviation. Cardiovascular:      Rate and Rhythm: Normal rate. Pulses: Normal pulses. Heart sounds: Normal heart sounds. No murmur heard. No friction rub. No gallop. Pulmonary:      Effort: Pulmonary effort is normal. No tachypnea, accessory muscle usage, respiratory distress or retractions. Breath sounds: No stridor. No wheezing, rhonchi or rales. Chest:      Chest wall: No tenderness. Abdominal:      General: Abdomen is flat. Bowel sounds are normal. There is no distension or abdominal bruit. Palpations: There is no shifting dullness, fluid wave, hepatomegaly, splenomegaly, mass or pulsatile mass.       Tenderness: There is no abdominal tenderness. There is no right CVA tenderness, left CVA tenderness, guarding or rebound. Negative signs include Moreno's sign, Rovsing's sign and McBurney's sign. Musculoskeletal:         General: No deformity. Cervical back: Normal range of motion and neck supple. No rigidity. Skin:     General: Skin is warm and dry. Capillary Refill: Capillary refill takes less than 2 seconds. Coloration: Skin is not jaundiced. Neurological:      General: No focal deficit present. Mental Status: She is alert. Mental status is at baseline. Cranial Nerves: No cranial nerve deficit. Sensory: No sensory deficit. Motor: No weakness. Coordination: Coordination normal.      Comments: Patient alert and oriented to person and place, she does not understand her situation is why she is here in the [facility] apartment, she states she has been hearing voices, which are telling her that she is taking too many pills. She moving all extremities well, she is able to stand and ambulate with upright steady gait, she has slow speech, no slurring, no weakness, no drift upper lower extremities.    Psychiatric:         Mood and Affect: Mood normal.       DIAGNOSTIC RESULTS     EKG: All EKG's are interpreted by the Emergency Department Physician who either signs or Co-signsthis chart in the absence of a cardiologist.        RADIOLOGY:   Buddie Graft such as CT, Ultrasound and MRI are read by the radiologist. Plain radiographic images are visualized and preliminarily interpreted by the emergency physician with the below findings:        Interpretation per the Radiologist below, if available at the time ofthis note:    No orders to display         ED BEDSIDE ULTRASOUND:   Performed by ED Physician - none    LABS:  Labs Reviewed   ACETAMINOPHEN LEVEL - Abnormal; Notable for the following components:       Result Value    Acetaminophen Level <5 (*)     All other components within normal limits   CBC WITH AUTO DIFFERENTIAL - Abnormal; Notable for the following components:    WBC 4.3 (*)     RBC 4.15 (*)     All other components within normal limits   COMPREHENSIVE METABOLIC PANEL - Abnormal; Notable for the following components:    Creatinine 0.47 (*)     Alkaline Phosphatase 179 (*)     ALT 47 (*)     All other components within normal limits   SALICYLATE LEVEL - Abnormal; Notable for the following components:    Salicylate, Serum <0.7 (*)     All other components within normal limits   URINALYSIS WITH REFLEX TO CULTURE - Abnormal; Notable for the following components:    Ketones, Urine 40 (*)     Blood, Urine MODERATE (*)     All other components within normal limits   MICROSCOPIC URINALYSIS - Abnormal; Notable for the following components:    RBC, UA 3-5 (*)     All other components within normal limits   COVID-19, RAPID   CK   ETHANOL   LIPID PANEL   TSH   URINE DRUG SCREEN   URINE DRUG SCREEN, COMPREHENSIVE       All other labs were within normal range or not returned as of this dictation. EMERGENCY DEPARTMENT COURSE and DIFFERENTIAL DIAGNOSIS/MDM:   Vitals:    Vitals:    09/27/22 1329 09/27/22 1759   BP: (!) 145/76 (!) 147/67   Pulse:  95   Resp: 16 16   Temp: 97.9 °F (36.6 °C) 98.4 °F (36.9 °C)   TempSrc: Oral Oral   SpO2: 99% 95%   Weight: 140 lb (63.5 kg)    Height: 5' 3\" (1.6 m)             MDM  Number of Diagnoses or Management Options  Dementia with behavioral disturbance, unspecified dementia type  Diagnosis management comments: Patient seen in the emergency department for complaint of hallucinations, threatening to harm the staff at [facility]. Patient had recent mission at the [facility] [facility] [employer] [facility] clinic for altered mental status, which was assumed to be secondary to urinary tract infection at that time. Patient does have dementia, and after medical clearance, and evaluation, is felt patient's symptoms are secondary to dementia with behavioral disturbances.   Patient was discharged, and advised to contact her family provider for further care and evaluation. CRITICAL CARE TIME   Total Critical Care time was 0 minutes, excluding separately reportableprocedures. There was a high probability of clinicallysignificant/life threatening deterioration in the patient's condition which required my urgent intervention. CONSULTS:  None    PROCEDURES:  Unless otherwise noted below, none     Procedures    FINAL IMPRESSION      1. Dementia with behavioral disturbance, unspecified dementia type [facility])          DISPOSITION/PLAN   DISPOSITION Decision To Discharge 09/28/2022 01:15:57 AM      PATIENT REFERRED TO:  No follow-up provider specified.     DISCHARGE MEDICATIONS:  Discharge Medication List as of 9/28/2022  1:16 AM             (Please note that portions of this note were completed with a voice recognition program.  Efforts were made to edit the dictations but occasionally words are mis-transcribed.)    Bianka Birmingham PA-C (electronically signed)  Attending Emergency Physician         Bianka Birmingham PA-C  10/01/22 Sumaya Pimentel PA-C  10/01/22 Sumaya Pimentel PA-C  10/01/22 6208

## 2022-09-27 NOTE — ED NOTES
[facility]  Paula Barros PA-C @ bedside for medical exam.     Duyen Palacio RN  09/27/22 5722

## 2022-09-28 LAB
AMPHETAMINE SCREEN, URINE: NORMAL
BARBITURATE SCREEN URINE: NORMAL
BENZODIAZEPINE SCREEN, URINE: NORMAL
CANNABINOID SCREEN URINE: NORMAL
COCAINE METABOLITE SCREEN URINE: NORMAL
FENTANYL SCREEN, URINE: NORMAL
Lab: NORMAL
METHADONE SCREEN, URINE: NORMAL
OPIATE SCREEN URINE: NORMAL
OXYCODONE URINE: NORMAL
PHENCYCLIDINE SCREEN URINE: NORMAL
PROPOXYPHENE SCREEN: NORMAL

## 2022-09-28 NOTE — ED NOTES
[facility] faxed to [facility]    Patient appears to be  sleeping respirations are even and unlabored no distress noted at this time.        Yolis Soler RN  09/27/22 9486

## 2022-09-28 NOTE — ED NOTES
Patient resting quietly respirations are even and unlabored no distress noted at this time.      Mare Vela RN  09/28/22 7559

## 2022-09-28 NOTE — ED NOTES
Pt awake in bed. Patient has become more confused and repetitive. Stating the TV is not a tv, calling this RN a liar, until it was turned on. Patient requests to [facility] for a walk. \" when reminded she was in the ED and asked where she was heading on the walk pt replied \"I just want to walk out there and around. \" again reminded that she was in the ED and that she is not able to walk out. Patient agreeable to walk in room, then relax and watch TV.      Tab Arellano RN  09/27/22 0662

## 2022-09-28 NOTE — ED NOTES
Pt assisted with 2 to bedside commode, needed some prompting. Urinated with no issues. 1x assist back to bed.       Teena Ramos RN  09/27/22 2007       Teena Ramos RN  09/27/22 2008

## 2022-09-28 NOTE — ED NOTES
Report called to Humaira at       [facility]11[facility]    [facility] 30-45mins.       Teena Ramos RN  09/28/22 5575

## 2022-09-28 NOTE — ED NOTES
Leon at [facility] called received clinical packet need pink slip.       All Haskins RN  09/27/22 2121       All Haskins RN  09/27/22 2130

## 2022-09-28 NOTE — ED NOTES
Leon from  called    Accepting: Dr Chung Dates: [id number]       Kirstin Szymanski, RN  09/27/22 4702

## 2022-10-03 ENCOUNTER — APPOINTMENT (OUTPATIENT)
Dept: GENERAL RADIOLOGY | Age: 67
End: 2022-10-03
Payer: MEDICARE

## 2022-10-03 ENCOUNTER — APPOINTMENT (OUTPATIENT)
Dept: CT IMAGING | Age: 67
End: 2022-10-03
Payer: MEDICARE

## 2022-10-03 ENCOUNTER — HOSPITAL ENCOUNTER (EMERGENCY)
Age: 67
Discharge: HOME | End: 2022-10-03
Payer: MEDICARE

## 2022-10-03 VITALS
DIASTOLIC BLOOD PRESSURE: 76 MMHG | RESPIRATION RATE: 17 BRPM | HEIGHT: 63 IN | TEMPERATURE: 98.3 F | SYSTOLIC BLOOD PRESSURE: 128 MMHG | WEIGHT: 140 LBS | HEART RATE: 68 BPM | BODY MASS INDEX: 24.8 KG/M2 | OXYGEN SATURATION: 100 %

## 2022-10-03 DIAGNOSIS — S09.90XA INJURY OF HEAD, INITIAL ENCOUNTER: Primary | ICD-10-CM

## 2022-10-03 DIAGNOSIS — R45.851 SUICIDAL IDEATIONS: ICD-10-CM

## 2022-10-03 LAB
ACETAMINOPHEN LEVEL: <5 UG/ML (ref 10–30)
ALBUMIN SERPL-MCNC: 3.6 G/DL (ref 3.5–4.6)
ALP BLD-CCNC: 142 U/L (ref 40–130)
ALT SERPL-CCNC: 53 U/L (ref 0–33)
AMPHETAMINE SCREEN, URINE: NORMAL
ANION GAP SERPL CALCULATED.3IONS-SCNC: 14 MEQ/L (ref 9–15)
AST SERPL-CCNC: 39 U/L (ref 0–35)
BACTERIA: ABNORMAL /HPF
BARBITURATE SCREEN URINE: NORMAL
BASOPHILS ABSOLUTE: 0 K/UL (ref 0–0.2)
BASOPHILS RELATIVE PERCENT: 0.8 %
BENZODIAZEPINE SCREEN, URINE: NORMAL
BILIRUB SERPL-MCNC: 0.3 MG/DL (ref 0.2–0.7)
BILIRUBIN URINE: NEGATIVE
BLOOD, URINE: NEGATIVE
BUN BLDV-MCNC: 15 MG/DL (ref 8–23)
CALCIUM SERPL-MCNC: 9.8 MG/DL (ref 8.5–9.9)
CANNABINOID SCREEN URINE: NORMAL
CHLORIDE BLD-SCNC: 100 MEQ/L (ref 95–107)
CLARITY: CLEAR
CO2: 25 MEQ/L (ref 20–31)
COCAINE METABOLITE SCREEN URINE: NORMAL
COLOR: YELLOW
CREAT SERPL-MCNC: 0.71 MG/DL (ref 0.5–0.9)
EOSINOPHILS ABSOLUTE: 0 K/UL (ref 0–0.7)
EOSINOPHILS RELATIVE PERCENT: 0.7 %
EPITHELIAL CELLS, UA: ABNORMAL /HPF (ref 0–5)
ETHANOL PERCENT: NORMAL G/DL
ETHANOL: <10 MG/DL (ref 0–0.08)
FENTANYL SCREEN, URINE: NORMAL
GFR AFRICAN AMERICAN: >60
GFR NON-AFRICAN AMERICAN: >60
GLOBULIN: 2.9 G/DL (ref 2.3–3.5)
GLUCOSE BLD-MCNC: 112 MG/DL (ref 70–99)
GLUCOSE URINE: NEGATIVE MG/DL
HCT VFR BLD CALC: 38.8 % (ref 37–47)
HEMOGLOBIN: 13 G/DL (ref 12–16)
HYALINE CASTS: ABNORMAL /HPF (ref 0–5)
HYALINE CASTS: ABNORMAL /LPF (ref 0–5)
KETONES, URINE: ABNORMAL MG/DL
LEUKOCYTE ESTERASE, URINE: ABNORMAL
LYMPHOCYTES ABSOLUTE: 1.6 K/UL (ref 1–4.8)
LYMPHOCYTES RELATIVE PERCENT: 27.9 %
Lab: NORMAL
MCH RBC QN AUTO: 29.7 PG (ref 27–31.3)
MCHC RBC AUTO-ENTMCNC: 33.4 % (ref 33–37)
MCV RBC AUTO: 88.9 FL (ref 82–100)
METHADONE SCREEN, URINE: NORMAL
MONOCYTES ABSOLUTE: 1.1 K/UL (ref 0.2–0.8)
MONOCYTES RELATIVE PERCENT: 20.1 %
MUCUS: PRESENT /LPF
NEUTROPHILS ABSOLUTE: 2.8 K/UL (ref 1.4–6.5)
NEUTROPHILS RELATIVE PERCENT: 50.5 %
NITRITE, URINE: NEGATIVE
OPIATE SCREEN URINE: NORMAL
OXYCODONE URINE: NORMAL
PDW BLD-RTO: 14.6 % (ref 11.5–14.5)
PH UA: 6 (ref 5–9)
PHENCYCLIDINE SCREEN URINE: NORMAL
PLATELET # BLD: 267 K/UL (ref 130–400)
POTASSIUM SERPL-SCNC: 3.7 MEQ/L (ref 3.4–4.9)
PROPOXYPHENE SCREEN: NORMAL
PROTEIN UA: ABNORMAL MG/DL
RBC # BLD: 4.37 M/UL (ref 4.2–5.4)
RBC UA: ABNORMAL /HPF (ref 0–5)
SALICYLATE, SERUM: <0.3 MG/DL (ref 15–30)
SARS-COV-2, NAAT: NOT DETECTED
SODIUM BLD-SCNC: 139 MEQ/L (ref 135–144)
SPECIFIC GRAVITY UA: 1.02 (ref 1–1.03)
TOTAL CK: 15 U/L (ref 0–170)
TOTAL PROTEIN: 6.5 G/DL (ref 6.3–8)
TSH SERPL DL<=0.05 MIU/L-ACNC: 0.97 UIU/ML (ref 0.44–3.86)
URINE REFLEX TO CULTURE: YES
UROBILINOGEN, URINE: 1 E.U./DL
WBC # BLD: 5.6 K/UL (ref 4.8–10.8)
WBC UA: ABNORMAL /HPF (ref 0–5)

## 2022-10-03 PROCEDURE — 36415 COLL VENOUS BLD VENIPUNCTURE: CPT

## 2022-10-03 PROCEDURE — 80179 DRUG ASSAY SALICYLATE: CPT

## 2022-10-03 PROCEDURE — 87635 SARS-COV-2 COVID-19 AMP PRB: CPT

## 2022-10-03 PROCEDURE — 6830039000 HC L3 TRAUMA ALERT

## 2022-10-03 PROCEDURE — 81003 URINALYSIS AUTO W/O SCOPE: CPT

## 2022-10-03 PROCEDURE — 99285 EMERGENCY DEPT VISIT HI MDM: CPT

## 2022-10-03 PROCEDURE — 82077 ASSAY SPEC XCP UR&BREATH IA: CPT

## 2022-10-03 PROCEDURE — 93005 ELECTROCARDIOGRAM TRACING: CPT | Performed by: PHYSICIAN ASSISTANT

## 2022-10-03 PROCEDURE — 87086 URINE CULTURE/COLONY COUNT: CPT

## 2022-10-03 PROCEDURE — 70450 CT HEAD/BRAIN W/O DYE: CPT

## 2022-10-03 PROCEDURE — 71101 X-RAY EXAM UNILAT RIBS/CHEST: CPT

## 2022-10-03 PROCEDURE — 80307 DRUG TEST PRSMV CHEM ANLYZR: CPT

## 2022-10-03 PROCEDURE — 80143 DRUG ASSAY ACETAMINOPHEN: CPT

## 2022-10-03 PROCEDURE — 72128 CT CHEST SPINE W/O DYE: CPT

## 2022-10-03 PROCEDURE — 85025 COMPLETE CBC W/AUTO DIFF WBC: CPT

## 2022-10-03 PROCEDURE — 82550 ASSAY OF CK (CPK): CPT

## 2022-10-03 PROCEDURE — 84443 ASSAY THYROID STIM HORMONE: CPT

## 2022-10-03 PROCEDURE — 80053 COMPREHEN METABOLIC PANEL: CPT

## 2022-10-03 ASSESSMENT — PAIN SCALES - GENERAL: PAINLEVEL_OUTOF10: 5

## 2022-10-03 ASSESSMENT — PATIENT HEALTH QUESTIONNAIRE - PHQ9: SUM OF ALL RESPONSES TO PHQ QUESTIONS 1-9: 27

## 2022-10-03 ASSESSMENT — ENCOUNTER SYMPTOMS
NAUSEA: 0
SORE THROAT: 0
COUGH: 0
VOMITING: 0
RHINORRHEA: 0
SHORTNESS OF BREATH: 0
ABDOMINAL PAIN: 0
DIARRHEA: 0
PHOTOPHOBIA: 0
EYE PAIN: 0
BACK PAIN: 1

## 2022-10-03 ASSESSMENT — PAIN - FUNCTIONAL ASSESSMENT: PAIN_FUNCTIONAL_ASSESSMENT: NONE - DENIES PAIN

## 2022-10-03 ASSESSMENT — PAIN DESCRIPTION - ORIENTATION
ORIENTATION: RIGHT;POSTERIOR
ORIENTATION_2: RIGHT;ANTERIOR

## 2022-10-03 ASSESSMENT — PAIN DESCRIPTION - LOCATION
LOCATION_2: HEAD
LOCATION: SHOULDER

## 2022-10-03 ASSESSMENT — PAIN DESCRIPTION - INTENSITY: RATING_2: 5

## 2022-10-03 ASSESSMENT — PAIN DESCRIPTION - DESCRIPTORS
DESCRIPTORS: ACHING;DULL
DESCRIPTORS_2: ACHING

## 2022-10-03 ASSESSMENT — PAIN DESCRIPTION - PAIN TYPE: TYPE: ACUTE PAIN

## 2022-10-03 ASSESSMENT — LIFESTYLE VARIABLES: HOW OFTEN DO YOU HAVE A DRINK CONTAINING ALCOHOL: NEVER

## 2022-10-03 NOTE — ED NOTES
Pt a&ox4, resting in bed, pt denies si/hi at this time, pt states \"I was bad person and I feel bad about it\", pt tearful. Pt follows commands, 0 pain, 0 distress, 0 sob, pt used bed pan, urine simple to lab. Pt henna. Well. blanket to pt, henna. Well. Pt turns self in bed. Speech clear. Calm, cooperative.      Moose Martinez RN  10/03/22 0081

## 2022-10-03 NOTE — ED NOTES
Pt moved to [facility] bed 1 to be monitored closely by tech. Pt stable, calm at this time, a&ox4, skin w/d/pink, 0 distress. Waiting for transport to [address] [place] [address].      Angela Weiss RN  10/03/22 Uziel Burks RN  10/03/22 4939

## 2022-10-03 NOTE — ED NOTES
Pt found with plastic straw rubbing against her left wrist, pt states \"I don't want to be here\". Plastic straw removed, pt cooperative, pt's left wrist reddened. Marla jim notified, 0 new orders.         Miguelito Sears RN  10/03/22 5794

## 2022-10-03 NOTE — ED TRIAGE NOTES
A & Ox4. Skin pink warm and dry. Arrives from [facility] via [facility]. Pt states she was supposed to use the walker d/t her plantar fascitis, but thought she would show them she didn't need one, attempted to walk and fell, hitting right side of head on the floor. Denies LOC. States she hit her right scapula on \"something\". Pain with palpation to right scapula but no redness or ecchymosis noted. No obvious injury noted to right side of forehead where patient points to pain area. Upon questioning her for triage, pt admits she has been suicidal recently. States she doesn't have a plan because \"I hear them talking and they are going to kill me!\" When asked who, states \"The people in the [facility] say they are going to kill me soon. \" Pt appears very anxious. States she doesn't want to be here but they forced her to come. Head to toe exam otherwise negative. Abdomen soft and nontender. Pelvis stable.

## 2022-10-03 NOTE — ED PROVIDER NOTES
[address] [facility] ED  eMERGENCY dEPARTMENTeNCOUnter      Pt Name: Israel Draper  MRN: [id number]  [place] 1955  Date ofevaluation: 10/3/2022  Provider: Maryjane Sanders PA-C    CHIEF COMPLAINT       Chief Complaint   Patient presents with    Fall     Unwitnessed fall. Found on her right side a few minutes after she fell         HISTORY OF PRESENT ILLNESS   (Location/Symptom, Timing/Onset,Context/Setting, Quality, Duration, Modifying Factors, Severity)  Note limiting factors. Israel Draper is a 79 y.o. female who presents to the emergency department as fall at [facility] [place]. Patient endorses suicidal ideation not wanting to live anymore but wanted someone else to kill her. Patient states that she is busy using her walker and she was not and caused her to fall. She said she did hit her head but did not lose consciousness. She does take 81 mg aspirin. Patient is a poor historian. She has some pain to her mid back. Denies numbness tingling weakness. HPI    NursingNotes were reviewed. REVIEW OF SYSTEMS    (2-9 systems for level 4, 10 or more for level 5)     Review of Systems   Constitutional:  Negative for chills, diaphoresis, fatigue and fever. HENT:  Negative for congestion, rhinorrhea and sore throat. Eyes:  Negative for photophobia and pain. Respiratory:  Negative for cough and shortness of breath. Cardiovascular:  Negative for chest pain and palpitations. Gastrointestinal:  Negative for abdominal pain, diarrhea, nausea and vomiting. Genitourinary:  Negative for dysuria and flank pain. Musculoskeletal:  Positive for back pain. Skin:  Negative for rash. Neurological:  Negative for dizziness, light-headedness and headaches. Psychiatric/Behavioral: Negative. All other systems reviewed and are negative. Except as noted above the remainder of the review of systems was reviewed and negative.        PAST MEDICAL HISTORY     Past Medical History:   Diagnosis Date    Anxiety Arthritis     Asthma     Breast cancer ([facility]     Cancer ([facility] 4/2015    Right breast cancer metastatic to axillary node    Chronic back pain     Chronic cholecystitis with calculus 2/1/2018    Depression     History of blood transfusion     History of therapeutic radiation     Hyperlipidemia     Hypertension     Multiple gallstones 10/3/2017    Prolonged emergence from general anesthesia     took 10-13 hours to wake up    SVT (supraventricular tachycardia) ([facility]     Thyroid disease          SURGICALHISTORY       Past Surgical History:   Procedure Laterality Date    BREAST BIOPSY Right 4/16/15    ULTRASOUND GUIDED BIOPSY RIGHT AXILLA, RIGHT AXILLARY DISSECTION,ULTRASOUND GUIDED RIGHT BREAST BIOPSY    BREAST LUMPECTOMY      CHOLECYSTECTOMY      COLONOSCOPY  9/12/14    polypectomy jarmoszuk    COLONOSCOPY N/A 11/19/2020    COLORECTAL CANCER SCREENING, HIGH RISK performed by Brenna Funk MD at [place] [address] [address],CHOLECYSTECTOMY/GRAPH N/A 4/13/2018    LAPAROSCOPIC CHOLECYSTECTOMY WITH GRAMS performed by Clover Szymanski MD at [address] [place] [address] [facility] CTR VAD W/SUBQ PORT/ CTR/PRPH INSJ N/A 2/21/2017    PORT REMOVAL performed by Tabitha Blanton MD at [address]      tail bone    TONSILLECTOMY      TUBAL LIGATION      TUNNELED VENOUS PORT PLACEMENT Left 06/02/15    SUBCLAVIAN VEIN    WRIST GANGLION EXCISION Left          CURRENT MEDICATIONS       Previous Medications    ALBUTEROL SULFATE  (90 BASE) MCG/ACT INHALER    Inhale 2 puffs into the lungs every 6 hours as needed for Wheezing    ALLOPURINOL (ZYLOPRIM) 100 MG TABLET    Take 100 mg by mouth 3 times daily    ASPIRIN 81 MG EC TABLET    Take 81 mg by mouth daily    CHOLECALCIFEROL (VITAMIN D3) 50 MCG (2000 UT) CAPS    Take 1 capsule by mouth daily    CYCLOBENZAPRINE (FLEXERIL) 5 MG TABLET    Take 5 mg by mouth nightly as needed for Muscle spasms    LEVOTHYROXINE (SYNTHROID) 75 MCG TABLET    Take 1 tablet by mouth daily    NAPROXEN (NAPROSYN) 250 MG TABLET    Take 250 mg by mouth 2 times daily as needed for Pain    PANTOPRAZOLE (PROTONIX) 20 MG TABLET    Take 20 mg by mouth daily    SERTRALINE (ZOLOFT) 100 MG TABLET    Take 1 tablet by mouth daily    SPIRONOLACTONE (ALDACTONE) 25 MG TABLET    Take 25 mg by mouth daily    VERAPAMIL (CALAN SR) 180 MG EXTENDED RELEASE TABLET    Take 1 tablet by mouth nightly    VITAMIN D (ERGOCALCIFEROL) 1.25 MG (97502 UT) CAPS CAPSULE    Take 50,000 Units by mouth once a week Take every friday       ALLERGIES     Latex, Oxycodone, Pcn [penicillins], Tape [adhesive tape], and Tramadol    FAMILY HISTORY       Family History   Problem Relation Age of Onset    Heart Disease Mother     Colon Cancer Father     Colon Polyps Father     Other Father 58        Post Op    Other Brother         Accidental Shooting    Brain Cancer Child     Breast Cancer Maternal Grandmother           SOCIAL HISTORY       Social History     Socioeconomic History    Marital status:      Spouse name: None    Number of children: None    Years of education: None    Highest education level: None   Tobacco Use    Smoking status: Former     Types: Cigarettes    Smokeless tobacco: Never   Vaping Use    Vaping Use: Never used   Substance and Sexual Activity    Alcohol use: Yes     Comment: Last drink was (Aug 2020)    Drug use: No    Sexual activity: Not Currently       SCREENINGS    [place] Coma Scale  Eye Opening: Spontaneous  Best Verbal Response: Oriented  Best Motor Response: Obeys commands  [place] Coma Scale Score: 15 @FLOW(05842855)@      PHYSICAL EXAM    (up to 7 for level 4, 8 or more for level 5)     ED Triage Vitals   BP Temp Temp Source Heart Rate Resp SpO2 Height Weight   10/03/22 1350 10/03/22 1352 10/03/22 1352 10/03/22 1350 10/03/22 1352 10/03/22 1350 10/03/22 1352 10/03/22 1352   120/80 98.3 °F (36.8 °C) Oral 90 (!) 34 100 % 5' 3\" (1.6 m) 140 lb (63.5 kg)       Physical Exam  Vitals and nursing note reviewed. Constitutional:       General: She is not in acute distress. Appearance: Normal appearance. She is well-developed. She is not diaphoretic. HENT:      Head: Normocephalic and atraumatic. Nose: Nose normal.      Mouth/Throat:      Mouth: Mucous membranes are moist.   Eyes:      General: Lids are normal.      Conjunctiva/sclera: Conjunctivae normal.   Cardiovascular:      Rate and Rhythm: Normal rate and regular rhythm. Pulses: Normal pulses. Heart sounds: Normal heart sounds. Pulmonary:      Effort: Pulmonary effort is normal.      Breath sounds: Normal breath sounds. Abdominal:      General: Bowel sounds are normal.      Palpations: Abdomen is soft. Tenderness: There is no abdominal tenderness. Musculoskeletal:         General: Normal range of motion. Cervical back: Normal range of motion and neck supple. Thoracic back: Tenderness present. Back:       Comments: B/l equal 5/5 push and pulls of upper and lower extremities. Passive rom intact with no pain to ankles, knees, hips, wrists, elbows, shoulders, neck. Lymphadenopathy:      Cervical: No cervical adenopathy. Skin:     General: Skin is warm and dry. Capillary Refill: Capillary refill takes less than 2 seconds. Findings: No rash. Neurological:      Mental Status: She is alert and oriented to person, place, and time. Psychiatric:         Mood and Affect: Mood is depressed. Behavior: Behavior is withdrawn. Thought Content: Thought content includes suicidal ideation.          Judgment: Judgment normal.       DIAGNOSTIC RESULTS     EKG: All EKG's are interpreted by the Emergency Department Physician who either signs or Co-signsthis chart in the absence of a cardiologist.    Nsr 73bpm no acute st changes no ectopy     RADIOLOGY:   Non-plain filmimages such as CT, Ultrasound and MRI are read by the radiologist. Plain radiographic images are visualized and preliminarily interpreted by the emergency physician with the below findings:        Interpretation per the Radiologist below, if available at the time ofthis note:    XR RIBS RIGHT INCLUDE CHEST (MIN 3 VIEWS)   Final Result   No evidence of rib fracture is seen. CT HEAD WO CONTRAST   Final Result   No acute intracranial abnormality. CT THORACIC SPINE WO CONTRAST   Final Result   Unremarkable CT of the thoracic spine. Soft tissue density visualized in the anteromedial right upper lobe that   could represent a focal consolidation, would recommend follow-up evaluation. RECOMMENDATIONS:   Follow-up CT scan of the chest to evaluate for right upper lobe soft tissue   density. ED BEDSIDE ULTRASOUND:   Performed by ED Physician - none    LABS:  Labs Reviewed   COMPREHENSIVE METABOLIC PANEL - Abnormal; Notable for the following components:       Result Value    Glucose 112 (*)     Alkaline Phosphatase 142 (*)     ALT 53 (*)     AST 39 (*)     All other components within normal limits   CBC WITH AUTO DIFFERENTIAL - Abnormal; Notable for the following components:    RDW 14.6 (*)     Monocytes Absolute 1.1 (*)     All other components within normal limits   SALICYLATE LEVEL - Abnormal; Notable for the following components:    Salicylate, Serum <2.3 (*)     All other components within normal limits   ACETAMINOPHEN LEVEL - Abnormal; Notable for the following components:    Acetaminophen Level <5 (*)     All other components within normal limits   COVID-19, RAPID   ETHANOL   CK   TSH   URINALYSIS WITH REFLEX TO CULTURE   URINE DRUG SCREEN       All other labs were within normal range or not returned as of this dictation.     EMERGENCY DEPARTMENT COURSE and DIFFERENTIAL DIAGNOSIS/MDM:   Vitals:    Vitals:    10/03/22 1350 10/03/22 1352 10/03/22 1400 10/03/22 1430   BP: 120/80 120/68 120/63 133/64   Pulse: 90 76 80 77   Resp:  (!) 34 27 20   Temp:  98.3 °F (36.8 °C)     TempSrc:  Oral     SpO2: 100% 100% 100% 100% Weight:  140 lb (63.5 kg)     Height:  5' 3\" (1.6 m)             MDM    Patient presents to the emergency department with unwitnessed fall from [facility] [place]. Patient is suicidal but she is at [facility] [place] getting help. She was sent in due to unwitnessed fall. She had positive head injury but denies loss of consciousness. CT head is negative. She has mild tenderness to palpation in thoracic region CT is negative. Patient be [facility]. REASSESSMENT          CRITICAL CARE TIME   Total Critical Care time was  minutes, excluding separatelyreportable procedures. There was a high probability ofclinically significant/life threatening deterioration in the patient's condition which required my urgent intervention. CONSULTS:  None    PROCEDURES:  Unless otherwise noted below, none     Procedures    FINAL IMPRESSION      1. Injury of head, initial encounter    2. Suicidal ideations          DISPOSITION/PLAN   DISPOSITION Decision To Discharge 10/03/2022 03:51:42 PM      PATIENT REFERREDTO:  No follow-up provider specified.     DISCHARGEMEDICATIONS:  New Prescriptions    No medications on file          (Please note that portions of this note were completed with a voice recognition program.  Efforts were made to edit the dictations but occasionally words are mis-transcribed.)    Esteban Freeman PA-C (electronically signed)  Attending Emergency Physician         Esteban Freeman PA-C  10/03/22 [id number]

## 2022-10-03 NOTE — ED NOTES
Report to [facility] nurse. Pt out form ed via [facility], 0 c/o, 0 distress, 0 n&v, calm, cooperative.      Tory Mancuso RN  10/03/22 4441

## 2022-10-04 LAB
EKG ATRIAL RATE: 73 BPM
EKG P AXIS: 41 DEGREES
EKG P-R INTERVAL: 134 MS
EKG Q-T INTERVAL: 398 MS
EKG QRS DURATION: 72 MS
EKG QTC CALCULATION (BAZETT): 438 MS
EKG R AXIS: -8 DEGREES
EKG T AXIS: 12 DEGREES
EKG VENTRICULAR RATE: 73 BPM

## 2022-10-04 PROCEDURE — 93010 ELECTROCARDIOGRAM REPORT: CPT | Performed by: INTERNAL MEDICINE

## 2022-10-05 LAB — URINE CULTURE, ROUTINE: NORMAL

## 2022-12-23 ENCOUNTER — TELEPHONE (OUTPATIENT)
Dept: FAMILY MEDICINE CLINIC | Age: 67
End: 2022-12-23

## 2022-12-23 ENCOUNTER — NURSE TRIAGE (OUTPATIENT)
Dept: OTHER | Facility: CLINIC | Age: 67
End: 2022-12-23

## 2022-12-23 NOTE — TELEPHONE ENCOUNTER
Josselin Luis and got her scheduled with Dr. Meghann Silva for a physical. Last office visit was 11/2020.

## 2022-12-23 NOTE — TELEPHONE ENCOUNTER
----- Message from Mer Mckinney sent at 12/23/2022  9:27 AM EST -----  Subject: Message to Provider    QUESTIONS  Information for Provider? please call to schedule a NP appt / screen green     ---------------------------------------------------------------------------  --------------  Fransisco White INFO  [phone]; OK to leave message on voicemail  ---------------------------------------------------------------------------  --------------  SCRIPT ANSWERS  Relationship to Patient?  Self

## 2022-12-23 NOTE — TELEPHONE ENCOUNTER
Location of patient: [address] call from Susana Ricardo at [facility]; Patient with Red Flag Complaint requesting to establish care with [facility] primary care office. Subjective: Caller states \"right foot swelling black and blue\"     Current Symptoms: she states she was in the hospital in September for edema in both feet and was prescribed a water pill. Left foot has improved but right foot is still causing her trouble. across the top of the foot is swollen and is worsened by standing or walking which causes a sharp pain. swelling gets worse throughout the day. She says that the top of foot looks bruised and has been this way for \"a while\". She says she has had xrays that have shown no broken bones. has been in physical therapy for foot pain. swelling sometimes goes up to shin. Onset: a few months ago; unchanged    Associated Symptoms: reduced activity    Pain Severity: 6/10; aching; constant, waxing and waning    Temperature: n/a     What has been tried: water pill    LMP: NA Pregnant: NA    Recommended disposition: See in Office Today    Care advice provided, patient verbalizes understanding; denies any other questions or concerns; instructed to call back for any new or worsening symptoms. Patient/Caller agrees with recommended disposition; writer provided warm transfer to [facility] [employer] for appointment scheduling    Attention Provider: Thank you for allowing me to participate in the care of your patient. The patient was connected to triage in response to information provided to the [facility]. Please do not respond through this encounter as the response is not directed to a shared pool. Reason for Disposition   SEVERE pain (e.g., excruciating, unable to do any normal activities)    Protocols used:  Foot Pain-ADULT-[place]

## 2023-01-23 ENCOUNTER — OFFICE VISIT (OUTPATIENT)
Dept: FAMILY MEDICINE CLINIC | Age: 68
End: 2023-01-23
Payer: MEDICARE

## 2023-01-23 VITALS
DIASTOLIC BLOOD PRESSURE: 66 MMHG | OXYGEN SATURATION: 98 % | WEIGHT: 146 LBS | BODY MASS INDEX: 24.92 KG/M2 | RESPIRATION RATE: 16 BRPM | TEMPERATURE: 97.4 F | SYSTOLIC BLOOD PRESSURE: 118 MMHG | HEART RATE: 60 BPM | HEIGHT: 64 IN

## 2023-01-23 VITALS
OXYGEN SATURATION: 98 % | HEART RATE: 60 BPM | DIASTOLIC BLOOD PRESSURE: 66 MMHG | SYSTOLIC BLOOD PRESSURE: 118 MMHG | BODY MASS INDEX: 24.92 KG/M2 | RESPIRATION RATE: 16 BRPM | HEIGHT: 64 IN | TEMPERATURE: 97.4 F | WEIGHT: 146 LBS

## 2023-01-23 DIAGNOSIS — M20.42 HAMMER TOE OF LEFT FOOT: Primary | ICD-10-CM

## 2023-01-23 DIAGNOSIS — Z00.00 MEDICARE ANNUAL WELLNESS VISIT, SUBSEQUENT: Primary | ICD-10-CM

## 2023-01-23 DIAGNOSIS — M79.671 FOOT PAIN, RIGHT: ICD-10-CM

## 2023-01-23 PROCEDURE — G8417 CALC BMI ABV UP PARAM F/U: HCPCS | Performed by: FAMILY MEDICINE

## 2023-01-23 PROCEDURE — 1090F PRES/ABSN URINE INCON ASSESS: CPT | Performed by: FAMILY MEDICINE

## 2023-01-23 PROCEDURE — 3078F DIAST BP <80 MM HG: CPT | Performed by: FAMILY MEDICINE

## 2023-01-23 PROCEDURE — G8427 DOCREV CUR MEDS BY ELIG CLIN: HCPCS | Performed by: FAMILY MEDICINE

## 2023-01-23 PROCEDURE — 3017F COLORECTAL CA SCREEN DOC REV: CPT | Performed by: FAMILY MEDICINE

## 2023-01-23 PROCEDURE — 1123F ACP DISCUSS/DSCN MKR DOCD: CPT | Performed by: FAMILY MEDICINE

## 2023-01-23 PROCEDURE — 99213 OFFICE O/P EST LOW 20 MIN: CPT | Performed by: FAMILY MEDICINE

## 2023-01-23 PROCEDURE — G8400 PT W/DXA NO RESULTS DOC: HCPCS | Performed by: FAMILY MEDICINE

## 2023-01-23 PROCEDURE — 3074F SYST BP LT 130 MM HG: CPT | Performed by: FAMILY MEDICINE

## 2023-01-23 PROCEDURE — 1036F TOBACCO NON-USER: CPT | Performed by: FAMILY MEDICINE

## 2023-01-23 PROCEDURE — G8484 FLU IMMUNIZE NO ADMIN: HCPCS | Performed by: FAMILY MEDICINE

## 2023-01-23 PROCEDURE — G0439 PPPS, SUBSEQ VISIT: HCPCS | Performed by: FAMILY MEDICINE

## 2023-01-23 RX ORDER — SPIRONOLACTONE 25 MG/1
25 TABLET ORAL DAILY
Qty: 30 TABLET | Refills: 5 | Status: SHIPPED | OUTPATIENT
Start: 2023-01-23

## 2023-01-23 RX ORDER — SERTRALINE HYDROCHLORIDE 100 MG/1
100 TABLET, FILM COATED ORAL DAILY
Qty: 90 TABLET | Refills: 3 | Status: SHIPPED | OUTPATIENT
Start: 2023-01-23

## 2023-01-23 SDOH — ECONOMIC STABILITY: FOOD INSECURITY: WITHIN THE PAST 12 MONTHS, THE FOOD YOU BOUGHT JUST DIDN'T LAST AND YOU DIDN'T HAVE MONEY TO GET MORE.: NEVER TRUE

## 2023-01-23 SDOH — ECONOMIC STABILITY: FOOD INSECURITY: WITHIN THE PAST 12 MONTHS, YOU WORRIED THAT YOUR FOOD WOULD RUN OUT BEFORE YOU GOT MONEY TO BUY MORE.: NEVER TRUE

## 2023-01-23 ASSESSMENT — PATIENT HEALTH QUESTIONNAIRE - PHQ9
1. LITTLE INTEREST OR PLEASURE IN DOING THINGS: 1
2. FEELING DOWN, DEPRESSED OR HOPELESS: 0
7. TROUBLE CONCENTRATING ON THINGS, SUCH AS READING THE NEWSPAPER OR WATCHING TELEVISION: 0
2. FEELING DOWN, DEPRESSED OR HOPELESS: 1
4. FEELING TIRED OR HAVING LITTLE ENERGY: 0
8. MOVING OR SPEAKING SO SLOWLY THAT OTHER PEOPLE COULD HAVE NOTICED. OR THE OPPOSITE, BEING SO FIGETY OR RESTLESS THAT YOU HAVE BEEN MOVING AROUND A LOT MORE THAN USUAL: 0
8. MOVING OR SPEAKING SO SLOWLY THAT OTHER PEOPLE COULD HAVE NOTICED. OR THE OPPOSITE, BEING SO FIGETY OR RESTLESS THAT YOU HAVE BEEN MOVING AROUND A LOT MORE THAN USUAL: 0
SUM OF ALL RESPONSES TO PHQ QUESTIONS 1-9: 2
9. THOUGHTS THAT YOU WOULD BE BETTER OFF DEAD, OR OF HURTING YOURSELF: 0
SUM OF ALL RESPONSES TO PHQ QUESTIONS 1-9: 2
10. IF YOU CHECKED OFF ANY PROBLEMS, HOW DIFFICULT HAVE THESE PROBLEMS MADE IT FOR YOU TO DO YOUR WORK, TAKE CARE OF THINGS AT HOME, OR GET ALONG WITH OTHER PEOPLE: 0
6. FEELING BAD ABOUT YOURSELF - OR THAT YOU ARE A FAILURE OR HAVE LET YOURSELF OR YOUR FAMILY DOWN: 0
SUM OF ALL RESPONSES TO PHQ QUESTIONS 1-9: 0
SUM OF ALL RESPONSES TO PHQ9 QUESTIONS 1 & 2: 0
SUM OF ALL RESPONSES TO PHQ9 QUESTIONS 1 & 2: 2
3. TROUBLE FALLING OR STAYING ASLEEP: 0
6. FEELING BAD ABOUT YOURSELF - OR THAT YOU ARE A FAILURE OR HAVE LET YOURSELF OR YOUR FAMILY DOWN: 0
4. FEELING TIRED OR HAVING LITTLE ENERGY: 0
5. POOR APPETITE OR OVEREATING: 0
SUM OF ALL RESPONSES TO PHQ QUESTIONS 1-9: 0
SUM OF ALL RESPONSES TO PHQ QUESTIONS 1-9: 0
9. THOUGHTS THAT YOU WOULD BE BETTER OFF DEAD, OR OF HURTING YOURSELF: 0
7. TROUBLE CONCENTRATING ON THINGS, SUCH AS READING THE NEWSPAPER OR WATCHING TELEVISION: 0
1. LITTLE INTEREST OR PLEASURE IN DOING THINGS: 0
SUM OF ALL RESPONSES TO PHQ QUESTIONS 1-9: 2
SUM OF ALL RESPONSES TO PHQ QUESTIONS 1-9: 2
SUM OF ALL RESPONSES TO PHQ QUESTIONS 1-9: 0
5. POOR APPETITE OR OVEREATING: 0
3. TROUBLE FALLING OR STAYING ASLEEP: 0

## 2023-01-23 ASSESSMENT — ENCOUNTER SYMPTOMS
EYES NEGATIVE: 1
GASTROINTESTINAL NEGATIVE: 1
RESPIRATORY NEGATIVE: 1
COUGH: 0
RHINORRHEA: 0
CHEST TIGHTNESS: 0

## 2023-01-23 ASSESSMENT — SOCIAL DETERMINANTS OF HEALTH (SDOH): HOW HARD IS IT FOR YOU TO PAY FOR THE VERY BASICS LIKE FOOD, HOUSING, MEDICAL CARE, AND HEATING?: NOT HARD AT ALL

## 2023-01-23 NOTE — PATIENT INSTRUCTIONS
Personalized Preventive Plan for Sara Lima - 1/23/2023  Medicare offers a range of preventive health benefits. Some of the tests and screenings are paid in full while other may be subject to a deductible, co-insurance, and/or copay. Some of these benefits include a comprehensive review of your medical history including lifestyle, illnesses that may run in your family, and various assessments and screenings as appropriate. After reviewing your medical record and screening and assessments performed today your provider may have ordered immunizations, labs, imaging, and/or referrals for you. A list of these orders (if applicable) as well as your Preventive Care list are included within your After Visit Summary for your review. Other Preventive Recommendations:    A preventive eye exam performed by an eye specialist is recommended every 1-2 years to screen for glaucoma; cataracts, macular degeneration, and other eye disorders. A preventive dental visit is recommended every 6 months. Try to get at least 150 minutes of exercise per week or 10,000 steps per day on a pedometer . Order or download the FREE \"Exercise & Physical Activity: Your Everyday Guide\" from [employer]. Call 8-397.970.1381 or search [employer] online. You need 5893-3449 mg of calcium and 0742-8060 IU of vitamin D per day. It is possible to meet your calcium requirement with diet alone, but a vitamin D supplement is usually necessary to meet this goal.  When exposed to the sun, use a sunscreen that protects against both UVA and UVB radiation with an SPF of 30 or greater. Reapply every 2 to 3 hours or after sweating, drying off with a towel, or swimming. Always wear a seat belt when traveling in a car. Always wear a helmet when riding a bicycle or motorcycle. Heart-Healthy Diet   Sodium, Fat, and Cholesterol Controlled Diet       What Is a Heart Healthy Diet?    A heart-healthy diet is one that limits sodium , certain types of fat , and cholesterol . This type of diet is recommended for:   People with any form of cardiovascular disease (eg, coronary heart disease , peripheral vascular disease , previous heart attack , previous stroke )   People with risk factors for cardiovascular disease, such as high blood pressure , high cholesterol , or diabetes   Anyone who wants to lower their risk of developing cardiovascular disease   Sodium    Sodium is a mineral found in many foods. In general, most people consume much more sodium than they need. Diets high in sodium can increase blood pressure and lead to edema (water retention). On a heart-healthy diet, you should consume no more than 2,300 mg (milligrams) of sodium per dayabout the amount in one teaspoon of table salt. The foods highest in sodium include table salt (about 50% sodium), processed foods, convenience foods, and preserved foods. Cholesterol    Cholesterol is a fat-like, waxy substance in your blood. Our bodies make some cholesterol. It is also found in animal products, with the highest amounts in fatty meat, egg yolks, whole milk, cheese, shellfish, and organ meats. On a heart-healthy diet, you should limit your cholesterol intake to less than 200 mg per day. It is normal and important to have some cholesterol in your bloodstream. But too much cholesterol can cause plaque to build up within your arteries, which can eventually lead to a heart attack or stroke. The two types of cholesterol that are most commonly referred to are:   Low-density lipoprotein (LDL) cholesterol  Also known as bad cholesterol, this is the cholesterol that tends to build up along your arteries. Bad cholesterol levels are increased by eating fats that are saturated or hydrogenated. Optimal level of this cholesterol is less than 100. Over 130 starts to get risky for heart disease.    High-density lipoprotein (HDL) cholesterol  Also known as good cholesterol, this type of cholesterol actually carries cholesterol away from your arteries and may, therefore, help lower your risk of having a heart attack. You want this level to be high (ideally greater than 60). It is a risk to have a level less than 40. You can raise this good cholesterol by eating olive oil, canola oil, avocados, or nuts. Exercise raises this level, too. Fat    Fat is calorie dense and packs a lot of calories into a small amount of food. Even though fats should be limited due to their high calorie content, not all fats are bad. In fact, some fats are quite healthful. Fat can be broken down into four main types. The good-for-you fats are:   Monounsaturated fat  found in oils such as olive and canola, avocados, and nuts and natural nut butters; can decrease cholesterol levels, while keeping levels of HDL cholesterol high   Polyunsaturated fat  found in oils such as safflower, sunflower, soybean, corn, and sesame; can decrease total cholesterol and LDL cholesterol   Omega-3 fatty acids  particularly those found in fatty fish (such as salmon, trout, tuna, mackerel, herring, and sardines); can decrease risk of arrhythmias, decrease triglyceride levels, and slightly lower blood pressure   The fats that you want to limit are:   Saturated fat  found in animal products, many fast foods, and a few vegetables; increases total blood cholesterol, including LDL levels   Animal fats that are saturated include: butter, lard, whole-milk dairy products, meat fat, and poultry skin   Vegetable fats that are saturated include: hydrogenated shortening, palm oil, coconut oil, cocoa butter   Hydrogenated or trans fat  found in margarine and vegetable shortening, most shelf stable snack foods, and fried foods; increases LDL and decreases HDL     It is generally recommended that you limit your total fat for the day to less than 30% of your total calories.  If you follow an 1800-calorie heart healthy diet, for example, this would mean 60 grams of fat or less per day. Saturated fat and trans fat in your diet raises your blood cholesterol the most, much more than dietary cholesterol does. For this reason, on a heart-healthy diet, less than 7% of your calories should come from saturated fat and ideally 0% from trans fat. On an 1800-calorie diet, this translates into less than 14 grams of saturated fat per day, leaving 46 grams of fat to come from mono- and polyunsaturated fats.    Food Choices on a Heart Healthy Diet   Food Category   Foods Recommended   Foods to Avoid   Grains   Breads and rolls without salted tops Most dry and cooked cereals Unsalted crackers and breadsticks Low-sodium or homemade breadcrumbs or stuffing All rice and pastas   Breads, rolls, and crackers with salted tops High-fat baked goods (eg, muffins, donuts, pastries) Quick breads, self-rising flour, and biscuit mixes Regular bread crumbs Instant hot cereals Commercially prepared rice, pasta, or stuffing mixes   Vegetables   Most fresh, frozen, and low-sodium canned vegetables Low-sodium and salt-free vegetable juices Canned vegetables if unsalted or rinsed   Regular canned vegetables and juices, including sauerkraut and pickled vegetables Frozen vegetables with sauces Commercially prepared potato and vegetable mixes   Fruits   Most fresh, frozen, and canned fruits All fruit juices   Fruits processed with salt or sodium   Milk   Nonfat or low-fat (1%) milk Nonfat or low-fat yogurt Cottage cheese, low-fat ricotta, cheeses labeled as low-fat and low-sodium   Whole milk Reduced-fat (2%) milk Malted and chocolate milk Full fat yogurt Most cheeses (unless low-fat and low salt) Buttermilk (no more than 1 cup per week)   Meats and Beans   Lean cuts of fresh or frozen beef, veal, lamb, or pork (look for the word loin) Fresh or frozen poultry without the skin Fresh or frozen fish and some shellfish Egg whites and egg substitutes (Limit whole eggs to three per week) Tofu Nuts or seeds (unsalted, dry-roasted), low-sodium peanut butter Dried peas, beans, and lentils   Any smoked, cured, salted, or canned meat, fish, or poultry (including fry, chipped beef, cold cuts, hot dogs, sausages, sardines, and anchovies) Poultry skins Breaded and/or fried fish or meats Canned peas, beans, and lentils Salted nuts   Fats and Oils   Olive oil and canola oil Low-sodium, low-fat salad dressings and mayonnaise   Butter, margarine, coconut and palm oils, fry fat   Snacks, Sweets, and Condiments   Low-sodium or unsalted versions of broths, soups, soy sauce, and condiments Pepper, herbs, and spices; vinegar, lemon, or lime juice Low-fat frozen desserts (yogurt, sherbet, fruit bars) Sugar, cocoa powder, honey, syrup, jam, and preserves Low-fat, trans-fat free cookies, cakes, and pies Willi and animal crackers, fig bars, laurence snaps   High-fat desserts Broth, soups, gravies, and sauces, made from instant mixes or other high-sodium ingredients Salted snack foods Canned olives Meat tenderizers, seasoning salt, and most flavored vinegars   Beverages   Low-sodium carbonated beverages Tea and coffee in moderation Soy milk   Commercially softened water   Suggestions   Make whole grains, fruits, and vegetables the base of your diet. Choose heart-healthy fats such as canola, olive, and flaxseed oil, and foods high in heart-healthy fats, such as nuts, seeds, soybeans, tofu, and fish. Eat fish at least twice per week; the fish highest in omega-3 fatty acids and lowest in mercury include salmon, herring, mackerel, sardines, and canned chunk light tuna. If you eat fish less than twice per week or have high triglycerides, talk to your doctor about taking fish oil supplements. Read food labels. For products low in fat and cholesterol, look for fat free, low-fat, cholesterol free, saturated fat free, and trans fat freeAlso scan the Nutrition Facts Label, which lists saturated fat, trans fat, and cholesterol amounts. For products low in sodium, look for sodium free, very low sodium, low sodium, no added salt, and unsalted   Skip the salt when cooking or at the table; if food needs more flavor, get creative and try out different herbs and spices. Garlic and onion also add substantial flavor to foods. Trim any visible fat off meat and poultry before cooking, and drain the fat off after dubois. Use cooking methods that require little or no added fat, such as grilling, boiling, baking, poaching, broiling, roasting, steaming, stir-frying, and sauting. Avoid fast food and convenience food. They tend to be high in saturated and trans fat and have a lot of added salt. Talk to a registered dietitian for individualized diet advice. Last Reviewed: March 2011 Mikie Robledo MS, MPH, RD   Updated: 3/29/2011     Keep Your Memory Vandana Ends       Many factors can affect your ability to remembera hectic lifestyle, aging, stress, chronic disease, and certain medicines. But, there are steps you can take to sharpen your mind and help preserve your memory. Challenge Your Brain   Regularly challenging your mind may help keeps it in top shape. Good mental exercises include:   Crossword puzzlesUse a dictionary if you need it; you will learn more that way. Brainteasers Try some! Crafts, such as wood working and sewing   Hobbies, such as gardening and building model airplanes   SocializingVisit old friends or join groups to meet new ones. Reading   Learning a new language   Taking a class, whether it be art history or kamron chi   TravelingExperience the food, history, and culture of your destination   Learning to use a computer   Going to museums, the theater, or thought-provoking movies   Changing things in your daily life, such as reversing your pattern in the grocery store or brushing your teeth using your nondominant hand   Use Memory Aids   There is no need to remember every detail on your own.  These memory aids can help: Calendars and day planners   Electronic organizers to store all sorts of helpful informationThese devices can \"beep\" to remind you of appointments. A book of days to record birthdays, anniversaries, and other occasions that occur on the same date every year   Detailed \"to-do\" lists and strategically placed sticky notes   Quick \"study\" sessionsBefore a gathering, review who will be there so their names will be fresh in your mind. Establish routinesFor example, keep your keys, wallet, and umbrella in the same place all the time or take medicine with your 8:00 AM glass of juice   Live a Healthy Life   Many actions that will keep your body strong will do the same for your mind. For example:   Talk to Your Doctor About Herbs and Supplements    Malnutrition and vitamin deficiencies can impair your mental function. For example, vitamin B12 deficiency can cause a range of symptoms, including confusion. But, what if your nutritional needs are being met? Can herbs and supplements still offer a benefit? Researchers have investigated a range of natural remedies, such as ginkgo , ginseng , and the supplement phosphatidylserine (PS). So far, though, the evidence is inconsistent as to whether these products can improve memory or thinking. If you are interested in taking herbs and supplements, talk to your doctor first because they may interact with other medicines that you are taking. Exercise Regularly    Among the many benefits of regular exercise are increased blood flow to the brain and decreased risk of certain diseases that can interfere with memory function. One study found that even moderate exercise has a beneficial effect. Examples of \"moderate\" exercise include:   Playing 18 holes of golf once a week, without a cart   Playing tennis twice a week   Walking one mile per day   Manage Stress    It can be tough to remember what is important when your mind is cluttered. Make time for relaxation.  Choose activities that calm you down, and make it routine. Manage Chronic Conditions    Side effects of high blood pressure , diabetes, and heart disease can interfere with mental function. Many of the lifestyle steps discussed here can help manage these conditions. Strive to eat a healthy diet, exercise regularly, get stress under control, and follow your doctor's advice for your condition. Minimize Medications    Talk to your doctor about the medicines that you take. Some may be unnecessary. Also, healthy lifestyle habits may lower the need for certain drugs. Last Reviewed: April 2010 Teresa Luu MD   Updated: 4/13/2010     Keeping Home a [facility]       As we get older, changes in balance, gait, strength, vision, hearing, and cognition make even the most youthful senior more prone to accidents. Falls are one of the leading health risks for older people. This increased risk of falling is related to:   Aging process (eg, decreased muscle strength, slowed reflexes)   Higher incidence of chronic health problems (eg, arthritis, diabetes) that may limit mobility, agility or sensory awareness   Side effects of medicine (eg, dizziness, blurred vision)especially medicines like prescription pain medicines and drugs used to treat mental health conditions   Depending on the brittleness of your bones, the consequences of a fall can be serious and long lasting. Home Life   Research by the Association of A[employer] [facility]) shows that some home accidents among older adults can be prevented by making simple lifestyle changes and basic modifications and repairs to the home environment. Here are some lifestyle changes that experts recommend:   Have your hearing and vision checked regularly. Be sure to wear prescription glasses that are right for you. Speak to your doctor or pharmacist about the possible side effects of your medicines. A number of medicines can cause dizziness. If you have problems with sleep, talk to your doctor.    Limit your intake of alcohol. If necessary, use a cane or walker to help maintain your balance. Wear supportive, rubber-soled shoes, even at home. If you live in a region that gets wintry weather, you may want to put special cleats on your shoes to prevent you from slipping on the snow and ice. Exercise regularly to help maintain muscle tone, agility, and balance. Always hold the banister when going up or down stairs. Also, use  bars when getting in or out of the bath or shower, or using the toilet. To avoid dizziness, get up slowly from a lying down position. Sit up first, dangling your legs for a minute or two before rising to a standing position. Overall Home Safety Check   According to the [employer]'s \"Older Consumer Home Safety Checklist,\" it is important to check for potential hazards in each room. And remember, proper lighting is an essential factor in home safety. If you cannot see clearly, you are more likely to fall. Important questions to ask yourself include:   Are lamp, electric, extension, and telephone cords placed out of the flow of traffic and maintained in good condition? Have frayed cords been replaced? Are all small rugs and runners slip resistant? If not, you can secure them to the floor with a special double-sided carpet tape. Are smoke detectors properly locatedone on every floor of your home and one outside of every sleeping area? Are they in good working order? Are batteries replaced at least once a year? Do you have a well-maintained carbon monoxide detector outside every sleeping are in your home? Does your furniture layout leave plenty of space to maneuver between and around chairs, tables, beds, and sofas? Are hallways, stairs and passages between rooms well lit? Can you reach a lamp without getting out of bed? Are floor surfaces well maintained?  Shag rugs, high-pile carpeting, tile floors, and polished wood floors can be particularly slippery. Stairs should always have handrails and be carpeted or fitted with a non-skid tread. Is your telephone easily reachable. Is the cord safely tucked away? Room by Room   According to the Association [employer], bathrooms and miguel are the two most potentially hazardous rooms in your home. In the Kitchen    Be sure your stove is in proper working order and always make sure burners and the oven are off before you go out or go to sleep. Keep pots on the back burners, turn handles away from the front of the stove, and keep stove clean and free of grease build-up. Kitchen ventilation systems and range exhausts should be working properly. Keep flammable objects such as towels and pot holders away from the cooking area except when in use. Make sure kitchen curtains are tied back. Move cords and appliances away from the sink and hot surfaces. If extension cords are needed, install wiring guides so they do not hang over the sink, range, or working areas. Look for coffee pots, kettles and toaster ovens with automatic shut-offs. Keep a mop handy in the kitchen so you can wipe up spills instantly. You should also have a small fire extinguisher. Arrange your kitchen with frequently used items on lower shelves to avoid the need to stand on a stepstool to reach them. Make sure countertops are well-lit to avoid injuries while cutting and preparing food. In the Bathroom    Use a non-slip mat or decals in the tub and shower, since wet, soapy tile or porcelain surfaces are extremely slippery. Make sure bathroom rugs are non-skid or tape them firmly to the floor. Bathtubs should have at least one, preferably two, grab bars, firmly attached to structural supports in the wall. (Do not use built-in soap holders or glass shower doors as grab bars.)    Tub seats fitted with non-slip material on the legs allow you to wash sitting down.  For people with limited mobility, bathtub transfer benches allow you to slide safely into the tub. Raised toilet seats and toilet safety rails are helpful for those with knee or hip problems. In the [facility]    Make sure you use a nightlight and that the area around your bed is clear of potential obstacles. Be careful with electric blankets and never go to sleep with a heating pad, which can cause serious burns even if on a low setting. Use fire-resistant mattress covers and pillows, and NEVER smoke in bed. Keep a phone next to the bed that is programmed to dial 911 at the push of a button. If you have a chronic condition, you may want to sign on with an automatic call-in service. Typically the system includes a small pendant that connects directly to an emergency medical voice-response system. You should also make arrangements to stay in contact with someonefriend, neighbor, family memberon a regular schedule. Fire Prevention   According to the [employer]. (Smoke Alarms for Every) [address] [place] [address], senior citizens are one of the two highest risk groups for death and serious injuries due to residential fires. When cooking, wear short-sleeved items, never a bulky long-sleeved robe. The [facility]'s Safety Checklist for Older Consumers emphasizes the importance of checking basements, garages, workshops and storage areas for fire hazards, such as volatile liquids, piles of old rags or clothing and overloaded circuits. Never smoke in bed or when lying down on a couch or recliner chair. Small portable electric or kerosene heaters are responsible for many home fires and should be used cautiously if at all. If you do use one, be sure to keep them away from flammable materials. In case of fire, make sure you have a pre-established emergency exit plan. Have a professional check your fireplace and other fuel-burning appliances yearly.     Helping Hands   Baby boomers entering the arthur years will continue to see the development of new products to help older adults live safely and independently in spite of age-related changes. Making Life More Livable  , by Cheryl Tovar, lists over 1,000 products for \"living well in the mature years,\" such as bathing and mobility aids, household security devices, ergonomically designed knives and peelers, and faucet valves and knobs for temperature control. Medical supply stores and organizations are good sources of information about products that improve your quality of life and insure your safety.      Last Reviewed: November 2009 Melba Carmona MD   Updated: 3/7/2011

## 2023-01-23 NOTE — PROGRESS NOTES
Medicare Annual Wellness Visit    Karolina Vargas is here for Medicare AWV (Here for Medicare AWV)    Assessment & Plan    Recommendations for Preventive Services Due: see orders and patient instructions/AVS.  Recommended screening schedule for the next 5-10 years is provided to the patient in written form: see Patient Instructions/AVS.     No follow-ups on file.     Subjective     This encounter was performed under my, Pedro Parr MD’s, direct supervision, 1/23/2023.    Patient's complete Health Risk Assessment and screening values have been reviewed and are found in Flowsheets. The following problems were reviewed today and where indicated follow up appointments were made and/or referrals ordered.    Positive Risk Factor Screenings with Interventions:    Fall Risk:  Do you feel unsteady or are you worried about falling? : (!) yes (uses a cane for stabilty)  2 or more falls in past year?: (!) yes  Fall with injury in past year?: (!) yes     Interventions:    See AVS for additional education material                   ADL's:   Patient reports needing help with:  Select all that apply: (!) Walking/Balance (uses a cane for stability)    Interventions:  See AVS for additional education material                    Objective   Vitals:    01/23/23 1105   BP: 118/66   Pulse: 60   Resp: 16   Temp: 97.4 °F (36.3 °C)   TempSrc: Oral   SpO2: 98%   Weight: 146 lb (66.2 kg)   Height: 5' 4\" (1.626 m)      Body mass index is 25.06 kg/m².             Allergies   Allergen Reactions    Latex Rash     Tape, bandaids    Oxycodone Nausea And Vomiting     Dose-related, 1/2 tab was okay.    Pcn [Penicillins] Rash     As a child    Tape [Adhesive Tape] Rash    Tramadol Itching     Prior to Visit Medications    Medication Sig Taking? Authorizing Provider   sertraline (ZOLOFT) 100 MG tablet Take 1 tablet by mouth daily  Patient taking differently: Take 50 mg by mouth daily Yes Pedro Parr MD   levothyroxine (SYNTHROID) 75 MCG tablet  Take 1 tablet by mouth daily Yes Beverly Herrera MD   Handicap Placard MISC by Does not apply route Good for five years  Beverly Herrera MD   spironolactone (ALDACTONE) 25 MG tablet Take 1 tablet by mouth daily  Beverly Herrera MD       [facility] (Including outside providers/suppliers regularly involved in providing care):   Patient Care Team:  Beverly Herrera MD as PCP - General (Family Medicine)  Beverly Herrera MD as PCP - [facility]  Bertin Tanner MD (General Surgery)  Leonidas Rogers MD (Radiation Oncology)  Salvador Kapadia DO as Consulting Physician (Hematology and Oncology)     Reviewed and updated this visit:  Tobacco  Allergies  Meds  Med Hx  Surg Hx  Soc Hx  Fam Hx        I, Hayde Huddleston LPN, 7/50/[birth year]40, performed the documented evaluation under the direct supervision of the attending physician.

## 2023-01-23 NOTE — PROGRESS NOTES
Patient is seen in follow up for   Chief Complaint   Patient presents with    Annual Exam    Foot Injury     Dropped can on it last went to er still having trouble with it/right    Toe Pain     Thinks she has a hammer toe/left     Foot Injury   Pertinent negatives include no numbness. Toe Pain   Pertinent negatives include no numbness. here for follow up on foot pain after dropping a can on it and for left second toe hammer toe.     Past Medical History:   Diagnosis Date    Anxiety     Arthritis     Asthma     Breast cancer ([facility] [address]     Cancer ([facility] [address] 4/2015    Right breast cancer metastatic to axillary node    Chronic back pain     Chronic cholecystitis with calculus 2/1/2018    Depression     History of blood transfusion     History of therapeutic radiation     Hyperlipidemia     Hypertension     Multiple gallstones 10/3/2017    Prolonged emergence from general anesthesia     took 10-13 hours to wake up    SVT (supraventricular tachycardia) ([facility] [address] [facility]     Thyroid disease      Patient Active Problem List    Diagnosis Date Noted    Secondary and unspecified malignant neoplasm of axilla and upper limb lymph nodes ([facility] [address] [facility] 09/28/2018    Estrogen receptor negative status (ER-) 09/28/2018    Lobular carcinoma in situ of right breast 09/28/2018    Personal history of malignant neoplasm of breast 09/28/2018    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 ([facility] [address] 09/14/2016    Hypothyroid 08/05/2014    Colon polyps 08/05/2014    SVT (supraventricular tachycardia) (H[facility])     Hypertension     Hyperlipidemia     Asthma     Anxiety     Depression     Chronic back pain      Past Surgical History:   Procedure Laterality Date    BREAST BIOPSY Right 4/16/15    ULTRASOUND GUIDED BIOPSY RIGHT AXILLA, RIGHT AXILLARY DISSECTION,ULTRASOUND GUIDED RIGHT BREAST BIOPSY    BREAST LUMPECTOMY      CHOLECYSTECTOMY      COLONOSCOPY  9/12/14    polypectomy [email or web address]    COLONOSCOPY N/A 11/19/2020    COLORECTAL CANCER SCREENING, HIGH RISK performed by Claritza Dickey MD at [facility] N/A 4/13/2018    LAPAROSCOPIC CHOLECYSTECTOMY WITH GRAMS performed by Carlo Crane MD at [facility] CTR VAD W/SUBQ PORT/ CTR/PRPH INSJ N/A 2/21/2017    PORT REMOV[facility]      tail bone    TONSILLECTOMY      TUBAL LIGATION      TUNNELED VENOUS PORT PLACEMENT Left 06/02/15    SUBCLAVIAN VEIN    WRIST GANGLION EXCISION Left      Family History   Problem Relation Age of Onset    Heart Disease Mother     Colon Cancer Father     Colon Polyps Father     Other Father 58        Post Op    Other Brother         Accidental Shooting    Brain Cancer Child     Breast Cancer Maternal Grandmother      Social History     Socioeconomic History    Marital status:      Spouse name: None    Number of children: None    Years of education: None    Highest education level: None   Tobacco Use    Smoking status: Former     Types: Cigarettes    Smokeless tobacco: Never   Vaping Use    Vaping Use: Never used   Substance and Sexual Activity    Alcohol use: Yes     Comment: Last drink was (Aug 2020)    Drug use: No    Sexual activity: Not Currently     Social Determinants of Health     Financial Resource Strain: Low Risk     Difficulty of Paying Living Expenses: Not hard at all   Food Insecurity: No Food Insecurity    Worried About Running Out of Food in the Last Year: Never true    Ran Out of Food in the Last Year: Never true     Current Outpatient Medications   Medication Sig Dispense Refill    Handicap Placard MISC by Does not apply route Good for five years 1 each 0    sertraline (ZOLOFT) 100 MG tablet Take 1 tablet by mouth daily 90 tablet 3    spironolactone (ALDACTONE) 25 MG tablet Take 1 tablet by mouth daily 30 tablet 5    levothyroxine (SYNTHROID) 75 MCG tablet Take 1 tablet by mouth daily 90 tablet 3     No current facility-administered medications for this visit. Current Outpatient Medications on File Prior to Visit   Medication Sig Dispense Refill    levothyroxine (SYNTHROID) 75 MCG tablet Take 1 tablet by mouth daily 90 tablet 3     No current facility-administered medications on file prior to visit. Allergies   Allergen Reactions    Latex Rash     Tape, bandaids    Oxycodone Nausea And Vomiting     Dose-related, 1/2 tab was okay. Pcn [Penicillins] Rash     As a child    Tape Beronica Payer Tape] Rash    Tramadol Itching     Health Maintenance   Topic Date Due    Depression Monitoring  Never done    Shingles vaccine (2 of 2) 10/04/2020    COVID-19 Vaccine (4 - Booster) 07/20/2021    Annual Wellness Visit (AWV)  09/09/2021    Flu vaccine (1) 08/01/2022    DTaP/Tdap/Td vaccine (2 - Td or Tdap) 06/11/2031    Pneumococcal 65+ years Vaccine  Completed    Hepatitis A vaccine  Aged Out    Hib vaccine  Aged Out    Meningococcal (ACWY) vaccine  Aged Out       Review of Systems     Review of Systems   Constitutional:  Negative for activity change, appetite change, fatigue and fever. HENT:  Negative for congestion and rhinorrhea. Eyes: Negative. Respiratory: Negative. Negative for cough and chest tightness. Cardiovascular: Negative. Gastrointestinal: Negative. Endocrine: Negative. Genitourinary: Negative. Musculoskeletal: Negative. Skin: Negative. Neurological:  Negative for dizziness, light-headedness and numbness. Hematological: Negative. Psychiatric/Behavioral: Negative.        Physical Exam  Vitals:    01/23/23 1034   BP: 118/66   Pulse: 60   Resp: 16   Temp: 97.4 °F (36.3 °C)   SpO2: 98%   Weight: 146 lb (66.2 kg)   Height: 5' 4\" (1.626 m)       Physical Exam  Constitutional:       Appearance: She is well-developed. HENT:      Right Ear: External ear normal.      Left Ear: External ear normal.   Eyes:      Pupils: Pupils are equal, round, and reactive to light. Neck:      Thyroid: No thyromegaly. Cardiovascular:      Rate and Rhythm: Normal rate and regular rhythm. Heart sounds: Normal heart sounds. No murmur heard. No friction rub. No gallop. Pulmonary:      Effort: Pulmonary effort is normal. No respiratory distress. Breath sounds: No wheezing or rales. Chest:      Chest wall: No tenderness. Abdominal:      General: Bowel sounds are normal. There is no distension. Palpations: Abdomen is soft. There is no mass. Tenderness: There is no abdominal tenderness. There is no guarding or rebound. Musculoskeletal:         General: Normal range of motion. Cervical back: Normal range of motion and neck supple. Lymphadenopathy:      Cervical: No cervical adenopathy. Skin:     General: Skin is warm and dry. Neurological:      Mental Status: She is alert and oriented to person, place, and time. Cranial Nerves: No cranial nerve deficit. Coordination: Coordination normal.       Assessment   Diagnosis Orders   1. Hammer toe of left foot  [facility], DPM, Podiatry, [place]      2.  Foot pain, right  [facility] [place] [facility], DPM, Podiatry, [place]        Problem List    None    Plan  Orders Placed This Encounter   Procedures    Eric Guardado DPM, Podiatry, [place]     Referral Priority:   Routine     Referral Type:   Eval and Treat     Referral Reason:   Specialty Services Required     Referred to Provider:   Elodia Marsh DPM     Requested Specialty:   Podiatry     Number of Visits Requested:   1     Orders Placed This Encounter   Medications    Handicap Placard MISC     Sig: by Does not apply route Good for five years     Dispense:  1 each     Refill:  0    sertraline (ZOLOFT) 100 MG tablet     Sig: Take 1 tablet by mouth daily     Dispense:  90 tablet     Refill:  3    spironolactone (ALDACTONE) 25 MG tablet     Sig: Take 1 tablet by mouth daily     Dispense:  30 tablet     Refill:  5     No follow-ups on file.   Woodrow Berger MD

## 2023-01-25 ENCOUNTER — TELEPHONE (OUTPATIENT)
Dept: FAMILY MEDICINE CLINIC | Age: 68
End: 2023-01-25

## 2023-01-25 DIAGNOSIS — M20.42 HAMMER TOE OF LEFT FOOT: Primary | ICD-10-CM

## 2023-01-25 NOTE — TELEPHONE ENCOUNTER
Patient received a call from Dr. Dmitriy Muñoz office advising her to contact the office of her PCP to get authorization from her insurance company so she can schedule with Podiatry. Please advise. Thank you.

## 2023-02-27 RX ORDER — LEVOTHYROXINE SODIUM 0.07 MG/1
75 TABLET ORAL DAILY
Qty: 90 TABLET | Refills: 3 | Status: SHIPPED | OUTPATIENT
Start: 2023-02-27

## 2023-02-27 NOTE — TELEPHONE ENCOUNTER
Patient also asking for her acid reflux RX but forgot what it was called     Please send these both into [address] [place] [address].

## 2023-04-05 RX ORDER — PANTOPRAZOLE SODIUM 20 MG/1
20 TABLET, DELAYED RELEASE ORAL DAILY
Qty: 30 TABLET | Refills: 3 | Status: SHIPPED | OUTPATIENT
Start: 2023-04-05

## 2023-04-05 NOTE — TELEPHONE ENCOUNTER
Patient is requesting this medication for her acid reflux.         Rx requested:  Requested Prescriptions     Pending Prescriptions Disp Refills    pantoprazole (PROTONIX) 20 MG tablet 30 tablet      Sig: Take 1 tablet by mouth daily         Last Office Visit:   1/23/2023      Next Visit Date:  Future Appointments   Date Time Provider Sumaya Villalobos   5/22/2023  9:30 AM Syeda Murrieta DPM MLOX OP POD [facility] EMERGENCY [facility] AT [place]   7/24/2023 10:45 AM Shaun Pisano  [place], [place] [address]

## 2023-06-09 RX ORDER — PANTOPRAZOLE SODIUM 20 MG/1
20 TABLET, DELAYED RELEASE ORAL DAILY
Qty: 90 TABLET | Refills: 3 | Status: SHIPPED | OUTPATIENT
Start: 2023-06-09

## 2023-07-12 ENCOUNTER — INITIAL CONSULT (OUTPATIENT)
Age: 68
End: 2023-07-12
Payer: MEDICARE

## 2023-07-12 VITALS — HEIGHT: 63 IN | BODY MASS INDEX: 25.69 KG/M2 | WEIGHT: 145 LBS | TEMPERATURE: 97.1 F

## 2023-07-12 DIAGNOSIS — M20.42 HAMMERTOES OF BOTH FEET: ICD-10-CM

## 2023-07-12 DIAGNOSIS — M20.41 HAMMERTOES OF BOTH FEET: ICD-10-CM

## 2023-07-12 DIAGNOSIS — M20.5X2 ACQUIRED HALLUX LIMITUS OF BOTH FEET: ICD-10-CM

## 2023-07-12 DIAGNOSIS — M19.071 ARTHRITIS OF BOTH FEET: ICD-10-CM

## 2023-07-12 DIAGNOSIS — M20.12 HALLUX ABDUCTO VALGUS, BILATERAL: ICD-10-CM

## 2023-07-12 DIAGNOSIS — M79.671 RIGHT FOOT PAIN: Primary | ICD-10-CM

## 2023-07-12 DIAGNOSIS — M20.5X1 ACQUIRED HALLUX LIMITUS OF BOTH FEET: ICD-10-CM

## 2023-07-12 DIAGNOSIS — M20.11 HALLUX ABDUCTO VALGUS, BILATERAL: ICD-10-CM

## 2023-07-12 DIAGNOSIS — G57.61 MORTON'S NEUROMA, RIGHT: ICD-10-CM

## 2023-07-12 DIAGNOSIS — M19.072 ARTHRITIS OF BOTH FEET: ICD-10-CM

## 2023-07-12 PROCEDURE — 99203 OFFICE O/P NEW LOW 30 MIN: CPT | Performed by: PODIATRIST

## 2023-07-12 PROCEDURE — 1123F ACP DISCUSS/DSCN MKR DOCD: CPT | Performed by: PODIATRIST

## 2023-07-12 ASSESSMENT — ENCOUNTER SYMPTOMS
SHORTNESS OF BREATH: 1
BACK PAIN: 1
VOMITING: 0
NAUSEA: 0

## 2023-07-12 NOTE — PROGRESS NOTES
COMMON DIGITAL NERVES     3rd interspace     Standing Status:   Future     Standing Expiration Date:   10/12/2023       All questions were answered to the patient's satisfaction. Thank you for allowing me to participate in the care of your patient. Follow up:  Return for an injection right foot. Marie Hooker DPM      Level of medical decision making: low. Please note that this report has been partially produced using speech recognition software which may cause errors including grammar, punctuation, and spelling or words and phrases that may seem inappropriate. If there are questions or concerns please feel free to contact me for clarification.

## 2023-07-26 ENCOUNTER — OFFICE VISIT (OUTPATIENT)
Age: 68
End: 2023-07-26
Payer: MEDICARE

## 2023-07-26 VITALS — HEIGHT: 64 IN | TEMPERATURE: 97.1 F | BODY MASS INDEX: 25.1 KG/M2 | WEIGHT: 147 LBS

## 2023-07-26 DIAGNOSIS — G57.61 MORTON'S NEUROMA, RIGHT: ICD-10-CM

## 2023-07-26 DIAGNOSIS — M79.671 RIGHT FOOT PAIN: ICD-10-CM

## 2023-07-26 PROCEDURE — 64455 NJX AA&/STRD PLTR COM DG NRV: CPT | Performed by: PODIATRIST

## 2023-07-26 PROCEDURE — 99999 PR OFFICE/OUTPT VISIT,PROCEDURE ONLY: CPT | Performed by: PODIATRIST

## 2023-07-26 RX ORDER — LIDOCAINE HYDROCHLORIDE 10 MG/ML
0.5 INJECTION, SOLUTION INFILTRATION; PERINEURAL ONCE
Status: COMPLETED | OUTPATIENT
Start: 2023-07-26 | End: 2023-07-26

## 2023-07-26 RX ORDER — BETAMETHASONE SODIUM PHOSPHATE AND BETAMETHASONE ACETATE 3; 3 MG/ML; MG/ML
6 INJECTION, SUSPENSION INTRA-ARTICULAR; INTRALESIONAL; INTRAMUSCULAR; SOFT TISSUE ONCE
Status: COMPLETED | OUTPATIENT
Start: 2023-07-26 | End: 2023-07-26

## 2023-07-26 RX ADMIN — BETAMETHASONE SODIUM PHOSPHATE AND BETAMETHASONE ACETATE 6 MG: 3; 3 INJECTION, SUSPENSION INTRA-ARTICULAR; INTRALESIONAL; INTRAMUSCULAR; SOFT TISSUE at 13:08

## 2023-07-26 RX ADMIN — LIDOCAINE HYDROCHLORIDE 0.5 ML: 10 INJECTION, SOLUTION INFILTRATION; PERINEURAL at 13:08

## 2023-07-26 ASSESSMENT — ENCOUNTER SYMPTOMS
NAUSEA: 0
BACK PAIN: 0
SHORTNESS OF BREATH: 0
VOMITING: 0

## 2023-07-26 NOTE — PROGRESS NOTES
[address] [place] [facility] [address]  [place] [address] [facility]  Dept: [phone]  Loc: [phone]       Amie Shannon  ([birth year])    7/26/23    Subjective     Amie Shannon is 79 y.o. female who complains today of:    Chief Complaint   Patient presents with    Foot Pain     Right foot injection       Foot Pain   Pertinent negatives include no fever or numbness. HPI: Patient presents for previously scheduled injection of the right foot neuroma, she denies significant improvement since her last visit and wishes to proceed with the injection. Review of Systems   Constitutional:  Negative for chills and fever. HENT:  Negative for hearing loss. Respiratory:  Negative for shortness of breath. Cardiovascular:  Negative for chest pain. Gastrointestinal:  Negative for nausea and vomiting. Genitourinary:  Negative for difficulty urinating. Musculoskeletal:  Positive for gait problem. Negative for back pain. Skin:  Negative for wound. Neurological:  Negative for numbness. Hematological:  Does not bruise/bleed easily. Psychiatric/Behavioral:  Negative for sleep disturbance. Allergies:  Latex, Oxycodone, Pcn [penicillins], Tape [adhesive tape], and Tramadol    Current Outpatient Medications on File Prior to Visit   Medication Sig Dispense Refill    pantoprazole (PROTONIX) 20 MG tablet Take 1 tablet by mouth daily 90 tablet 3    levothyroxine (SYNTHROID) 75 MCG tablet Take 1 tablet by mouth daily 90 tablet 3    Handicap Placard MISC by Does not apply route Good for five years 1 each 0    sertraline (ZOLOFT) 100 MG tablet Take 1 tablet by mouth daily (Patient taking differently: Take 0.5 tablets by mouth daily) 90 tablet 3    spironolactone (ALDACTONE) 25 MG tablet Take 1 tablet by mouth daily 30 tablet 5     No current facility-administered medications on file prior to visit.        Past Medical History:

## 2023-08-23 ENCOUNTER — TELEPHONE (OUTPATIENT)
Dept: FAMILY MEDICINE CLINIC | Age: 68
End: 2023-08-23

## 2023-08-23 DIAGNOSIS — Z12.31 ENCOUNTER FOR SCREENING MAMMOGRAM FOR MALIGNANT NEOPLASM OF BREAST: Primary | ICD-10-CM

## 2023-08-23 NOTE — TELEPHONE ENCOUNTER
----- Message from [facility] sent at 8/23/2023  1:23 PM EDT -----  Subject: Referral Request    Reason for referral request? Pt is requesting a referral to have a   mammogram.  Provider patient wants to be referred to(if known):     Provider Phone Number(if known):     Additional Information for Provider?   ---------------------------------------------------------------------------  --------------  Diana [place] Yinka    [phone]; OK to leave message on voicemail  ---------------------------------------------------------------------------  --------------

## 2023-08-31 ENCOUNTER — TRANSCRIBE ORDERS (OUTPATIENT)
Dept: ADMINISTRATIVE | Age: 68
End: 2023-08-31

## 2023-08-31 DIAGNOSIS — Z85.3 PERSONAL HISTORY OF MALIGNANT NEOPLASM OF BREAST: Primary | ICD-10-CM

## 2023-09-20 ENCOUNTER — OFFICE VISIT (OUTPATIENT)
Age: 68
End: 2023-09-20
Payer: MEDICARE

## 2023-09-20 VITALS — TEMPERATURE: 97.4 F | HEIGHT: 63 IN | WEIGHT: 149 LBS | BODY MASS INDEX: 26.4 KG/M2

## 2023-09-20 DIAGNOSIS — G57.61 MORTON'S NEUROMA, RIGHT: ICD-10-CM

## 2023-09-20 DIAGNOSIS — M79.671 RIGHT FOOT PAIN: Primary | ICD-10-CM

## 2023-09-20 PROCEDURE — 99213 OFFICE O/P EST LOW 20 MIN: CPT | Performed by: PODIATRIST

## 2023-09-20 PROCEDURE — 64455 NJX AA&/STRD PLTR COM DG NRV: CPT | Performed by: PODIATRIST

## 2023-09-20 PROCEDURE — 1123F ACP DISCUSS/DSCN MKR DOCD: CPT | Performed by: PODIATRIST

## 2023-09-20 RX ORDER — BETAMETHASONE SODIUM PHOSPHATE AND BETAMETHASONE ACETATE 3; 3 MG/ML; MG/ML
6 INJECTION, SUSPENSION INTRA-ARTICULAR; INTRALESIONAL; INTRAMUSCULAR; SOFT TISSUE ONCE
Status: COMPLETED | OUTPATIENT
Start: 2023-09-20 | End: 2023-09-20

## 2023-09-20 RX ORDER — LIDOCAINE HYDROCHLORIDE 10 MG/ML
0.5 INJECTION, SOLUTION EPIDURAL; INFILTRATION; INTRACAUDAL; PERINEURAL ONCE
Status: COMPLETED | OUTPATIENT
Start: 2023-09-20 | End: 2023-09-20

## 2023-09-20 RX ADMIN — LIDOCAINE HYDROCHLORIDE 0.5 ML: 10 INJECTION, SOLUTION EPIDURAL; INFILTRATION; INTRACAUDAL; PERINEURAL at 12:22

## 2023-09-20 RX ADMIN — BETAMETHASONE SODIUM PHOSPHATE AND BETAMETHASONE ACETATE 6 MG: 3; 3 INJECTION, SUSPENSION INTRA-ARTICULAR; INTRALESIONAL; INTRAMUSCULAR; SOFT TISSUE at 12:22

## 2023-09-20 ASSESSMENT — ENCOUNTER SYMPTOMS
BACK PAIN: 1
VOMITING: 0
NAUSEA: 0
SHORTNESS OF BREATH: 0

## 2023-09-20 NOTE — PROGRESS NOTES
[address] [place]   [place] [address]  [place] [address] [facility]  Dept: [phone]  Loc: [phone]       Marlen Stout  ([birth year])    9/20/23    Subjective     Marlen Stout is 76 y.o. female who complains today of:    Chief Complaint   Patient presents with    Foot Pain     Right        Foot Pain   Pertinent negatives include no fever or numbness. HPI: Patient presents for follow-up for neuroma of the right forefoot. Patient reports that she had approximately 2 weeks of significant relief after the injection performed at her last visit. Patient states the pain has slowly increased to her baseline level and has radiating to the leg and knee. Patient reports compliance with the other conservative measures that were enacted previously. Review of Systems   Constitutional:  Negative for chills and fever. HENT:  Negative for hearing loss. Respiratory:  Negative for shortness of breath. Cardiovascular:  Negative for chest pain. Gastrointestinal:  Negative for nausea and vomiting. Genitourinary:  Negative for difficulty urinating. Musculoskeletal:  Positive for back pain. Negative for gait problem. Skin:  Negative for wound. Neurological:  Negative for numbness. Hematological:  Does not bruise/bleed easily. Psychiatric/Behavioral:  Negative for sleep disturbance.         Allergies:  Latex, Oxycodone, Pcn [penicillins], Tape [adhesive tape], and Tramadol    Current Outpatient Medications on File Prior to Visit   Medication Sig Dispense Refill    pantoprazole (PROTONIX) 20 MG tablet Take 1 tablet by mouth daily 90 tablet 3    levothyroxine (SYNTHROID) 75 MCG tablet Take 1 tablet by mouth daily 90 tablet 3    Handicap Placard MISC by Does not apply route Good for five years 1 each 0    sertraline (ZOLOFT) 100 MG tablet Take 1 tablet by mouth daily (Patient taking differently: Take 0.5 tablets by mouth daily) 90

## 2023-09-25 ENCOUNTER — HOSPITAL ENCOUNTER (OUTPATIENT)
Dept: ULTRASOUND IMAGING | Age: 68
End: 2023-09-25
Payer: MEDICARE

## 2023-09-25 ENCOUNTER — HOSPITAL ENCOUNTER (OUTPATIENT)
Dept: WOMENS IMAGING | Age: 68
Discharge: HOME | End: 2023-09-27
Payer: MEDICARE

## 2023-09-25 VITALS — HEIGHT: 64 IN | BODY MASS INDEX: 25.98 KG/M2

## 2023-09-25 DIAGNOSIS — Z85.3 PERSONAL HISTORY OF MALIGNANT NEOPLASM OF BREAST: ICD-10-CM

## 2023-09-25 PROCEDURE — G0279 TOMOSYNTHESIS, MAMMO: HCPCS

## 2023-12-07 SDOH — ECONOMIC STABILITY: INCOME INSECURITY: HOW HARD IS IT FOR YOU TO PAY FOR THE VERY BASICS LIKE FOOD, HOUSING, MEDICAL CARE, AND HEATING?: NOT HARD AT ALL

## 2023-12-07 SDOH — ECONOMIC STABILITY: HOUSING INSECURITY
IN THE LAST 12 MONTHS, WAS THERE A TIME WHEN YOU DID NOT HAVE A STEADY PLACE TO SLEEP OR SLEPT IN A SHELTER (INCLUDING NOW)?: NO

## 2023-12-07 SDOH — ECONOMIC STABILITY: FOOD INSECURITY: WITHIN THE PAST 12 MONTHS, THE FOOD YOU BOUGHT JUST DIDN'T LAST AND YOU DIDN'T HAVE MONEY TO GET MORE.: SOMETIMES TRUE

## 2023-12-07 SDOH — ECONOMIC STABILITY: FOOD INSECURITY: WITHIN THE PAST 12 MONTHS, YOU WORRIED THAT YOUR FOOD WOULD RUN OUT BEFORE YOU GOT MONEY TO BUY MORE.: SOMETIMES TRUE

## 2023-12-08 ENCOUNTER — OFFICE VISIT (OUTPATIENT)
Dept: FAMILY MEDICINE CLINIC | Age: 68
End: 2023-12-08
Payer: MEDICARE

## 2023-12-08 VITALS
RESPIRATION RATE: 16 BRPM | HEART RATE: 74 BPM | OXYGEN SATURATION: 98 % | BODY MASS INDEX: 25.58 KG/M2 | SYSTOLIC BLOOD PRESSURE: 122 MMHG | DIASTOLIC BLOOD PRESSURE: 78 MMHG | HEIGHT: 64 IN

## 2023-12-08 DIAGNOSIS — C77.3 SECONDARY AND UNSPECIFIED MALIGNANT NEOPLASM OF AXILLA AND UPPER LIMB LYMPH NODES (HCC): ICD-10-CM

## 2023-12-08 DIAGNOSIS — J40 BRONCHITIS: ICD-10-CM

## 2023-12-08 DIAGNOSIS — H61.002 CHONDRODERMATITIS NODULARIS CHRONICA HELICIS, LEFT: Primary | ICD-10-CM

## 2023-12-08 PROCEDURE — 1090F PRES/ABSN URINE INCON ASSESS: CPT | Performed by: FAMILY MEDICINE

## 2023-12-08 PROCEDURE — 1036F TOBACCO NON-USER: CPT | Performed by: FAMILY MEDICINE

## 2023-12-08 PROCEDURE — 3078F DIAST BP <80 MM HG: CPT | Performed by: FAMILY MEDICINE

## 2023-12-08 PROCEDURE — 1123F ACP DISCUSS/DSCN MKR DOCD: CPT | Performed by: FAMILY MEDICINE

## 2023-12-08 PROCEDURE — G8400 PT W/DXA NO RESULTS DOC: HCPCS | Performed by: FAMILY MEDICINE

## 2023-12-08 PROCEDURE — G8484 FLU IMMUNIZE NO ADMIN: HCPCS | Performed by: FAMILY MEDICINE

## 2023-12-08 PROCEDURE — 3074F SYST BP LT 130 MM HG: CPT | Performed by: FAMILY MEDICINE

## 2023-12-08 PROCEDURE — G8427 DOCREV CUR MEDS BY ELIG CLIN: HCPCS | Performed by: FAMILY MEDICINE

## 2023-12-08 PROCEDURE — 99213 OFFICE O/P EST LOW 20 MIN: CPT | Performed by: FAMILY MEDICINE

## 2023-12-08 PROCEDURE — G8417 CALC BMI ABV UP PARAM F/U: HCPCS | Performed by: FAMILY MEDICINE

## 2023-12-08 PROCEDURE — 3017F COLORECTAL CA SCREEN DOC REV: CPT | Performed by: FAMILY MEDICINE

## 2023-12-08 RX ORDER — AZITHROMYCIN 250 MG/1
250 TABLET, FILM COATED ORAL SEE ADMIN INSTRUCTIONS
Qty: 6 TABLET | Refills: 0 | Status: SHIPPED | OUTPATIENT
Start: 2023-12-08 | End: 2023-12-13

## 2023-12-08 ASSESSMENT — ENCOUNTER SYMPTOMS
CHEST TIGHTNESS: 0
RHINORRHEA: 0
GASTROINTESTINAL NEGATIVE: 1
COUGH: 1
EYES NEGATIVE: 1

## 2023-12-08 NOTE — PROGRESS NOTES
Patient is seen in follow up for   Chief Complaint   Patient presents with    Cough    Fatigue    Mass     Left ear     Cough  This is a chronic problem. The current episode started 1 to 4 weeks ago. The problem has been unchanged. The cough is Productive of sputum. Pertinent negatives include no fever or rhinorrhea. She has tried nothing for the symptoms. The treatment provided no relief. Fatigue  Associated symptoms include coughing and fatigue. Pertinent negatives include no congestion, fever or numbness. Mass  Associated symptoms include coughing and fatigue. Pertinent negatives include no congestion, fever or numbness. Right ear mass for a year slowly getting bigger.     Past Medical History:   Diagnosis Date    Anxiety     Arthritis     Asthma     Breast cancer ([address])     Cancer ([address]) 04/2015    Right breast cancer metastatic to axillary node    Chronic back pain     Chronic cholecystitis with calculus 02/01/2018    Depression     Diffuse cystic mastopathy     History of blood transfusion     History of therapeutic radiation     Hyperlipidemia     Hypertension     Multiple gallstones 10/03/2017    Prolonged emergence from general anesthesia     took 10-13 hours to wake up    SVT (supraventricular tachycardia)     Thyroid disease      Patient Active Problem List    Diagnosis Date Noted    Secondary and unspecified malignant neoplasm of axilla and upper limb lymph nodes ([address]) 09/28/2018    Estrogen receptor negative status (ER-) 09/28/2018    Lobular carcinoma in situ of right breast 09/28/2018    Personal history of malignant neoplasm of breast 09/28/2018    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

## 2024-01-05 ENCOUNTER — OFFICE VISIT (OUTPATIENT)
Dept: PRIMARY CARE CLINIC | Age: 69
End: 2024-01-05

## 2024-01-05 VITALS
WEIGHT: 157.4 LBS | OXYGEN SATURATION: 99 % | BODY MASS INDEX: 26.87 KG/M2 | SYSTOLIC BLOOD PRESSURE: 172 MMHG | DIASTOLIC BLOOD PRESSURE: 94 MMHG | HEIGHT: 64 IN | HEART RATE: 55 BPM

## 2024-01-05 DIAGNOSIS — N64.4 PAIN OF RIGHT BREAST: ICD-10-CM

## 2024-01-05 DIAGNOSIS — N63.10 MASS OF RIGHT BREAST, UNSPECIFIED QUADRANT: ICD-10-CM

## 2024-01-05 DIAGNOSIS — R42 DIZZINESS: ICD-10-CM

## 2024-01-05 DIAGNOSIS — I10 PRIMARY HYPERTENSION: Primary | ICD-10-CM

## 2024-01-05 PROBLEM — C77.3 SECONDARY AND UNSPECIFIED MALIGNANT NEOPLASM OF AXILLA AND UPPER LIMB LYMPH NODES (HCC): Status: RESOLVED | Noted: 2018-09-28 | Resolved: 2024-01-05

## 2024-01-05 RX ORDER — LOSARTAN POTASSIUM 25 MG/1
25 TABLET ORAL DAILY
Qty: 90 TABLET | Refills: 3 | Status: SHIPPED | OUTPATIENT
Start: 2024-01-05 | End: 2024-01-05

## 2024-01-05 RX ORDER — HYDROCHLOROTHIAZIDE 25 MG/1
25 TABLET ORAL EVERY MORNING
Qty: 90 TABLET | Refills: 1 | Status: SHIPPED | OUTPATIENT
Start: 2024-01-05 | End: 2024-01-05

## 2024-01-05 RX ORDER — HYDROCHLOROTHIAZIDE 25 MG/1
25 TABLET ORAL EVERY MORNING
Qty: 90 TABLET | Refills: 3 | Status: SHIPPED | OUTPATIENT
Start: 2024-01-05

## 2024-01-05 ASSESSMENT — PATIENT HEALTH QUESTIONNAIRE - PHQ9
8. MOVING OR SPEAKING SO SLOWLY THAT OTHER PEOPLE COULD HAVE NOTICED. OR THE OPPOSITE, BEING SO FIGETY OR RESTLESS THAT YOU HAVE BEEN MOVING AROUND A LOT MORE THAN USUAL: 0
9. THOUGHTS THAT YOU WOULD BE BETTER OFF DEAD, OR OF HURTING YOURSELF: 0
7. TROUBLE CONCENTRATING ON THINGS, SUCH AS READING THE NEWSPAPER OR WATCHING TELEVISION: 0
2. FEELING DOWN, DEPRESSED OR HOPELESS: 0
6. FEELING BAD ABOUT YOURSELF - OR THAT YOU ARE A FAILURE OR HAVE LET YOURSELF OR YOUR FAMILY DOWN: 0
10. IF YOU CHECKED OFF ANY PROBLEMS, HOW DIFFICULT HAVE THESE PROBLEMS MADE IT FOR YOU TO DO YOUR WORK, TAKE CARE OF THINGS AT HOME, OR GET ALONG WITH OTHER PEOPLE: 0
SUM OF ALL RESPONSES TO PHQ QUESTIONS 1-9: 0
3. TROUBLE FALLING OR STAYING ASLEEP: 0
4. FEELING TIRED OR HAVING LITTLE ENERGY: 0
SUM OF ALL RESPONSES TO PHQ QUESTIONS 1-9: 0
5. POOR APPETITE OR OVEREATING: 0
SUM OF ALL RESPONSES TO PHQ QUESTIONS 1-9: 0
SUM OF ALL RESPONSES TO PHQ QUESTIONS 1-9: 0

## 2024-01-05 NOTE — PROGRESS NOTES
[facility]  [address]  [address]  [facility] [address]  Dept: [phone]  Dept Fax: [phone]  Loc: [phone]     1/5/2024    Visit type: office visit     Reason for Visit: Breast Pain (Left side past 2 days and lump on the right side ), Dizziness (2 weeks off and on ), and Hypertension (Blood pressure has been running high.  She used to be on blood pressure meds  and stopped taking them about a year ago. )       ASSESSMENT/PLAN   1. Primary hypertension  Assessment & Plan:   Uncontrolled , uncomplicated   - yesterday was 190 systolic   - bp today of 172/94  - used to be on medications but she stopped it by herself one year ago because her bp was controlled   - currently without pain   - no current vision changes   - currently has a headache  - declining going to the emergency room for bp management   - start hctz 25 mg daily  - will hold off on amlodipine due to lymphedema in right arm   - hold off on losartan due to HR of 55   Orders:  -     Basic Metabolic Panel; Future  -     CBC; Future  -     Magnesium; Future  -     hydroCHLOROthiazide (HYDRODIURIL) 25 MG tablet; Take 1 tablet by mouth every morning, Disp-90 tablet, R-3Normal  2. Pain of right breast  -     US BREAST COMPLETE RIGHT; Future  -     XR CHEST STANDARD (2 VW); Future  3. Mass of right breast, unspecified quadrant  -     US BREAST COMPLETE RIGHT; Future  -     XR CHEST STANDARD (2 VW); Future  4. Dizziness  -     Basic Metabolic Panel; Future  -     CBC; Future  -     Magnesium; Future    Return in about 3 days (around 1/8/2024) for f/u BP .  Orders Placed This Encounter   Medications    DISCONTD: hydroCHLOROthiazide (HYDRODIURIL) 25 MG tablet     Sig: Take 1 tablet by mouth every morning     Dispense:  90 tablet     Refill:  1    DISCONTD: losartan (COZAAR) 25 MG tablet     Sig: Take 1 tablet by mouth daily     Dispense:  90 tablet     Refill:  3

## 2024-01-05 NOTE — ASSESSMENT & PLAN NOTE
Uncontrolled , uncomplicated   - yesterday was 190 systolic   - bp today of 172/94  - used to be on medications but she stopped it by herself one year ago because her bp was controlled   - currently without pain   - no current vision changes   - currently has a headache  - declining going to the emergency room for bp management   - start hctz 25 mg daily  - will hold off on amlodipine due to lymphedema in right arm   - hold off on losartan due to HR of 55

## 2024-02-04 SDOH — HEALTH STABILITY: PHYSICAL HEALTH: ON AVERAGE, HOW MANY MINUTES DO YOU ENGAGE IN EXERCISE AT THIS LEVEL?: 20 MIN

## 2024-02-04 SDOH — HEALTH STABILITY: PHYSICAL HEALTH: ON AVERAGE, HOW MANY DAYS PER WEEK DO YOU ENGAGE IN MODERATE TO STRENUOUS EXERCISE (LIKE A BRISK WALK)?: 2 DAYS

## 2024-02-04 ASSESSMENT — PATIENT HEALTH QUESTIONNAIRE - PHQ9
SUM OF ALL RESPONSES TO PHQ QUESTIONS 1-9: 2
SUM OF ALL RESPONSES TO PHQ QUESTIONS 1-9: 2
SUM OF ALL RESPONSES TO PHQ9 QUESTIONS 1 & 2: 2
1. LITTLE INTEREST OR PLEASURE IN DOING THINGS: 0
2. FEELING DOWN, DEPRESSED OR HOPELESS: 2
SUM OF ALL RESPONSES TO PHQ QUESTIONS 1-9: 2
SUM OF ALL RESPONSES TO PHQ QUESTIONS 1-9: 2

## 2024-02-04 ASSESSMENT — LIFESTYLE VARIABLES
HOW OFTEN DO YOU HAVE A DRINK CONTAINING ALCOHOL: 1
HOW OFTEN DO YOU HAVE A DRINK CONTAINING ALCOHOL: NEVER
HOW OFTEN DO YOU HAVE SIX OR MORE DRINKS ON ONE OCCASION: 1
HOW MANY STANDARD DRINKS CONTAINING ALCOHOL DO YOU HAVE ON A TYPICAL DAY: PATIENT DOES NOT DRINK
HOW MANY STANDARD DRINKS CONTAINING ALCOHOL DO YOU HAVE ON A TYPICAL DAY: 0

## 2024-02-07 ENCOUNTER — OFFICE VISIT (OUTPATIENT)
Dept: FAMILY MEDICINE CLINIC | Age: 69
End: 2024-02-07
Payer: MEDICAID

## 2024-02-07 VITALS
WEIGHT: 157 LBS | OXYGEN SATURATION: 96 % | HEART RATE: 69 BPM | RESPIRATION RATE: 16 BRPM | DIASTOLIC BLOOD PRESSURE: 78 MMHG | SYSTOLIC BLOOD PRESSURE: 156 MMHG | BODY MASS INDEX: 26.8 KG/M2 | OXYGEN SATURATION: 96 % | DIASTOLIC BLOOD PRESSURE: 78 MMHG | HEIGHT: 64 IN | RESPIRATION RATE: 16 BRPM | BODY MASS INDEX: 27.02 KG/M2 | HEART RATE: 69 BPM | HEIGHT: 64 IN | SYSTOLIC BLOOD PRESSURE: 156 MMHG

## 2024-02-07 DIAGNOSIS — I10 PRIMARY HYPERTENSION: Primary | ICD-10-CM

## 2024-02-07 DIAGNOSIS — C50.611 MALIGNANT NEOPLASM OF AXILLARY TAIL OF RIGHT FEMALE BREAST, UNSPECIFIED ESTROGEN RECEPTOR STATUS (HCC): ICD-10-CM

## 2024-02-07 DIAGNOSIS — Z00.00 MEDICARE ANNUAL WELLNESS VISIT, SUBSEQUENT: Primary | ICD-10-CM

## 2024-02-07 PROCEDURE — G0439 PPPS, SUBSEQ VISIT: HCPCS | Performed by: FAMILY MEDICINE

## 2024-02-07 PROCEDURE — G8427 DOCREV CUR MEDS BY ELIG CLIN: HCPCS | Performed by: FAMILY MEDICINE

## 2024-02-07 PROCEDURE — 1036F TOBACCO NON-USER: CPT | Performed by: FAMILY MEDICINE

## 2024-02-07 PROCEDURE — 99213 OFFICE O/P EST LOW 20 MIN: CPT | Performed by: FAMILY MEDICINE

## 2024-02-07 PROCEDURE — 1123F ACP DISCUSS/DSCN MKR DOCD: CPT | Performed by: FAMILY MEDICINE

## 2024-02-07 PROCEDURE — G8484 FLU IMMUNIZE NO ADMIN: HCPCS | Performed by: FAMILY MEDICINE

## 2024-02-07 PROCEDURE — 3077F SYST BP >= 140 MM HG: CPT | Performed by: FAMILY MEDICINE

## 2024-02-07 PROCEDURE — 3078F DIAST BP <80 MM HG: CPT | Performed by: FAMILY MEDICINE

## 2024-02-07 PROCEDURE — 1090F PRES/ABSN URINE INCON ASSESS: CPT | Performed by: FAMILY MEDICINE

## 2024-02-07 PROCEDURE — 3017F COLORECTAL CA SCREEN DOC REV: CPT | Performed by: FAMILY MEDICINE

## 2024-02-07 PROCEDURE — G8417 CALC BMI ABV UP PARAM F/U: HCPCS | Performed by: FAMILY MEDICINE

## 2024-02-07 PROCEDURE — G8400 PT W/DXA NO RESULTS DOC: HCPCS | Performed by: FAMILY MEDICINE

## 2024-02-07 RX ORDER — LISINOPRIL 20 MG/1
20 TABLET ORAL DAILY
Qty: 90 TABLET | Refills: 1 | Status: SHIPPED | OUTPATIENT
Start: 2024-02-07

## 2024-02-07 ASSESSMENT — PATIENT HEALTH QUESTIONNAIRE - PHQ9
5. POOR APPETITE OR OVEREATING: 0
1. LITTLE INTEREST OR PLEASURE IN DOING THINGS: 0
9. THOUGHTS THAT YOU WOULD BE BETTER OFF DEAD, OR OF HURTING YOURSELF: 0
4. FEELING TIRED OR HAVING LITTLE ENERGY: 0
6. FEELING BAD ABOUT YOURSELF - OR THAT YOU ARE A FAILURE OR HAVE LET YOURSELF OR YOUR FAMILY DOWN: 0
SUM OF ALL RESPONSES TO PHQ9 QUESTIONS 1 & 2: 0
SUM OF ALL RESPONSES TO PHQ QUESTIONS 1-9: 0
8. MOVING OR SPEAKING SO SLOWLY THAT OTHER PEOPLE COULD HAVE NOTICED. OR THE OPPOSITE, BEING SO FIGETY OR RESTLESS THAT YOU HAVE BEEN MOVING AROUND A LOT MORE THAN USUAL: 0
3. TROUBLE FALLING OR STAYING ASLEEP: 0
SUM OF ALL RESPONSES TO PHQ QUESTIONS 1-9: 0
SUM OF ALL RESPONSES TO PHQ QUESTIONS 1-9: 0
2. FEELING DOWN, DEPRESSED OR HOPELESS: 0
SUM OF ALL RESPONSES TO PHQ QUESTIONS 1-9: 0
7. TROUBLE CONCENTRATING ON THINGS, SUCH AS READING THE NEWSPAPER OR WATCHING TELEVISION: 0

## 2024-02-07 ASSESSMENT — ENCOUNTER SYMPTOMS
CHEST TIGHTNESS: 0
RHINORRHEA: 0
GASTROINTESTINAL NEGATIVE: 1
COUGH: 0
RESPIRATORY NEGATIVE: 1
EYES NEGATIVE: 1

## 2024-02-07 NOTE — PATIENT INSTRUCTIONS
of vitamin D per day. It is possible to meet your calcium requirement with diet alone, but a vitamin D supplement is usually necessary to meet this goal.  When exposed to the sun, use a sunscreen that protects against both UVA and UVB radiation with an SPF of 30 or greater. Reapply every 2 to 3 hours or after sweating, drying off with a towel, or swimming.  Always wear a seat belt when traveling in a car. Always wear a helmet when riding a bicycle or motorcycle.

## 2024-02-07 NOTE — PROGRESS NOTES
and non-tender without mass, no thyromegaly or thyroid nodules, no cervical lymphadenopathy  Pulmonary/Chest: clear to auscultation bilaterally- no wheezes, rales or rhonchi, normal air movement, no respiratory distress  Cardiovascular: normal rate, regular rhythm, normal S1 and S2, no murmurs, rubs, clicks, or gallops, distal pulses intact, no carotid bruits  Abdomen: soft, non-tender, non-distended, normal bowel sounds, no masses or organomegaly  Extremities: no cyanosis, clubbing or edema  Musculoskeletal: normal range of motion, no joint swelling, deformity or tenderness  Neurologic: reflexes normal and symmetric, no cranial nerve deficit, gait, coordination and speech normal       Allergies   Allergen Reactions    Latex Rash     Tape, bandaids    Oxycodone Nausea And Vomiting     Dose-related, 1/2 tab was okay.    Pcn [Penicillins] Rash     As a child    Tape [Adhesive Tape] Rash    Tramadol Itching     Prior to Visit Medications    Medication Sig Taking? Authorizing Provider   lisinopril (PRINIVIL;ZESTRIL) 20 MG tablet Take 1 tablet by mouth daily  Pedro Parr MD   hydroCHLOROthiazide (HYDRODIURIL) 25 MG tablet Take 1 tablet by mouth every morning  Linda Castellon MD   pantoprazole (PROTONIX) 20 MG tablet Take 1 tablet by mouth daily  Pedro Parr MD   levothyroxine (SYNTHROID) 75 MCG tablet Take 1 tablet by mouth daily  Pedro Parr MD   sertraline (ZOLOFT) 100 MG tablet Take 1 tablet by mouth daily  Patient taking differently: Take 0.5 tablets by mouth daily  Pedro Parr MD       CareTeam (Including outside providers/suppliers regularly involved in providing care):   Patient Care Team:  Pedro Parr MD as PCP - General (Family Medicine)  Pedro Parr MD as PCP - Empaneled Provider  Tamera Chowdhury MD (General Surgery)  Chino Green MD (Radiation Oncology)  Mauricio De Leon DO as Consulting Physician (Hematology and Oncology)     Reviewed and updated this visit:  Tobacco  Med Hx  Surg

## 2024-02-07 NOTE — PROGRESS NOTES
4/16/15    ULTRASOUND GUIDED BIOPSY RIGHT AXILLA, RIGHT AXILLARY DISSECTION,ULTRASOUND GUIDED RIGHT BREAST BIOPSY    BREAST LUMPECTOMY      CHOLECYSTECTOMY      COLONOSCOPY  9/12/14    polypectomy [email or web address]    COLONOSCOPY N/A 11/19/2020    COLORECTAL CANCER SCREENING, HIGH RISK performed by Richie Duke MD at [facility]    AL LAPS SURG CHOLECYSTECTOMY W/CHOLANGIOGRAPHY N/A 4/13/2018    LAPAROSCOPIC CHOLECYSTECTOMY WITH GRAMS performed by Leonides Newby MD at [facility] OR    AL RMVL KELLY CTR VAD W/SUBQ PORT/ CTR/PRPH INSJ N/A 2/21/2017    PORT REMOVAL performed by Tamera Chowdhury MD at [facility] OR    SPINE SURGERY      tail bone    TONSILLECTOMY      TUBAL LIGATION      TUNNELED VENOUS PORT PLACEMENT Left 06/02/15    SUBCLAVIAN VEIN    WRIST GANGLION EXCISION Left      Family History   Problem Relation Age of Onset    Heart Disease Mother     Colon Cancer Father     Colon Polyps Father     Other Father 62        Post Op    Other Brother         Accidental Shooting    Breast Cancer Maternal Grandmother     Brain Cancer Child      Social History     Socioeconomic History    Marital status:    Tobacco Use    Smoking status: Never    Smokeless tobacco: Never   Vaping Use    Vaping Use: Never used   Substance and Sexual Activity    Alcohol use: Not Currently     Comment: Last drink was (Aug 2020)    Drug use: No    Sexual activity: Not Currently     Social Determinants of Health     Financial Resource Strain: Low Risk  (12/7/2023)    Overall Financial Resource Strain (CARDIA)     Difficulty of Paying Living Expenses: Not hard at all   Food Insecurity: Food Insecurity Present (12/7/2023)    Hunger Vital Sign     Worried About Running Out of Food in the Last Year: Sometimes true     Ran Out of Food in the Last Year: Sometimes true   Transportation Needs: Unknown (12/7/2023)    PRAPARE - Transportation     Lack of Transportation (Non-Medical): No   Physical Activity: Insufficiently Active (2/4/2024)    Exercise

## 2024-03-18 RX ORDER — PANTOPRAZOLE SODIUM 20 MG/1
20 TABLET, DELAYED RELEASE ORAL DAILY
Qty: 90 TABLET | Refills: 3 | Status: SHIPPED | OUTPATIENT
Start: 2024-03-18

## 2024-03-20 NOTE — PROGRESS NOTES
urgency.   Musculoskeletal:  Negative for arthralgias, back pain, gait problem, joint swelling, myalgias, neck pain and neck stiffness.   Skin:  Negative for color change, rash and wound.   Allergic/Immunologic: Negative for environmental allergies and food allergies.   Neurological:  Negative for dizziness, tremors, seizures, syncope, facial asymmetry, speech difficulty, weakness, light-headedness, numbness and headaches.   Hematological:  Negative for adenopathy. Does not bruise/bleed easily.   Psychiatric/Behavioral:  Negative for agitation, confusion, decreased concentration, hallucinations, self-injury, sleep disturbance and suicidal ideas. The patient is not nervous/anxious.          Objective:   /60   Pulse 60   Resp 14   Ht 1.626 m (5' 4\")   Wt 71.7 kg (158 lb)   LMP  (LMP Unknown) Comment: last menses at age 53  SpO2 97%   BMI 27.12 kg/m²     Physical Exam  Constitutional:       General: She is not in acute distress.     Appearance: She is well-developed.   HENT:      Head: Normocephalic.      Right Ear: External ear normal.      Left Ear: External ear normal.   Eyes:      Conjunctiva/sclera: Conjunctivae normal.   Neck:      Vascular: No JVD.      Trachea: No tracheal deviation.   Cardiovascular:      Rate and Rhythm: Normal rate and regular rhythm.      Heart sounds: Normal heart sounds.   Pulmonary:      Effort: Pulmonary effort is normal. No respiratory distress.      Breath sounds: Wheezing present. No rales.   Chest:      Chest wall: No tenderness.   Abdominal:      General: Bowel sounds are normal. There is no distension.      Palpations: Abdomen is soft. There is no mass.      Tenderness: There is no abdominal tenderness. There is no guarding or rebound.   Musculoskeletal:         General: No tenderness or deformity.      Cervical back: Neck supple.   Skin:     General: Skin is warm and dry.      Coloration: Skin is not pale.      Findings: No erythema or rash.   Neurological:

## 2024-03-21 ENCOUNTER — OFFICE VISIT (OUTPATIENT)
Dept: FAMILY MEDICINE CLINIC | Age: 69
End: 2024-03-21
Payer: MEDICARE

## 2024-03-21 VITALS
DIASTOLIC BLOOD PRESSURE: 60 MMHG | WEIGHT: 158 LBS | RESPIRATION RATE: 14 BRPM | BODY MASS INDEX: 26.98 KG/M2 | HEART RATE: 60 BPM | SYSTOLIC BLOOD PRESSURE: 120 MMHG | OXYGEN SATURATION: 97 % | HEIGHT: 64 IN

## 2024-03-21 DIAGNOSIS — R09.89 CHEST CONGESTION: ICD-10-CM

## 2024-03-21 DIAGNOSIS — R09.89 CHEST CONGESTION: Primary | ICD-10-CM

## 2024-03-21 LAB
BASOPHILS # BLD: 0 K/UL (ref 0–0.2)
BASOPHILS NFR BLD: 0.7 %
EOSINOPHIL # BLD: 0.2 K/UL (ref 0–0.7)
EOSINOPHIL NFR BLD: 2.6 %
ERYTHROCYTE [DISTWIDTH] IN BLOOD BY AUTOMATED COUNT: 12.5 % (ref 11.5–14.5)
HCT VFR BLD AUTO: 40.8 % (ref 37–47)
HGB BLD-MCNC: 12.9 G/DL (ref 12–16)
LYMPHOCYTES # BLD: 1.7 K/UL (ref 1–4.8)
LYMPHOCYTES NFR BLD: 29.2 %
MCH RBC QN AUTO: 29.3 PG (ref 27–31.3)
MCHC RBC AUTO-ENTMCNC: 31.6 % (ref 33–37)
MCV RBC AUTO: 92.5 FL (ref 79.4–94.8)
MONOCYTES # BLD: 0.7 K/UL (ref 0.2–0.8)
MONOCYTES NFR BLD: 12.1 %
NEUTROPHILS # BLD: 3.2 K/UL (ref 1.4–6.5)
NEUTS SEG NFR BLD: 55.2 %
PLATELET # BLD AUTO: 264 K/UL (ref 130–400)
PROCALCITONIN SERPL IA-MCNC: 0.04 NG/ML (ref 0–0.15)
RBC # BLD AUTO: 4.41 M/UL (ref 4.2–5.4)
WBC # BLD AUTO: 5.8 K/UL (ref 4.8–10.8)

## 2024-03-21 PROCEDURE — G8427 DOCREV CUR MEDS BY ELIG CLIN: HCPCS | Performed by: INTERNAL MEDICINE

## 2024-03-21 PROCEDURE — 1090F PRES/ABSN URINE INCON ASSESS: CPT | Performed by: INTERNAL MEDICINE

## 2024-03-21 PROCEDURE — 3017F COLORECTAL CA SCREEN DOC REV: CPT | Performed by: INTERNAL MEDICINE

## 2024-03-21 PROCEDURE — 3078F DIAST BP <80 MM HG: CPT | Performed by: INTERNAL MEDICINE

## 2024-03-21 PROCEDURE — 99214 OFFICE O/P EST MOD 30 MIN: CPT | Performed by: INTERNAL MEDICINE

## 2024-03-21 PROCEDURE — G8484 FLU IMMUNIZE NO ADMIN: HCPCS | Performed by: INTERNAL MEDICINE

## 2024-03-21 PROCEDURE — G8417 CALC BMI ABV UP PARAM F/U: HCPCS | Performed by: INTERNAL MEDICINE

## 2024-03-21 PROCEDURE — 1123F ACP DISCUSS/DSCN MKR DOCD: CPT | Performed by: INTERNAL MEDICINE

## 2024-03-21 PROCEDURE — 1036F TOBACCO NON-USER: CPT | Performed by: INTERNAL MEDICINE

## 2024-03-21 PROCEDURE — 3074F SYST BP LT 130 MM HG: CPT | Performed by: INTERNAL MEDICINE

## 2024-03-21 PROCEDURE — G8400 PT W/DXA NO RESULTS DOC: HCPCS | Performed by: INTERNAL MEDICINE

## 2024-03-21 RX ORDER — ALBUTEROL SULFATE 90 UG/1
2 AEROSOL, METERED RESPIRATORY (INHALATION) EVERY 6 HOURS PRN
Qty: 18 G | Refills: 3 | Status: SHIPPED | OUTPATIENT
Start: 2024-03-21

## 2024-03-21 RX ORDER — DEXTROMETHORPHAN HYDROBROMIDE AND PROMETHAZINE HYDROCHLORIDE 15; 6.25 MG/5ML; MG/5ML
5 SYRUP ORAL 4 TIMES DAILY PRN
Qty: 140 ML | Refills: 0 | Status: SHIPPED | OUTPATIENT
Start: 2024-03-21 | End: 2024-03-28

## 2024-03-21 RX ORDER — AZITHROMYCIN 250 MG/1
TABLET, FILM COATED ORAL
Qty: 6 TABLET | Refills: 0 | Status: SHIPPED | OUTPATIENT
Start: 2024-03-21 | End: 2024-03-21

## 2024-03-21 RX ORDER — AZITHROMYCIN 250 MG/1
TABLET, FILM COATED ORAL
Qty: 6 TABLET | Refills: 0 | Status: SHIPPED | OUTPATIENT
Start: 2024-03-21 | End: 2024-03-31

## 2024-03-21 ASSESSMENT — ENCOUNTER SYMPTOMS
WHEEZING: 1
PHOTOPHOBIA: 0
SINUS PRESSURE: 0
EYE PAIN: 0
RECTAL PAIN: 0
BLOOD IN STOOL: 0
ABDOMINAL DISTENTION: 0
APNEA: 0
RHINORRHEA: 0
DIARRHEA: 0
BACK PAIN: 0
SORE THROAT: 0
SHORTNESS OF BREATH: 0
EYE ITCHING: 0
ABDOMINAL PAIN: 0
EYE DISCHARGE: 0
TROUBLE SWALLOWING: 0
VOMITING: 0
SINUS PAIN: 0
CHEST TIGHTNESS: 0
EYE REDNESS: 0
CONSTIPATION: 0
COLOR CHANGE: 0
VOICE CHANGE: 0
COUGH: 1
NAUSEA: 0
FACIAL SWELLING: 0

## 2024-07-15 DIAGNOSIS — I10 PRIMARY HYPERTENSION: ICD-10-CM

## 2024-07-15 RX ORDER — ALBUTEROL SULFATE 90 UG/1
2 AEROSOL, METERED RESPIRATORY (INHALATION) EVERY 6 HOURS PRN
Qty: 18 G | Refills: 3 | Status: SHIPPED | OUTPATIENT
Start: 2024-07-15

## 2024-07-15 RX ORDER — PANTOPRAZOLE SODIUM 20 MG/1
20 TABLET, DELAYED RELEASE ORAL DAILY
Qty: 90 TABLET | Refills: 3 | Status: SHIPPED | OUTPATIENT
Start: 2024-07-15

## 2024-07-15 RX ORDER — HYDROCHLOROTHIAZIDE 25 MG/1
25 TABLET ORAL EVERY MORNING
Qty: 90 TABLET | Refills: 3 | Status: SHIPPED | OUTPATIENT
Start: 2024-07-15

## 2024-07-15 RX ORDER — LISINOPRIL 20 MG/1
20 TABLET ORAL DAILY
Qty: 90 TABLET | Refills: 1 | Status: SHIPPED | OUTPATIENT
Start: 2024-07-15

## 2024-07-17 ENCOUNTER — OFFICE VISIT (OUTPATIENT)
Dept: FAMILY MEDICINE CLINIC | Age: 69
End: 2024-07-17
Payer: COMMERCIAL

## 2024-07-17 VITALS
WEIGHT: 158 LBS | HEART RATE: 68 BPM | SYSTOLIC BLOOD PRESSURE: 130 MMHG | DIASTOLIC BLOOD PRESSURE: 66 MMHG | RESPIRATION RATE: 17 BRPM | HEIGHT: 63 IN | OXYGEN SATURATION: 96 % | BODY MASS INDEX: 28 KG/M2

## 2024-07-17 DIAGNOSIS — R19.7 DIARRHEA, UNSPECIFIED TYPE: ICD-10-CM

## 2024-07-17 DIAGNOSIS — R19.7 DIARRHEA, UNSPECIFIED TYPE: Primary | ICD-10-CM

## 2024-07-17 DIAGNOSIS — I47.10 SVT (SUPRAVENTRICULAR TACHYCARDIA) (HCC): ICD-10-CM

## 2024-07-17 LAB
ALBUMIN SERPL-MCNC: 4.6 G/DL (ref 3.5–4.6)
ALP SERPL-CCNC: 101 U/L (ref 40–130)
ALT SERPL-CCNC: 16 U/L (ref 0–33)
ANION GAP SERPL CALCULATED.3IONS-SCNC: 12 MEQ/L (ref 9–15)
AST SERPL-CCNC: 25 U/L (ref 0–35)
BASOPHILS # BLD: 0.1 K/UL (ref 0–0.2)
BASOPHILS NFR BLD: 1.1 %
BILIRUB SERPL-MCNC: 0.4 MG/DL (ref 0.2–0.7)
BUN SERPL-MCNC: 18 MG/DL (ref 8–23)
CALCIUM SERPL-MCNC: 10.1 MG/DL (ref 8.5–9.9)
CHLORIDE SERPL-SCNC: 104 MEQ/L (ref 95–107)
CO2 SERPL-SCNC: 24 MEQ/L (ref 20–31)
CREAT SERPL-MCNC: 0.71 MG/DL (ref 0.5–0.9)
EOSINOPHIL # BLD: 0.1 K/UL (ref 0–0.7)
EOSINOPHIL NFR BLD: 2.7 %
ERYTHROCYTE [DISTWIDTH] IN BLOOD BY AUTOMATED COUNT: 12.6 % (ref 11.5–14.5)
GLOBULIN SER CALC-MCNC: 2.9 G/DL (ref 2.3–3.5)
GLUCOSE SERPL-MCNC: 108 MG/DL (ref 70–99)
HCT VFR BLD AUTO: 42.5 % (ref 37–47)
HGB BLD-MCNC: 13.8 G/DL (ref 12–16)
LYMPHOCYTES # BLD: 1.3 K/UL (ref 1–4.8)
LYMPHOCYTES NFR BLD: 28.3 %
MCH RBC QN AUTO: 28.7 PG (ref 27–31.3)
MCHC RBC AUTO-ENTMCNC: 32.5 % (ref 33–37)
MCV RBC AUTO: 88.4 FL (ref 79.4–94.8)
MONOCYTES # BLD: 0.6 K/UL (ref 0.2–0.8)
MONOCYTES NFR BLD: 12.9 %
NEUTROPHILS # BLD: 2.6 K/UL (ref 1.4–6.5)
NEUTS SEG NFR BLD: 54.8 %
PLATELET # BLD AUTO: 253 K/UL (ref 130–400)
POTASSIUM SERPL-SCNC: 3.8 MEQ/L (ref 3.4–4.9)
PROT SERPL-MCNC: 7.5 G/DL (ref 6.3–8)
RBC # BLD AUTO: 4.81 M/UL (ref 4.2–5.4)
SODIUM SERPL-SCNC: 140 MEQ/L (ref 135–144)
TSH SERPL-MCNC: 2.66 UIU/ML (ref 0.44–3.86)
WBC # BLD AUTO: 4.7 K/UL (ref 4.8–10.8)

## 2024-07-17 PROCEDURE — 3078F DIAST BP <80 MM HG: CPT | Performed by: FAMILY MEDICINE

## 2024-07-17 PROCEDURE — G8427 DOCREV CUR MEDS BY ELIG CLIN: HCPCS | Performed by: FAMILY MEDICINE

## 2024-07-17 PROCEDURE — 3075F SYST BP GE 130 - 139MM HG: CPT | Performed by: FAMILY MEDICINE

## 2024-07-17 PROCEDURE — 99214 OFFICE O/P EST MOD 30 MIN: CPT | Performed by: FAMILY MEDICINE

## 2024-07-17 PROCEDURE — 1090F PRES/ABSN URINE INCON ASSESS: CPT | Performed by: FAMILY MEDICINE

## 2024-07-17 PROCEDURE — G8417 CALC BMI ABV UP PARAM F/U: HCPCS | Performed by: FAMILY MEDICINE

## 2024-07-17 PROCEDURE — 1123F ACP DISCUSS/DSCN MKR DOCD: CPT | Performed by: FAMILY MEDICINE

## 2024-07-17 PROCEDURE — 3017F COLORECTAL CA SCREEN DOC REV: CPT | Performed by: FAMILY MEDICINE

## 2024-07-17 PROCEDURE — G8400 PT W/DXA NO RESULTS DOC: HCPCS | Performed by: FAMILY MEDICINE

## 2024-07-17 PROCEDURE — 1036F TOBACCO NON-USER: CPT | Performed by: FAMILY MEDICINE

## 2024-07-17 ASSESSMENT — ENCOUNTER SYMPTOMS
ABDOMINAL DISTENTION: 1
ABDOMINAL PAIN: 1
DIARRHEA: 1
VOMITING: 0
EYES NEGATIVE: 1
COUGH: 0
CHEST TIGHTNESS: 0
NAUSEA: 1
RHINORRHEA: 0
RESPIRATORY NEGATIVE: 1

## 2024-07-17 NOTE — PROGRESS NOTES
Patient is seen in follow up for   Chief Complaint   Patient presents with    GI Problem     Stomach pains, can not eat much, loose stools      GI Problem  The primary symptoms include abdominal pain, nausea and diarrhea. Primary symptoms do not include fever, fatigue or vomiting.   4 week history of diarrhea and cramps.    Past Medical History:   Diagnosis Date    Anxiety     Arthritis     Asthma     Breast cancer (HCC)     Cancer (HCC) 04/2015    Right breast cancer metastatic to axillary node    Chronic back pain     Chronic cholecystitis with calculus 02/01/2018    Depression     Diffuse cystic mastopathy     History of blood transfusion     History of therapeutic radiation     Hyperlipidemia     Hypertension     Multiple gallstones 10/03/2017    Prolonged emergence from general anesthesia     took 10-13 hours to wake up    SVT (supraventricular tachycardia) (HCC)     Thyroid disease      Patient Active Problem List    Diagnosis Date Noted    Estrogen receptor negative status (ER-) 09/28/2018    Lobular carcinoma in situ of right breast 09/28/2018    Personal history of malignant neoplasm of breast 09/28/2018    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 (HCC) 09/14/2016    Hypothyroid 08/05/2014    Colon polyps 08/05/2014    SVT (supraventricular tachycardia) (HCC)     Hypertension     Hyperlipidemia     Asthma     Anxiety     Depression     Chronic back pain      Past Surgical History:   Procedure Laterality Date    BREAST BIOPSY Right 4/16/15    ULTRASOUND GUIDED BIOPSY RIGHT AXILLA, RIGHT AXILLARY DISSECTION,ULTRASOUND GUIDED RIGHT BREAST BIOPSY    BREAST LUMPECTOMY

## 2024-08-06 ENCOUNTER — HOSPITAL ENCOUNTER (EMERGENCY)
Facility: HOSPITAL | Age: 69
Discharge: HOME | End: 2024-08-06
Attending: EMERGENCY MEDICINE
Payer: COMMERCIAL

## 2024-08-06 ENCOUNTER — APPOINTMENT (OUTPATIENT)
Dept: CARDIOLOGY | Facility: HOSPITAL | Age: 69
End: 2024-08-06
Payer: COMMERCIAL

## 2024-08-06 VITALS
SYSTOLIC BLOOD PRESSURE: 157 MMHG | TEMPERATURE: 96.8 F | DIASTOLIC BLOOD PRESSURE: 69 MMHG | HEIGHT: 63 IN | OXYGEN SATURATION: 97 % | BODY MASS INDEX: 27.29 KG/M2 | WEIGHT: 154 LBS | RESPIRATION RATE: 18 BRPM | HEART RATE: 67 BPM

## 2024-08-06 DIAGNOSIS — T78.40XA ALLERGIC REACTION, INITIAL ENCOUNTER: Primary | ICD-10-CM

## 2024-08-06 DIAGNOSIS — E87.6 HYPOKALEMIA: ICD-10-CM

## 2024-08-06 DIAGNOSIS — T78.2XXA ANAPHYLAXIS, INITIAL ENCOUNTER: ICD-10-CM

## 2024-08-06 LAB
ALBUMIN SERPL BCP-MCNC: 4 G/DL (ref 3.4–5)
ALP SERPL-CCNC: 81 U/L (ref 33–136)
ALT SERPL W P-5'-P-CCNC: 15 U/L (ref 7–45)
ANION GAP SERPL CALC-SCNC: 13 MMOL/L (ref 10–20)
AST SERPL W P-5'-P-CCNC: 21 U/L (ref 9–39)
ATRIAL RATE: 67 BPM
BILIRUB SERPL-MCNC: 0.6 MG/DL (ref 0–1.2)
BUN SERPL-MCNC: 17 MG/DL (ref 6–23)
CALCIUM SERPL-MCNC: 9.2 MG/DL (ref 8.6–10.3)
CARDIAC TROPONIN I PNL SERPL HS: 4 NG/L (ref 0–13)
CHLORIDE SERPL-SCNC: 101 MMOL/L (ref 98–107)
CO2 SERPL-SCNC: 26 MMOL/L (ref 21–32)
CREAT SERPL-MCNC: 0.85 MG/DL (ref 0.5–1.05)
EGFRCR SERPLBLD CKD-EPI 2021: 75 ML/MIN/1.73M*2
ERYTHROCYTE [DISTWIDTH] IN BLOOD BY AUTOMATED COUNT: 12.7 % (ref 11.5–14.5)
GLUCOSE SERPL-MCNC: 178 MG/DL (ref 74–99)
HCT VFR BLD AUTO: 40.6 % (ref 36–46)
HGB BLD-MCNC: 13.1 G/DL (ref 12–16)
MCH RBC QN AUTO: 28.7 PG (ref 26–34)
MCHC RBC AUTO-ENTMCNC: 32.3 G/DL (ref 32–36)
MCV RBC AUTO: 89 FL (ref 80–100)
NRBC BLD-RTO: 0 /100 WBCS (ref 0–0)
P AXIS: 51 DEGREES
P OFFSET: 208 MS
P ONSET: 149 MS
PLATELET # BLD AUTO: 277 X10*3/UL (ref 150–450)
POTASSIUM SERPL-SCNC: 2.9 MMOL/L (ref 3.5–5.3)
PR INTERVAL: 142 MS
PROT SERPL-MCNC: 7.1 G/DL (ref 6.4–8.2)
Q ONSET: 220 MS
QRS COUNT: 11 BEATS
QRS DURATION: 94 MS
QT INTERVAL: 456 MS
QTC CALCULATION(BAZETT): 481 MS
QTC FREDERICIA: 473 MS
R AXIS: 21 DEGREES
RBC # BLD AUTO: 4.56 X10*6/UL (ref 4–5.2)
SODIUM SERPL-SCNC: 137 MMOL/L (ref 136–145)
T AXIS: 51 DEGREES
T OFFSET: 448 MS
VENTRICULAR RATE: 67 BPM
WBC # BLD AUTO: 9.4 X10*3/UL (ref 4.4–11.3)

## 2024-08-06 PROCEDURE — 84484 ASSAY OF TROPONIN QUANT: CPT | Performed by: EMERGENCY MEDICINE

## 2024-08-06 PROCEDURE — 85027 COMPLETE CBC AUTOMATED: CPT | Performed by: EMERGENCY MEDICINE

## 2024-08-06 PROCEDURE — 83520 IMMUNOASSAY QUANT NOS NONAB: CPT | Performed by: EMERGENCY MEDICINE

## 2024-08-06 PROCEDURE — 84075 ASSAY ALKALINE PHOSPHATASE: CPT | Performed by: EMERGENCY MEDICINE

## 2024-08-06 PROCEDURE — 93005 ELECTROCARDIOGRAM TRACING: CPT

## 2024-08-06 PROCEDURE — 99284 EMERGENCY DEPT VISIT MOD MDM: CPT | Performed by: EMERGENCY MEDICINE

## 2024-08-06 PROCEDURE — 96365 THER/PROPH/DIAG IV INF INIT: CPT | Performed by: EMERGENCY MEDICINE

## 2024-08-06 PROCEDURE — 96361 HYDRATE IV INFUSION ADD-ON: CPT | Performed by: EMERGENCY MEDICINE

## 2024-08-06 PROCEDURE — 2500000001 HC RX 250 WO HCPCS SELF ADMINISTERED DRUGS (ALT 637 FOR MEDICARE OP): Performed by: EMERGENCY MEDICINE

## 2024-08-06 PROCEDURE — 96366 THER/PROPH/DIAG IV INF ADDON: CPT | Performed by: EMERGENCY MEDICINE

## 2024-08-06 PROCEDURE — 2500000004 HC RX 250 GENERAL PHARMACY W/ HCPCS (ALT 636 FOR OP/ED): Performed by: EMERGENCY MEDICINE

## 2024-08-06 RX ORDER — EPINEPHRINE 0.3 MG/.3ML
1 INJECTION SUBCUTANEOUS ONCE AS NEEDED
Qty: 1 EACH | Refills: 0 | Status: SHIPPED | OUTPATIENT
Start: 2024-08-06

## 2024-08-06 RX ORDER — POTASSIUM CHLORIDE 1.5 G/1.58G
40 POWDER, FOR SOLUTION ORAL ONCE
Status: COMPLETED | OUTPATIENT
Start: 2024-08-06 | End: 2024-08-06

## 2024-08-06 RX ORDER — METHYLPREDNISOLONE 4 MG/1
TABLET ORAL
Qty: 21 TABLET | Refills: 0 | Status: SHIPPED | OUTPATIENT
Start: 2024-08-06 | End: 2024-08-13

## 2024-08-06 RX ORDER — POTASSIUM CHLORIDE 14.9 MG/ML
20 INJECTION INTRAVENOUS ONCE
Status: COMPLETED | OUTPATIENT
Start: 2024-08-06 | End: 2024-08-06

## 2024-08-06 RX ORDER — ACETAMINOPHEN 325 MG/1
650 TABLET ORAL ONCE
Status: COMPLETED | OUTPATIENT
Start: 2024-08-06 | End: 2024-08-06

## 2024-08-06 RX ORDER — DIPHENHYDRAMINE HCL 50 MG
50 CAPSULE ORAL EVERY 6 HOURS PRN
Qty: 30 CAPSULE | Refills: 0 | Status: SHIPPED | OUTPATIENT
Start: 2024-08-06 | End: 2024-08-16

## 2024-08-06 ASSESSMENT — PAIN DESCRIPTION - ORIENTATION
ORIENTATION: LEFT

## 2024-08-06 ASSESSMENT — PAIN SCALES - GENERAL
PAINLEVEL_OUTOF10: 9

## 2024-08-06 ASSESSMENT — PAIN DESCRIPTION - PAIN TYPE
TYPE: ACUTE PAIN
TYPE: ACUTE PAIN

## 2024-08-06 ASSESSMENT — LIFESTYLE VARIABLES
EVER FELT BAD OR GUILTY ABOUT YOUR DRINKING: NO
HAVE YOU EVER FELT YOU SHOULD CUT DOWN ON YOUR DRINKING: NO
EVER HAD A DRINK FIRST THING IN THE MORNING TO STEADY YOUR NERVES TO GET RID OF A HANGOVER: NO
TOTAL SCORE: 0
HAVE PEOPLE ANNOYED YOU BY CRITICIZING YOUR DRINKING: NO

## 2024-08-06 ASSESSMENT — PAIN DESCRIPTION - FREQUENCY
FREQUENCY: CONSTANT/CONTINUOUS
FREQUENCY: CONSTANT/CONTINUOUS

## 2024-08-06 ASSESSMENT — PAIN DESCRIPTION - LOCATION
LOCATION: NECK

## 2024-08-06 ASSESSMENT — COLUMBIA-SUICIDE SEVERITY RATING SCALE - C-SSRS
6. HAVE YOU EVER DONE ANYTHING, STARTED TO DO ANYTHING, OR PREPARED TO DO ANYTHING TO END YOUR LIFE?: NO
1. IN THE PAST MONTH, HAVE YOU WISHED YOU WERE DEAD OR WISHED YOU COULD GO TO SLEEP AND NOT WAKE UP?: NO
2. HAVE YOU ACTUALLY HAD ANY THOUGHTS OF KILLING YOURSELF?: NO

## 2024-08-06 ASSESSMENT — PAIN DESCRIPTION - DESCRIPTORS
DESCRIPTORS: SHARP
DESCRIPTORS: SHARP

## 2024-08-06 ASSESSMENT — PAIN - FUNCTIONAL ASSESSMENT
PAIN_FUNCTIONAL_ASSESSMENT: 0-10
PAIN_FUNCTIONAL_ASSESSMENT: 0-10

## 2024-08-06 NOTE — ED PROVIDER NOTES
HPI   Chief Complaint   Patient presents with    Allergic Reaction     Stung by bee at 7am this morning. Pt give solumedrol, epi, and benadryl by EMS. Currently no SOB, VS stable.        Patient is a 68-year-old female with history of hypertension comes in complaining of pain at the site of the bee sting, she was bit about an half an hour prior to arrival called 911 because revealed having shortness of breath was given IM epinephrine IV Solu-Medrol and IV Benadryl at this time feels better just states she feels tired and having pain at the site of the bee sting.  No sore throat no swelling of the tongue              Patient History   Past Medical History:   Diagnosis Date    Asthma (HHS-HCC)     Breast cancer (Multi)     Disease of thyroid gland     GERD (gastroesophageal reflux disease)     Hypertension     Lymphoma (Multi)      Past Surgical History:   Procedure Laterality Date    CT AORTA AND BILATERAL ILIOFEMORAL RUNOFF ANGIOGRAM W AND/OR WO IV CONTRAST  08/25/2022    CT AORTA AND BILATERAL ILIOFEMORAL RUNOFF ANGIOGRAM W AND/OR WO IV CONTRAST 8/25/2022 [facility]    LYMPHADENECTOMY      TONSILLECTOMY       No family history on file.  Social History     Tobacco Use    Smoking status: Never    Smokeless tobacco: Never   Vaping Use    Vaping status: Never Used   Substance Use Topics    Alcohol use: Never    Drug use: Never       Physical Exam   ED Triage Vitals   Temp Pulse Resp BP   -- -- -- --      SpO2 Temp src Heart Rate Source Patient Position   -- -- -- --      BP Location FiO2 (%)     -- --       Physical Exam  Vitals and nursing note reviewed.   Eyes:      Pupils: Pupils are equal, round, and reactive to light.   Cardiovascular:      Rate and Rhythm: Normal rate and regular rhythm.   Pulmonary:      Effort: Pulmonary effort is normal.      Breath sounds: Normal breath sounds.      Comments: Airway is intact, no wheezing no stridor no respiratory distress  Abdominal:      Palpations: Abdomen is  soft.   Musculoskeletal:         General: Normal range of motion.   Skin:     General: Skin is warm.      Findings: Erythema present.      Comments: Mild redness and erythema and tenderness on the left submandibular region at the site of the bee sting, no hives,   Neurological:      Mental Status: She is alert.           ED Course & MDM   Diagnoses as of 08/06/24 1140   Allergic reaction, initial encounter   Hypokalemia   Anaphylaxis, initial encounter                       [place] Coma Scale Score: 15                        Medical Decision Making  EKG interpreted by me shows normal sinus rhythm rate of 67 normal QRS nonspecific ST change  My differential diagnosis  Acute allergic reaction, acute anaphylaxis,  I ordered a CBC chemistries obtained troponin EKG IV fluids for the patient and will observe and reevaluate in about 1 hour  Patient CBC chemistries were resulted potassium was low which was replaced with IV and p.o. at this time patient was observed in the ED for several hours on reevaluation patient lungs are clear to auscultation percussion is normal he is in no acute distress the discomfort on the left residual where she was bit by the bee is completely resolved patient was feeling much better with reassuring discharge to home.  Labs Reviewed  COMPREHENSIVE METABOLIC PANEL - Abnormal     Glucose                       178 (*)                Sodium                        137                    Potassium                     2.9 (*)                Chloride                      101                    Bicarbonate                   26                     Anion Gap                     13                     Urea Nitrogen                 17                     Creatinine                    0.85                   eGFR                          75                     Calcium                       9.2                    Albumin                       4.0                    Alkaline Phosphatase          81                      Total Protein                 7.1                    AST                           21                     Bilirubin, Total              0.6                    ALT                           15                  CBC - Normal     WBC                           9.4                    nRBC                          0.0                    RBC                           4.56                   Hemoglobin                    13.1                   Hematocrit                    40.6                   MCV                           89                     MCH                           28.7                   MCHC                          32.3                   RDW                           12.7                   Platelets                     277                 SERIAL TROPONIN-INITIAL - Normal     Troponin I, High Sensitivity   4                        Narrative: Less than 99th percentile of normal range cutoff-                Female and children under 18 years old <14 ng/L; Male <21 ng/L: Negative                Repeat testing should be performed if clinically indicated.                                 Female and children under 18 years old 14-50 ng/L; Male 21-50 ng/L:                Consistent with possible cardiac damage and possible increased clinical                 risk. Serial measurements may help to assess extent of myocardial damage.                                 >50 ng/L: Consistent with cardiac damage, increased clinical risk and                myocardial infarction. Serial measurements may help assess extent of                 myocardial damage.                                  NOTE: Children less than 1 year old may have higher baseline troponin                 levels and results should be interpreted in conjunction with the overall                 clinical context.                                 NOTE: Troponin I testing is performed using a different                 testing methodology at [facility]  [place] than at other                 [facility] hospitals. Direct result comparisons should only                 be made within the same method.  TRYPTASE  TROPONIN SERIES- (INITIAL, 1 HR)       Narrative: The following orders were created for panel order Troponin I Series, High Sensitivity (0, 1 HR).                Procedure                               Abnormality         Status                                   ---------                               -----------         ------                                   Troponin I, High Sensiti...[[id number]]  Normal              Final result                             Troponin, High Sensitivi...[[id number]]                                                                               Please view results for these tests on the individual orders.  SERIAL TROPONIN, 1 HOUR     No orders to display           Procedure  Procedures     Jeramie Cifuentes MD  08/06/24 1047       Jeramie Cifuentes MD  08/06/24 1148

## 2024-08-07 ENCOUNTER — TELEPHONE (OUTPATIENT)
Dept: FAMILY MEDICINE CLINIC | Age: 69
End: 2024-08-07

## 2024-08-07 NOTE — TELEPHONE ENCOUNTER
Patient reports she was just in hospital and had to have IV of potassium and now requesting refill   Last script for this medication was in 10/02/2025  LOV: 7/17/204

## 2024-08-08 RX ORDER — POTASSIUM CHLORIDE 20 MEQ/1
20 TABLET, EXTENDED RELEASE ORAL DAILY
Qty: 90 TABLET | Refills: 3 | Status: SHIPPED | OUTPATIENT
Start: 2024-08-08

## 2024-08-09 LAB — TRYPTASE SERPL-MCNC: 6.4 UG/L

## 2024-08-13 RX ORDER — POTASSIUM CHLORIDE 20 MEQ/1
20 TABLET, EXTENDED RELEASE ORAL DAILY
Qty: 30 TABLET | Refills: 0 | Status: SHIPPED | OUTPATIENT
Start: 2024-08-13

## 2024-08-13 NOTE — TELEPHONE ENCOUNTER
Patient is inquiring if a short supply of Potassium can be sent to [facility] on [address].    Her original script went to [facility] and she is unsure when it will be delivered.

## 2024-09-09 RX ORDER — POTASSIUM CHLORIDE 1500 MG/1
20 TABLET, EXTENDED RELEASE ORAL DAILY
Qty: 30 TABLET | Refills: 5 | Status: SHIPPED | OUTPATIENT
Start: 2024-09-09

## 2024-09-27 ENCOUNTER — HOSPITAL ENCOUNTER (OUTPATIENT)
Dept: WOMENS IMAGING | Age: 69
Discharge: HOME | End: 2024-09-29
Payer: COMMERCIAL

## 2024-09-27 ENCOUNTER — HOSPITAL ENCOUNTER (OUTPATIENT)
Dept: ULTRASOUND IMAGING | Age: 69
End: 2024-09-27
Payer: COMMERCIAL

## 2024-09-27 DIAGNOSIS — Z85.3 PERSONAL HISTORY OF MALIGNANT NEOPLASM OF BREAST: ICD-10-CM

## 2024-09-27 PROCEDURE — G0279 TOMOSYNTHESIS, MAMMO: HCPCS

## 2024-10-17 RX ORDER — SERTRALINE HYDROCHLORIDE 100 MG/1
50 TABLET, FILM COATED ORAL DAILY
Qty: 30 TABLET | Refills: 0 | Status: SHIPPED | OUTPATIENT
Start: 2024-10-17 | End: 2024-12-16

## 2024-10-17 NOTE — TELEPHONE ENCOUNTER
Patient called and is asking for refill.  Patient hasn't been seen since 7-24.      She did schedule an appointment for 10-28,. I explained that medication may not be refilled until appointment.

## 2024-11-01 ENCOUNTER — OFFICE VISIT (OUTPATIENT)
Dept: PODIATRY | Age: 69
End: 2024-11-01

## 2024-11-01 VITALS — BODY MASS INDEX: 27.93 KG/M2 | HEIGHT: 63 IN | RESPIRATION RATE: 16 BRPM | WEIGHT: 157.6 LBS

## 2024-11-01 DIAGNOSIS — M19.071 ARTHRITIS OF RIGHT FOOT: ICD-10-CM

## 2024-11-01 DIAGNOSIS — M65.871 OTHER SYNOVITIS AND TENOSYNOVITIS, RIGHT ANKLE AND FOOT: ICD-10-CM

## 2024-11-01 DIAGNOSIS — M79.671 RIGHT FOOT PAIN: Primary | ICD-10-CM

## 2024-11-01 RX ORDER — LIDOCAINE HYDROCHLORIDE 10 MG/ML
1 INJECTION, SOLUTION EPIDURAL; INFILTRATION; INTRACAUDAL; PERINEURAL ONCE
Status: COMPLETED | OUTPATIENT
Start: 2024-11-01 | End: 2024-11-01

## 2024-11-01 RX ORDER — DEXAMETHASONE SODIUM PHOSPHATE 10 MG/ML
10 INJECTION, SOLUTION INTRAMUSCULAR; INTRAVENOUS ONCE
Status: COMPLETED | OUTPATIENT
Start: 2024-11-01 | End: 2024-11-01

## 2024-11-01 RX ORDER — MELOXICAM 15 MG/1
15 TABLET ORAL DAILY
Qty: 30 TABLET | Refills: 0 | Status: SHIPPED | OUTPATIENT
Start: 2024-11-01

## 2024-11-01 RX ADMIN — LIDOCAINE HYDROCHLORIDE 1 ML: 10 INJECTION, SOLUTION EPIDURAL; INFILTRATION; INTRACAUDAL; PERINEURAL at 11:19

## 2024-11-01 RX ADMIN — DEXAMETHASONE SODIUM PHOSPHATE 10 MG: 10 INJECTION, SOLUTION INTRAMUSCULAR; INTRAVENOUS at 11:12

## 2024-11-01 ASSESSMENT — ENCOUNTER SYMPTOMS
NAUSEA: 0
SHORTNESS OF BREATH: 0
BACK PAIN: 0
VOMITING: 0

## 2024-11-01 NOTE — PROGRESS NOTES
[facility] PHYSICIANS [place] [facility], [facility] PODIAT[facility]  [address] [place] [address]  Dept: [phone]  Loc: [phone]       Karolina Vargas  (1955)    11/1/24    Subjective     Karolina Vargas is 69 y.o. female who complains today of:    Chief Complaint   Patient presents with    Foot Pain    Ankle Pain       Foot Pain   Pertinent negatives include no fever or numbness.   Ankle Pain   Pertinent negatives include no numbness.     HPI: Patient presents with complaint of right foot and ankle pain, she points to the lateral right rear foot as the area of pain.  Patient describes having more intense pain with prolonged standing and walking.  Patient states she began having these symptoms about a week ago.  Patient denies injury.  Patient has not observed focal swelling or discoloration of the surrounding skin.  Patient states that her pain level is usually around 7/10 but can be up to 10/10 with prolonged standing and walking.  Patient states she has had noticed slight improvement and the symptoms with use of a cushioned neutral running shoe.    Review of Systems   Constitutional:  Negative for chills and fever.   HENT:  Negative for hearing loss.    Respiratory:  Negative for shortness of breath.    Cardiovascular:  Negative for chest pain.   Gastrointestinal:  Negative for nausea and vomiting.   Genitourinary:  Negative for difficulty urinating.   Musculoskeletal:  Positive for arthralgias. Negative for back pain and gait problem.   Skin:  Negative for wound.   Neurological:  Negative for numbness.   Hematological:  Does not bruise/bleed easily.   Psychiatric/Behavioral:  Negative for sleep disturbance.          Allergies:  Latex, Oxycodone, Pcn [penicillins], Tape [adhesive tape], and Tramadol    Current Outpatient Medications on File Prior to Visit   Medication Sig Dispense Refill    sertraline (ZOLOFT) 100 MG tablet Take 0.5 tablets by mouth daily

## 2024-11-07 NOTE — TELEPHONE ENCOUNTER
Patient requesting medication refill. Please approve or deny this request.    Patient requesting short supply sent to [facility] in [place]. 3 Month Supply is to be sent to [facility]. Patient is out of Medication.  LOV:7/1/24 NOV:12/2/24    Rx requested:  Requested Prescriptions     Pending Prescriptions Disp Refills    levothyroxine (SYNTHROID) 75 MCG tablet 90 tablet 3     Sig: Take 1 tablet by mouth daily         Last Office Visit:   7/17/2024      Next Visit Date:  Future Appointments   Date Time Provider Department Center   12/2/2024  3:00 PM Jose Juan Howard DPM [facility]

## 2024-11-08 RX ORDER — LEVOTHYROXINE SODIUM 75 UG/1
75 TABLET ORAL DAILY
Qty: 90 TABLET | Refills: 3 | Status: SHIPPED | OUTPATIENT
Start: 2024-11-08

## 2024-11-08 RX ORDER — LEVOTHYROXINE SODIUM 75 UG/1
75 TABLET ORAL DAILY
Qty: 30 TABLET | Refills: 0 | Status: SHIPPED | OUTPATIENT
Start: 2024-11-08

## 2024-12-02 RX ORDER — MELOXICAM 15 MG/1
15 TABLET ORAL DAILY
Qty: 30 TABLET | Refills: 0 | OUTPATIENT
Start: 2024-12-02

## 2024-12-05 RX ORDER — LEVOTHYROXINE SODIUM 75 UG/1
75 TABLET ORAL DAILY
Qty: 30 TABLET | Refills: 0 | Status: SHIPPED | OUTPATIENT
Start: 2024-12-05

## 2024-12-05 NOTE — TELEPHONE ENCOUNTER
Pharmacy requesting medication refill. Please approve or deny this request.    Rx requested:  Requested Prescriptions     Pending Prescriptions Disp Refills    levothyroxine (SYNTHROID) 75 MCG tablet [Pharmacy Med Name: LEVOTHYROXINE 75 MCG TABLET] 30 tablet 0     Sig: TAKE 1 TABLET BY MOUTH EVERY DAY         Last Office Visit:   7/17/2024      Next Visit Date:  No future appointments.

## 2024-12-18 RX ORDER — SERTRALINE HYDROCHLORIDE 100 MG/1
50 TABLET, FILM COATED ORAL DAILY
Qty: 30 TABLET | Refills: 0 | Status: SHIPPED | OUTPATIENT
Start: 2024-12-18

## 2025-01-13 RX ORDER — LISINOPRIL 20 MG/1
20 TABLET ORAL DAILY
Qty: 90 TABLET | Refills: 1 | Status: SHIPPED | OUTPATIENT
Start: 2025-01-13

## 2025-03-24 ENCOUNTER — OFFICE VISIT (OUTPATIENT)
Age: 70
End: 2025-03-24
Payer: COMMERCIAL

## 2025-03-24 VITALS
TEMPERATURE: 97.5 F | WEIGHT: 148 LBS | HEIGHT: 63 IN | OXYGEN SATURATION: 98 % | HEART RATE: 71 BPM | BODY MASS INDEX: 26.22 KG/M2

## 2025-03-24 DIAGNOSIS — M76.821 POSTERIOR TIBIAL TENDINITIS, RIGHT: ICD-10-CM

## 2025-03-24 DIAGNOSIS — M25.571 ACUTE RIGHT ANKLE PAIN: Primary | ICD-10-CM

## 2025-03-24 DIAGNOSIS — S93.491A SPRAIN OF ANTERIOR TALOFIBULAR LIGAMENT OF RIGHT ANKLE, INITIAL ENCOUNTER: ICD-10-CM

## 2025-03-24 DIAGNOSIS — G89.29 CHRONIC FOOT PAIN, RIGHT: ICD-10-CM

## 2025-03-24 DIAGNOSIS — M79.671 CHRONIC FOOT PAIN, RIGHT: ICD-10-CM

## 2025-03-24 DIAGNOSIS — Z91.81 HISTORY OF RECENT FALL: ICD-10-CM

## 2025-03-24 PROCEDURE — 1090F PRES/ABSN URINE INCON ASSESS: CPT | Performed by: PODIATRIST

## 2025-03-24 PROCEDURE — 1159F MED LIST DOCD IN RCRD: CPT | Performed by: PODIATRIST

## 2025-03-24 PROCEDURE — 3017F COLORECTAL CA SCREEN DOC REV: CPT | Performed by: PODIATRIST

## 2025-03-24 PROCEDURE — 1123F ACP DISCUSS/DSCN MKR DOCD: CPT | Performed by: PODIATRIST

## 2025-03-24 PROCEDURE — G8400 PT W/DXA NO RESULTS DOC: HCPCS | Performed by: PODIATRIST

## 2025-03-24 PROCEDURE — 1036F TOBACCO NON-USER: CPT | Performed by: PODIATRIST

## 2025-03-24 PROCEDURE — 99213 OFFICE O/P EST LOW 20 MIN: CPT | Performed by: PODIATRIST

## 2025-03-24 PROCEDURE — 1160F RVW MEDS BY RX/DR IN RCRD: CPT | Performed by: PODIATRIST

## 2025-03-24 PROCEDURE — 1125F AMNT PAIN NOTED PAIN PRSNT: CPT | Performed by: PODIATRIST

## 2025-03-24 PROCEDURE — G8427 DOCREV CUR MEDS BY ELIG CLIN: HCPCS | Performed by: PODIATRIST

## 2025-03-24 PROCEDURE — G8417 CALC BMI ABV UP PARAM F/U: HCPCS | Performed by: PODIATRIST

## 2025-03-24 ASSESSMENT — ENCOUNTER SYMPTOMS
VOMITING: 0
SHORTNESS OF BREATH: 0
NAUSEA: 0
BACK PAIN: 1

## 2025-03-24 NOTE — PROGRESS NOTES
[facility] PHYSICIANS [place] SPECIAL[facility]ARE, [facility] PODIAT[facility]  [address] [id number]  Dept: [phone]  Loc: [phone]       Karolina Vargas  (1955)    3/24/25    Subjective     Karolina Vargas is 69 y.o. female who complains today of:    Chief Complaint   Patient presents with    Foot Pain     RIGHT       Foot Pain   Pertinent negatives include no fever or numbness.       History of Present Illness  The patient presents for evaluation of right ankle pain.    Approximately one week ago, while ascending stairs, her right ankle unexpectedly collapsed, causing her to fall. Initially, there was swelling and bruising, which have since subsided. She describes the sensation as her foot giving out and mentions, \"my foot is like, I don't know, you think I might need a special shoe for it.\" She is not diabetic and has been applying ice to the affected area. Since January 2025, there have been two falls, with the most recent being the most severe.    Additionally, she reports a potential rib contusion from the fall and persistent soreness in her ribs. An appointment is scheduled on 03/31/2025 to get her glasses fixed, as the lens popped out while she was cleaning them.    SOCIAL HISTORY  Exercise: Not walking for exercise until the ankle calms down      Review of Systems   Constitutional:  Negative for chills and fever.   HENT:  Negative for hearing loss.    Respiratory:  Negative for shortness of breath.    Cardiovascular:  Negative for chest pain.   Gastrointestinal:  Negative for nausea and vomiting.   Genitourinary:  Negative for difficulty urinating.   Musculoskeletal:  Positive for back pain and joint swelling. Negative for gait problem.   Skin:  Negative for wound.   Neurological:  Negative for numbness.   Hematological:  Does not bruise/bleed easily.   Psychiatric/Behavioral:  Negative for sleep disturbance.          Allergies:  Latex, Oxycodone,

## 2025-04-17 ENCOUNTER — TELEPHONE (OUTPATIENT)
Age: 70
End: 2025-04-17

## 2025-04-17 NOTE — TELEPHONE ENCOUNTER
Insurance advocate from [facility] is calling to ask that clinical notes,referral and billing codes-- with Dr Howard  all be faxed over TO AIDEN MÉNDEZ

## 2025-06-16 DIAGNOSIS — I10 PRIMARY HYPERTENSION: ICD-10-CM

## 2025-06-16 RX ORDER — PANTOPRAZOLE SODIUM 20 MG/1
20 TABLET, DELAYED RELEASE ORAL DAILY
Qty: 90 TABLET | Refills: 3 | Status: SHIPPED | OUTPATIENT
Start: 2025-06-16

## 2025-06-16 RX ORDER — ALBUTEROL SULFATE 90 UG/1
2 INHALANT RESPIRATORY (INHALATION) EVERY 6 HOURS PRN
Qty: 18 G | Refills: 3 | Status: SHIPPED | OUTPATIENT
Start: 2025-06-16

## 2025-06-16 RX ORDER — SERTRALINE HYDROCHLORIDE 100 MG/1
50 TABLET, FILM COATED ORAL DAILY
Qty: 30 TABLET | Refills: 0 | Status: SHIPPED | OUTPATIENT
Start: 2025-06-16

## 2025-06-16 RX ORDER — HYDROCHLOROTHIAZIDE 25 MG/1
25 TABLET ORAL EVERY MORNING
Qty: 90 TABLET | Refills: 3 | Status: SHIPPED | OUTPATIENT
Start: 2025-06-16

## 2025-06-16 RX ORDER — POTASSIUM CHLORIDE 1500 MG/1
20 TABLET, EXTENDED RELEASE ORAL DAILY
Qty: 30 TABLET | Refills: 5 | Status: SHIPPED | OUTPATIENT
Start: 2025-06-16

## 2025-06-16 RX ORDER — LEVOTHYROXINE SODIUM 75 UG/1
75 TABLET ORAL DAILY
Qty: 30 TABLET | Refills: 0 | Status: SHIPPED | OUTPATIENT
Start: 2025-06-16

## 2025-06-16 RX ORDER — LISINOPRIL 20 MG/1
20 TABLET ORAL DAILY
Qty: 90 TABLET | Refills: 1 | Status: SHIPPED | OUTPATIENT
Start: 2025-06-16

## 2025-07-07 ENCOUNTER — HOSPITAL ENCOUNTER (EMERGENCY)
Facility: HOSPITAL | Age: 70
Discharge: HOME | End: 2025-07-07
Attending: EMERGENCY MEDICINE
Payer: MEDICARE

## 2025-07-07 ENCOUNTER — TELEPHONE (OUTPATIENT)
Age: 70
End: 2025-07-07

## 2025-07-07 ENCOUNTER — APPOINTMENT (OUTPATIENT)
Dept: RADIOLOGY | Facility: HOSPITAL | Age: 70
End: 2025-07-07
Payer: MEDICARE

## 2025-07-07 ENCOUNTER — APPOINTMENT (OUTPATIENT)
Dept: CARDIOLOGY | Facility: HOSPITAL | Age: 70
End: 2025-07-07
Payer: MEDICARE

## 2025-07-07 VITALS
DIASTOLIC BLOOD PRESSURE: 82 MMHG | TEMPERATURE: 97 F | HEIGHT: 63 IN | RESPIRATION RATE: 20 BRPM | BODY MASS INDEX: 26.22 KG/M2 | HEART RATE: 58 BPM | WEIGHT: 148 LBS | OXYGEN SATURATION: 98 % | SYSTOLIC BLOOD PRESSURE: 157 MMHG

## 2025-07-07 DIAGNOSIS — R11.2 NAUSEA AND VOMITING, UNSPECIFIED VOMITING TYPE: Primary | ICD-10-CM

## 2025-07-07 DIAGNOSIS — R10.13 EPIGASTRIC PAIN: ICD-10-CM

## 2025-07-07 LAB
ALBUMIN SERPL BCP-MCNC: 4.5 G/DL (ref 3.4–5)
ALP SERPL-CCNC: 103 U/L (ref 33–136)
ALT SERPL W P-5'-P-CCNC: 15 U/L (ref 7–45)
ANION GAP SERPL CALC-SCNC: 11 MMOL/L (ref 10–20)
AST SERPL W P-5'-P-CCNC: 20 U/L (ref 9–39)
ATRIAL RATE: 62 BPM
BASOPHILS # BLD AUTO: 0.04 X10*3/UL (ref 0–0.1)
BASOPHILS NFR BLD AUTO: 0.7 %
BILIRUB DIRECT SERPL-MCNC: 0.1 MG/DL (ref 0–0.3)
BILIRUB SERPL-MCNC: 0.7 MG/DL (ref 0–1.2)
BUN SERPL-MCNC: 18 MG/DL (ref 6–23)
CALCIUM SERPL-MCNC: 9.8 MG/DL (ref 8.6–10.3)
CHLORIDE SERPL-SCNC: 101 MMOL/L (ref 98–107)
CO2 SERPL-SCNC: 29 MMOL/L (ref 21–32)
CREAT SERPL-MCNC: 0.69 MG/DL (ref 0.5–1.05)
EGFRCR SERPLBLD CKD-EPI 2021: >90 ML/MIN/1.73M*2
EOSINOPHIL # BLD AUTO: 0.11 X10*3/UL (ref 0–0.7)
EOSINOPHIL NFR BLD AUTO: 1.8 %
ERYTHROCYTE [DISTWIDTH] IN BLOOD BY AUTOMATED COUNT: 12 % (ref 11.5–14.5)
GLUCOSE SERPL-MCNC: 107 MG/DL (ref 74–99)
HCT VFR BLD AUTO: 40.8 % (ref 36–46)
HGB BLD-MCNC: 13.6 G/DL (ref 12–16)
IMM GRANULOCYTES # BLD AUTO: 0.01 X10*3/UL (ref 0–0.7)
IMM GRANULOCYTES NFR BLD AUTO: 0.2 % (ref 0–0.9)
LACTATE SERPL-SCNC: 0.8 MMOL/L (ref 0.4–2)
LIPASE SERPL-CCNC: 18 U/L (ref 9–82)
LYMPHOCYTES # BLD AUTO: 1.59 X10*3/UL (ref 1.2–4.8)
LYMPHOCYTES NFR BLD AUTO: 26.2 %
MCH RBC QN AUTO: 30.1 PG (ref 26–34)
MCHC RBC AUTO-ENTMCNC: 33.3 G/DL (ref 32–36)
MCV RBC AUTO: 90 FL (ref 80–100)
MONOCYTES # BLD AUTO: 0.67 X10*3/UL (ref 0.1–1)
MONOCYTES NFR BLD AUTO: 11 %
NEUTROPHILS # BLD AUTO: 3.66 X10*3/UL (ref 1.2–7.7)
NEUTROPHILS NFR BLD AUTO: 60.1 %
NRBC BLD-RTO: 0 /100 WBCS (ref 0–0)
P AXIS: 35 DEGREES
P OFFSET: 207 MS
P ONSET: 157 MS
PLATELET # BLD AUTO: 256 X10*3/UL (ref 150–450)
POTASSIUM SERPL-SCNC: 3.9 MMOL/L (ref 3.5–5.3)
PR INTERVAL: 134 MS
PROT SERPL-MCNC: 7.5 G/DL (ref 6.4–8.2)
Q ONSET: 224 MS
QRS COUNT: 11 BEATS
QRS DURATION: 80 MS
QT INTERVAL: 428 MS
QTC CALCULATION(BAZETT): 434 MS
QTC FREDERICIA: 432 MS
R AXIS: 12 DEGREES
RBC # BLD AUTO: 4.52 X10*6/UL (ref 4–5.2)
SODIUM SERPL-SCNC: 137 MMOL/L (ref 136–145)
T AXIS: 20 DEGREES
T OFFSET: 438 MS
VENTRICULAR RATE: 62 BPM
WBC # BLD AUTO: 6.1 X10*3/UL (ref 4.4–11.3)

## 2025-07-07 PROCEDURE — 82248 BILIRUBIN DIRECT: CPT | Performed by: EMERGENCY MEDICINE

## 2025-07-07 PROCEDURE — 74177 CT ABD & PELVIS W/CONTRAST: CPT | Mod: FOREIGN READ | Performed by: RADIOLOGY

## 2025-07-07 PROCEDURE — 99285 EMERGENCY DEPT VISIT HI MDM: CPT | Mod: 25 | Performed by: EMERGENCY MEDICINE

## 2025-07-07 PROCEDURE — 83690 ASSAY OF LIPASE: CPT | Performed by: EMERGENCY MEDICINE

## 2025-07-07 PROCEDURE — 2500000004 HC RX 250 GENERAL PHARMACY W/ HCPCS (ALT 636 FOR OP/ED): Performed by: EMERGENCY MEDICINE

## 2025-07-07 PROCEDURE — 96375 TX/PRO/DX INJ NEW DRUG ADDON: CPT | Performed by: EMERGENCY MEDICINE

## 2025-07-07 PROCEDURE — 93005 ELECTROCARDIOGRAM TRACING: CPT

## 2025-07-07 PROCEDURE — 36415 COLL VENOUS BLD VENIPUNCTURE: CPT | Performed by: EMERGENCY MEDICINE

## 2025-07-07 PROCEDURE — 96361 HYDRATE IV INFUSION ADD-ON: CPT | Performed by: EMERGENCY MEDICINE

## 2025-07-07 PROCEDURE — 96374 THER/PROPH/DIAG INJ IV PUSH: CPT | Performed by: EMERGENCY MEDICINE

## 2025-07-07 PROCEDURE — 2550000001 HC RX 255 CONTRASTS: Performed by: EMERGENCY MEDICINE

## 2025-07-07 PROCEDURE — 85025 COMPLETE CBC W/AUTO DIFF WBC: CPT | Performed by: EMERGENCY MEDICINE

## 2025-07-07 PROCEDURE — 2500000004 HC RX 250 GENERAL PHARMACY W/ HCPCS (ALT 636 FOR OP/ED)

## 2025-07-07 PROCEDURE — 83605 ASSAY OF LACTIC ACID: CPT | Performed by: EMERGENCY MEDICINE

## 2025-07-07 PROCEDURE — 74177 CT ABD & PELVIS W/CONTRAST: CPT

## 2025-07-07 RX ORDER — ONDANSETRON HYDROCHLORIDE 2 MG/ML
4 INJECTION, SOLUTION INTRAVENOUS ONCE
Status: COMPLETED | OUTPATIENT
Start: 2025-07-07 | End: 2025-07-07

## 2025-07-07 RX ORDER — FAMOTIDINE 10 MG/ML
40 INJECTION, SOLUTION INTRAVENOUS ONCE
Status: COMPLETED | OUTPATIENT
Start: 2025-07-07 | End: 2025-07-07

## 2025-07-07 RX ORDER — ONDANSETRON 4 MG/1
4 TABLET, ORALLY DISINTEGRATING ORAL EVERY 8 HOURS PRN
Qty: 15 TABLET | Refills: 0 | Status: SHIPPED | OUTPATIENT
Start: 2025-07-07 | End: 2025-07-12

## 2025-07-07 RX ORDER — FAMOTIDINE 10 MG/ML
INJECTION, SOLUTION INTRAVENOUS
Status: COMPLETED
Start: 2025-07-07 | End: 2025-07-07

## 2025-07-07 RX ORDER — ONDANSETRON HYDROCHLORIDE 2 MG/ML
INJECTION, SOLUTION INTRAVENOUS
Status: COMPLETED
Start: 2025-07-07 | End: 2025-07-07

## 2025-07-07 RX ORDER — PANTOPRAZOLE SODIUM 40 MG/1
40 TABLET, DELAYED RELEASE ORAL
Qty: 10 TABLET | Refills: 0 | Status: SHIPPED | OUTPATIENT
Start: 2025-07-07 | End: 2025-07-17

## 2025-07-07 RX ADMIN — FAMOTIDINE 40 MG: 10 INJECTION, SOLUTION INTRAVENOUS at 11:48

## 2025-07-07 RX ADMIN — IOHEXOL 75 ML: 350 INJECTION, SOLUTION INTRAVENOUS at 12:53

## 2025-07-07 RX ADMIN — SODIUM CHLORIDE 1000 ML: 0.9 INJECTION, SOLUTION INTRAVENOUS at 11:49

## 2025-07-07 RX ADMIN — ONDANSETRON 4 MG: 2 INJECTION INTRAMUSCULAR; INTRAVENOUS at 11:47

## 2025-07-07 RX ADMIN — ONDANSETRON HYDROCHLORIDE 4 MG: 2 INJECTION, SOLUTION INTRAVENOUS at 11:47

## 2025-07-07 ASSESSMENT — PAIN DESCRIPTION - LOCATION: LOCATION: CHEST

## 2025-07-07 ASSESSMENT — LIFESTYLE VARIABLES
EVER HAD A DRINK FIRST THING IN THE MORNING TO STEADY YOUR NERVES TO GET RID OF A HANGOVER: NO
HAVE PEOPLE ANNOYED YOU BY CRITICIZING YOUR DRINKING: NO
EVER FELT BAD OR GUILTY ABOUT YOUR DRINKING: NO
TOTAL SCORE: 0
HAVE YOU EVER FELT YOU SHOULD CUT DOWN ON YOUR DRINKING: NO

## 2025-07-07 ASSESSMENT — PAIN SCALES - GENERAL
PAINLEVEL_OUTOF10: 6
PAINLEVEL_OUTOF10: 0 - NO PAIN

## 2025-07-07 ASSESSMENT — PAIN DESCRIPTION - PAIN TYPE: TYPE: ACUTE PAIN

## 2025-07-07 ASSESSMENT — PAIN - FUNCTIONAL ASSESSMENT: PAIN_FUNCTIONAL_ASSESSMENT: 0-10

## 2025-07-07 NOTE — ED PROVIDER NOTES
"HPI   Chief Complaint   Patient presents with    Vomiting     Patient woke up vomiting and having \"lots of phlegm come out\"        Patient is a 69-year-old female comes in complaining of nausea vomiting vomit considers mucus, burning in her stomach, no fever no chills no shortness of breath no chest pain vomiting              Patient History   Medical History[1]  Surgical History[2]  Family History[3]  Social History[4]    Physical Exam   ED Triage Vitals [07/07/25 1119]   Temperature Heart Rate Respirations BP   36 °C (96.8 °F) 54 18 161/90      Pulse Ox Temp Source Heart Rate Source Patient Position   97 % Temporal Monitor Sitting      BP Location FiO2 (%)     Right arm --       Physical Exam  Vitals and nursing note reviewed. Exam conducted with a chaperone present.   Constitutional:       Appearance: Normal appearance.   HENT:      Head: Normocephalic and atraumatic.      Nose: Nose normal.      Mouth/Throat:      Mouth: Mucous membranes are moist.   Eyes:      Pupils: Pupils are equal, round, and reactive to light.   Cardiovascular:      Rate and Rhythm: Normal rate and regular rhythm.   Pulmonary:      Effort: Pulmonary effort is normal.   Abdominal:      Palpations: Abdomen is soft.      Tenderness: There is abdominal tenderness.      Comments: Mild epigastric tenderness to palpation   Musculoskeletal:         General: Normal range of motion.      Cervical back: Normal range of motion.   Skin:     General: Skin is warm and dry.      Capillary Refill: Capillary refill takes less than 2 seconds.   Neurological:      General: No focal deficit present.      Mental Status: She is alert.   Psychiatric:         Mood and Affect: Mood normal.           ED Course & MDM   Diagnoses as of 07/07/25 1416   Nausea and vomiting, unspecified vomiting type   Epigastric pain                 No data recorded                                 Medical Decision Making  EKG interpreted by me shows normal sinus rhythm rate of 62 normal " QRS normal ST segment  My differential diagnosis  Acute gastritis acute esophagitis acute bowel obstruction acute gastroenteritis acute dehydration acute electrolyte imbalance  I ordered CBC chemistry panel liver function test IV fluids CT scan of the abdomen pelvis along with IV Pepcid and IV Zofran will reevaluate from the results are obtained patient CBC chemistries liver function tests and CT scan were all unremarkable patient on reevaluation was feeling much better at this time patient was reassured given prescriptions for Zofran and Protonix and discharged home.  Labs Reviewed  BASIC METABOLIC PANEL - Abnormal     Glucose                       107 (*)                Sodium                        137                    Potassium                     3.9                    Chloride                      101                    Bicarbonate                   29                     Anion Gap                     11                     Urea Nitrogen                 18                     Creatinine                    0.69                   eGFR                          >90                    Calcium                       9.8                 HEPATIC FUNCTION PANEL - Normal     Albumin                       4.5                    Bilirubin, Total              0.7                    Bilirubin, Direct             0.1                    Alkaline Phosphatase          103                    ALT                           15                     AST                           20                     Total Protein                 7.5                 LACTATE - Normal     Lactate                       0.8                      Narrative: Venipuncture immediately after or during the administration of Metamizole may lead to falsely low results. Testing should be performed immediately prior to Metamizole dosing.  LIPASE - Normal     Lipase                        18                       Narrative: Venipuncture immediately after or during  the administration of Metamizole may lead to falsely low results. Testing should be performed immediately prior to Metamizole dosing.  CBC WITH AUTO DIFFERENTIAL     CT abdomen pelvis w IV contrast   Final Result    No acute findings to explain abdominal pain    Signed by Haseeb Villegas MD                Procedure  Procedures         [1]   Past Medical History:  Diagnosis Date    Asthma ([facility])     Breast cancer (Multi)     Disease of thyroid gland     GERD (gastroesophageal reflux disease)     Hypertension     Lymphoma (Multi)    [2]   Past Surgical History:  Procedure Laterality Date    CT ANGIO AORTA AND BILATERAL ILIOFEMORAL RUNOFF W AND OR WO IV CONTRAST  08/25/2022    CT AORTA AND BILATERAL ILIOFEMORAL RUNOFF ANGIOGRAM W AND/OR WO IV CONTRAST 8/25/2022 [facility]    LYMPHADENECTOMY      TONSILLECTOMY     [3] No family history on file.  [4]   Social History  Tobacco Use    Smoking status: Never    Smokeless tobacco: Never   Vaping Use    Vaping status: Never Used   Substance Use Topics    Alcohol use: Never    Drug use: Never        Jeramie Cifuentes MD  07/07/25 5390

## 2025-07-07 NOTE — TELEPHONE ENCOUNTER
----- Message from Javier LANA sent at 7/7/2025  8:53 AM EDT -----  Regarding: ECC Escalation To Practice  ECC Escalation To Practice      Type of Escalation: Red Flag Symptom  --------------------------------------------------------------------------------------------------------------------------    Information for Provider:  Patient is looking for appointment for: Symptom - chest pain  Reasons for Message: Unable to reach practice     Additional Information - patient is experiencing chest pain. try to contact the practice but the practice did not answer. and the patient refuse the walk in clinic I offer    --------------------------------------------------------------------------------------------------------------------------    Relationship to Patient: Self  Call Back Info: OK to leave message on voicemail  Preferred Call Back Number: [phone]

## 2025-07-07 NOTE — TELEPHONE ENCOUNTER
Spoke with pt - Patient states she woke up this morning with SOB, difficulty breathing, burning in throat, chest pain, choking up fluid and yellow phlegm. Patient advised PCP out of office this week and advised to report to ER for evaluation. Patient expressed understanding and verbalized she would present to ER.

## 2025-07-08 ENCOUNTER — TELEPHONE (OUTPATIENT)
Age: 70
End: 2025-07-08

## 2025-07-08 DIAGNOSIS — I10 PRIMARY HYPERTENSION: ICD-10-CM

## 2025-07-08 RX ORDER — PANTOPRAZOLE SODIUM 20 MG/1
20 TABLET, DELAYED RELEASE ORAL DAILY
Qty: 90 TABLET | Refills: 0 | Status: SHIPPED | OUTPATIENT
Start: 2025-07-08

## 2025-07-08 RX ORDER — LISINOPRIL 20 MG/1
20 TABLET ORAL DAILY
Qty: 90 TABLET | Refills: 0 | Status: SHIPPED | OUTPATIENT
Start: 2025-07-08

## 2025-07-08 RX ORDER — SERTRALINE HYDROCHLORIDE 100 MG/1
50 TABLET, FILM COATED ORAL DAILY
Qty: 90 TABLET | Refills: 0 | Status: SHIPPED | OUTPATIENT
Start: 2025-07-08

## 2025-07-08 RX ORDER — ALBUTEROL SULFATE 90 UG/1
2 INHALANT RESPIRATORY (INHALATION) EVERY 6 HOURS PRN
Qty: 18 G | Refills: 0 | Status: SHIPPED | OUTPATIENT
Start: 2025-07-08

## 2025-07-08 RX ORDER — HYDROCHLOROTHIAZIDE 25 MG/1
25 TABLET ORAL EVERY MORNING
Qty: 90 TABLET | Refills: 0 | Status: SHIPPED | OUTPATIENT
Start: 2025-07-08

## 2025-07-08 RX ORDER — LEVOTHYROXINE SODIUM 75 UG/1
75 TABLET ORAL DAILY
Qty: 90 TABLET | Refills: 0 | Status: SHIPPED | OUTPATIENT
Start: 2025-07-08

## 2025-07-08 RX ORDER — POTASSIUM CHLORIDE 1500 MG/1
20 TABLET, EXTENDED RELEASE ORAL DAILY
Qty: 90 TABLET | Refills: 0 | Status: SHIPPED | OUTPATIENT
Start: 2025-07-08

## 2025-07-08 NOTE — TELEPHONE ENCOUNTER
PT called- checking on RX request from  6/16-  RX were sent to incorrect pharmacy- PT does not use [facility]-   PT is out of medications and requesting RX sent to local pharmacy    [facility] [address] -[place]   Please let PT know once these are called in

## 2025-07-30 LAB
ATRIAL RATE: 62 BPM
P AXIS: 35 DEGREES
P OFFSET: 207 MS
P ONSET: 157 MS
PR INTERVAL: 134 MS
Q ONSET: 224 MS
QRS COUNT: 11 BEATS
QRS DURATION: 80 MS
QT INTERVAL: 428 MS
QTC CALCULATION(BAZETT): 434 MS
QTC FREDERICIA: 432 MS
R AXIS: 12 DEGREES
T AXIS: 20 DEGREES
T OFFSET: 438 MS
VENTRICULAR RATE: 62 BPM

## 2025-08-29 ENCOUNTER — TRANSCRIBE ORDERS (OUTPATIENT)
Dept: ADMINISTRATIVE | Age: 70
End: 2025-08-29

## 2025-08-29 DIAGNOSIS — Z85.3 HX OF BREAST CANCER: Primary | ICD-10-CM

## (undated) DEVICE — Device: Brand: ENDO SMARTCAP

## (undated) DEVICE — GAUZE,SPONGE,4"X4",12PLY,STERILE,LF,2'S: Brand: MEDLINE

## (undated) DEVICE — Device

## (undated) DEVICE — Z DISCONTINUED NO SUB IDED GLOVE SURG BEAD CUF 8 STD PF WHT STRL TRIUMPH LT LTX

## (undated) DEVICE — PACK,LAPAROTOMY,NO GOWNS: Brand: MEDLINE

## (undated) DEVICE — KIT CHOLGM POLYUR W/ KARLAN BLLN CATH 4FR L60CM 5MM INTRO

## (undated) DEVICE — APPLIER CLP L SHFT DIA12MM 20 ROT MULT LIGACLP

## (undated) DEVICE — INTENDED FOR TISSUE SEPARATION, AND OTHER PROCEDURES THAT REQUIRE A SHARP SURGICAL BLADE TO PUNCTURE OR CUT.: Brand: BARD-PARKER ® CARBON RIB-BACK BLADES

## (undated) DEVICE — ENDO CARRY-ON PROCEDURE KIT: Brand: ENDO CARRY-ON PROCEDURE KIT

## (undated) DEVICE — NEEDLE INSUF L120MM ULT VERES ENDOPATH

## (undated) DEVICE — ELECTRODE PT RET AD L9FT HI MOIST COND ADH HYDRGEL CORDED

## (undated) DEVICE — 2000CC GUARDIAN II: Brand: GUARDIAN

## (undated) DEVICE — PENCIL ES L3M BTTN SWCH HOLSTER W/ BLDE ELECTRD EDGE

## (undated) DEVICE — NEEDLE HYPO 22GA L1 1/2IN PIVOTING SHLD FOR LUERLOCK SYR

## (undated) DEVICE — SUTURE VCRL + SZ 3-0 L27IN ABSRB UD L26MM SH 1/2 CIR VCP416H

## (undated) DEVICE — 3M™ STERI-STRIP™ REINFORCED ADHESIVE SKIN CLOSURES, R1547, 1/2 IN X 4 IN (12 MM X 100 MM), 6 STRIPS/ENVELOPE: Brand: 3M™ STERI-STRIP™

## (undated) DEVICE — SPONGE,LAP,18"X18",DLX,XR,ST,5/PK,40/PK: Brand: MEDLINE

## (undated) DEVICE — TUBE SET 96 MM 64 MM H2O PERISTALTIC STD AUX CHANNEL

## (undated) DEVICE — X-RAY DETECTABLE SPONGES,16 PLY: Brand: VISTEC

## (undated) DEVICE — COVER DUST PERIMETER HUMPREY

## (undated) DEVICE — SYRINGE BLB 50CC IRRIG PLIABLE FNGR FLNG GRAD FLSK DISP

## (undated) DEVICE — TROCAR: Brand: KII SHIELDED BLADED ACCESS SYSTEM

## (undated) DEVICE — PACK,SET UP,DRAPE: Brand: MEDLINE

## (undated) DEVICE — COVER,MAYO STAND,STERILE: Brand: MEDLINE

## (undated) DEVICE — TROCAR: Brand: KII® SLEEVE

## (undated) DEVICE — DEFOGGER!" ANTI FOG KIT: Brand: DEROYAL

## (undated) DEVICE — SINGLE PORT MANIFOLD: Brand: NEPTUNE 2

## (undated) DEVICE — GLOVE SURG 5.5 PF POLYISOPRENE WHT STRL SENSICARE MIC

## (undated) DEVICE — TUBING, SUCTION, 1/4" X 10', STRAIGHT: Brand: MEDLINE

## (undated) DEVICE — SUTURE PROL SZ 3-0 L30IN NONABSORBABLE BLU L30MM FS-1 3/8 8675H

## (undated) DEVICE — CHLORAPREP 26ML ORANGE

## (undated) DEVICE — MEDI-VAC YANKAUER SUCTION HANDLE W/BULBOUS TIP: Brand: CARDINAL HEALTH

## (undated) DEVICE — DISCONTINUED USE 393278 SYRINGE 10 ML HYPO W/O NDL LL TP PLSTC ST

## (undated) DEVICE — SKIN MARKER,REGULAR TIP WITH RULER: Brand: DEVON

## (undated) DEVICE — MEDI-VAC NON-CONDUCTIVE SUCTION TUBING: Brand: CARDINAL HEALTH

## (undated) DEVICE — SUTURE MCRYL SZ 4-0 L27IN ABSRB UD L19MM PS-2 1/2 CIR PRIM Y426H

## (undated) DEVICE — DRAPE C ARM W41XL74IN UNIV MOB W RUBBERBAND CLP

## (undated) DEVICE — DRAPE,LAP,CHOLE,W/TROUGHS,STERILE: Brand: MEDLINE

## (undated) DEVICE — 1842 FOAM BLOCK NEEDLE COUNTER: Brand: DEVON

## (undated) DEVICE — COVER LT HNDL BLU PLAS

## (undated) DEVICE — SMARTGOWN BREATHABLE SPECIALTY GOWN: Brand: CONVERTORS

## (undated) DEVICE — BRUSH ENDO CLN L90.5IN SHTH DIA1.7MM BRIST DIA5-7MM 2-6MM

## (undated) DEVICE — TROCAR: Brand: KII FIOS FIRST ENTRY

## (undated) DEVICE — INSUFFLATION TUBING SET, WITH FILTER: Brand: CONMED

## (undated) DEVICE — GOWN,AURORA,NONREINFORCED,LARGE: Brand: MEDLINE

## (undated) DEVICE — SYRINGE MED 10ML TRNSLUC BRL PLUNG BLK MRK POLYPR CTRL

## (undated) DEVICE — LABEL MED MINI W/ MARKER

## (undated) DEVICE — TRAY PREP DRY W/ PREM GLV 2 APPL 6 SPNG 2 UNDPD 1 OVERWRAP

## (undated) DEVICE — WARMER LAPSCP BST 2 STG STRL DISP HEAT BLU

## (undated) DEVICE — SUTURE VCRL + SZ 4-0 L18IN ABSRB UD L19MM PS-2 3/8 CIR PRIM VCP496H

## (undated) DEVICE — ADAPTER FLSH PMP FLD MGMT GI IRRIG OFP 2 DISPOSABLE

## (undated) DEVICE — FLEXIBLE ADHESIVE BANDAGE,X-LARGE: Brand: CURITY

## (undated) DEVICE — DRAIN JACKSON PRATT ROUND 15FR: Brand: CARDINAL HEALTH